# Patient Record
Sex: MALE | Race: WHITE | NOT HISPANIC OR LATINO | URBAN - METROPOLITAN AREA
[De-identification: names, ages, dates, MRNs, and addresses within clinical notes are randomized per-mention and may not be internally consistent; named-entity substitution may affect disease eponyms.]

---

## 2017-08-24 ENCOUNTER — EMERGENCY (EMERGENCY)
Facility: HOSPITAL | Age: 57
LOS: 1 days | Discharge: HOME | End: 2017-08-24
Attending: EMERGENCY MEDICINE
Payer: COMMERCIAL

## 2017-08-24 VITALS
DIASTOLIC BLOOD PRESSURE: 87 MMHG | HEIGHT: 71 IN | TEMPERATURE: 99 F | OXYGEN SATURATION: 97 % | HEART RATE: 83 BPM | RESPIRATION RATE: 21 BRPM | SYSTOLIC BLOOD PRESSURE: 194 MMHG | WEIGHT: 315 LBS

## 2017-08-24 PROBLEM — Z00.00 ENCOUNTER FOR PREVENTIVE HEALTH EXAMINATION: Status: ACTIVE | Noted: 2017-08-24

## 2017-08-24 PROCEDURE — 12002 RPR S/N/AX/GEN/TRNK2.6-7.5CM: CPT

## 2017-08-24 PROCEDURE — 90471 IMMUNIZATION ADMIN: CPT

## 2017-08-24 PROCEDURE — 90715 TDAP VACCINE 7 YRS/> IM: CPT

## 2017-08-24 PROCEDURE — 99283 EMERGENCY DEPT VISIT LOW MDM: CPT | Mod: 25

## 2017-08-24 RX ORDER — HYDRALAZINE HCL 50 MG
50 TABLET ORAL ONCE
Qty: 0 | Refills: 0 | Status: COMPLETED | OUTPATIENT
Start: 2017-08-24 | End: 2017-08-24

## 2017-08-24 RX ORDER — METOPROLOL TARTRATE 50 MG
50 TABLET ORAL ONCE
Qty: 0 | Refills: 0 | Status: COMPLETED | OUTPATIENT
Start: 2017-08-24 | End: 2017-08-24

## 2017-08-24 RX ORDER — TETANUS TOXOID, REDUCED DIPHTHERIA TOXOID AND ACELLULAR PERTUSSIS VACCINE, ADSORBED 5; 2.5; 8; 8; 2.5 [IU]/.5ML; [IU]/.5ML; UG/.5ML; UG/.5ML; UG/.5ML
0.5 SUSPENSION INTRAMUSCULAR ONCE
Qty: 0 | Refills: 0 | Status: COMPLETED | OUTPATIENT
Start: 2017-08-24 | End: 2017-08-24

## 2017-08-24 RX ADMIN — Medication 50 MILLIGRAM(S): at 22:09

## 2017-08-24 RX ADMIN — TETANUS TOXOID, REDUCED DIPHTHERIA TOXOID AND ACELLULAR PERTUSSIS VACCINE, ADSORBED 0.5 MILLILITER(S): 5; 2.5; 8; 8; 2.5 SUSPENSION INTRAMUSCULAR at 22:09

## 2017-08-24 NOTE — ED STATDOCS - OBJECTIVE STATEMENT
55 y/o M PMHx HTN, DM, hyperlipidemia presents to ED c/o laceration to abdomen while cutting something around 1930. Pt is on coumadin for a PE he had a few years ago. Last tetanus unknown. On Hydralazine, Metroprolol and Amlodipine. Pt states he has not taken his night medications yet. Denies N/V/D, fever, chills, SOB, CP, difficulty breathing, HA, numbness, tingling and abd pain.

## 2017-08-24 NOTE — ED STATDOCS - PROGRESS NOTE DETAILS
PA NOTE: Pt discussed at length with attending HPI/ROS/PE confirmed. PT seen resting computably in no acute distress in chair, PA called over to pt to close laceration and DC home as per Dr Velasquez, PT will be Dc home with ABx follow up PCP, return to the ED to have suture removed, educated about when to return to the ED if needed. PT verbalizes that he understands all instructions and results. PA NOTE: Pt discussed at length with attending HPI/ROS/PE confirmed. PT seen resting computably in no acute distress in chair, PA called over to pt to close laceration and DC home as per Dr Velasquez, PT will be Dc home  follow up PCP, return to the ED to have suture removed, educated about when to return to the ED if needed. PT verbalizes that he understands all instructions and results.

## 2017-08-24 NOTE — ED ADULT NURSE NOTE - OBJECTIVE STATEMENT
pt received in [facility]. pt is awake alert oriented following commands and speaking coherently. pt presents to the er complaining of laceration to abdomen that happened while he was doing work on patio. he states that he is on coumadin from a previous PE. pt is morbidly obese.5cm laceration to mid abdomen with minimal bleeding. denies nausea vomiting. pressure was initially high, retaken and charted in flowsheets. unknown last tetanus

## 2017-08-25 VITALS
TEMPERATURE: 99 F | RESPIRATION RATE: 20 BRPM | OXYGEN SATURATION: 98 % | SYSTOLIC BLOOD PRESSURE: 156 MMHG | DIASTOLIC BLOOD PRESSURE: 78 MMHG | HEART RATE: 80 BPM

## 2018-11-17 ENCOUNTER — INPATIENT (INPATIENT)
Facility: HOSPITAL | Age: 58
LOS: 3 days | Discharge: HOME | DRG: 683 | End: 2018-11-21
Attending: HOSPITALIST | Admitting: STUDENT IN AN ORGANIZED HEALTH CARE EDUCATION/TRAINING PROGRAM
Payer: COMMERCIAL

## 2018-11-17 VITALS — WEIGHT: 315 LBS | HEIGHT: 71 IN

## 2018-11-17 PROCEDURE — 99285 EMERGENCY DEPT VISIT HI MDM: CPT | Mod: 25

## 2018-11-18 DIAGNOSIS — E66.01 MORBID (SEVERE) OBESITY DUE TO EXCESS CALORIES: ICD-10-CM

## 2018-11-18 DIAGNOSIS — I10 ESSENTIAL (PRIMARY) HYPERTENSION: ICD-10-CM

## 2018-11-18 DIAGNOSIS — N18.3 CHRONIC KIDNEY DISEASE, STAGE 3 (MODERATE): ICD-10-CM

## 2018-11-18 DIAGNOSIS — I82.432 ACUTE EMBOLISM AND THROMBOSIS OF LEFT POPLITEAL VEIN: ICD-10-CM

## 2018-11-18 DIAGNOSIS — L03.90 CELLULITIS, UNSPECIFIED: ICD-10-CM

## 2018-11-18 DIAGNOSIS — L03.116 CELLULITIS OF LEFT LOWER LIMB: ICD-10-CM

## 2018-11-18 DIAGNOSIS — E11.22 TYPE 2 DIABETES MELLITUS WITH DIABETIC CHRONIC KIDNEY DISEASE: ICD-10-CM

## 2018-11-18 DIAGNOSIS — E78.5 HYPERLIPIDEMIA, UNSPECIFIED: ICD-10-CM

## 2018-11-18 LAB
ALBUMIN SERPL ELPH-MCNC: 3.5 G/DL — SIGNIFICANT CHANGE UP (ref 3.3–5.2)
ALP SERPL-CCNC: 72 U/L — SIGNIFICANT CHANGE UP (ref 40–120)
ALT FLD-CCNC: 17 U/L — SIGNIFICANT CHANGE UP
ANION GAP SERPL CALC-SCNC: 14 MMOL/L — SIGNIFICANT CHANGE UP (ref 5–17)
APPEARANCE UR: CLEAR — SIGNIFICANT CHANGE UP
AST SERPL-CCNC: 18 U/L — SIGNIFICANT CHANGE UP
BACTERIA # UR AUTO: ABNORMAL
BASOPHILS # BLD AUTO: 0 K/UL — SIGNIFICANT CHANGE UP (ref 0–0.2)
BASOPHILS NFR BLD AUTO: 0.1 % — SIGNIFICANT CHANGE UP (ref 0–2)
BILIRUB SERPL-MCNC: 0.7 MG/DL — SIGNIFICANT CHANGE UP (ref 0.4–2)
BILIRUB UR-MCNC: NEGATIVE — SIGNIFICANT CHANGE UP
BUN SERPL-MCNC: 64 MG/DL — HIGH (ref 8–20)
CALCIUM SERPL-MCNC: 9.1 MG/DL — SIGNIFICANT CHANGE UP (ref 8.6–10.2)
CHLORIDE SERPL-SCNC: 102 MMOL/L — SIGNIFICANT CHANGE UP (ref 98–107)
CO2 SERPL-SCNC: 23 MMOL/L — SIGNIFICANT CHANGE UP (ref 22–29)
COLOR SPEC: YELLOW — SIGNIFICANT CHANGE UP
CREAT ?TM UR-MCNC: 117 MG/DL — SIGNIFICANT CHANGE UP
CREAT SERPL-MCNC: 3.3 MG/DL — HIGH (ref 0.5–1.3)
DIFF PNL FLD: ABNORMAL
EOSINOPHIL # BLD AUTO: 0.2 K/UL — SIGNIFICANT CHANGE UP (ref 0–0.5)
EOSINOPHIL NFR BLD AUTO: 1.5 % — SIGNIFICANT CHANGE UP (ref 0–5)
EPI CELLS # UR: SIGNIFICANT CHANGE UP
GLUCOSE BLDC GLUCOMTR-MCNC: 124 MG/DL — HIGH (ref 70–99)
GLUCOSE BLDC GLUCOMTR-MCNC: 126 MG/DL — HIGH (ref 70–99)
GLUCOSE BLDC GLUCOMTR-MCNC: 135 MG/DL — HIGH (ref 70–99)
GLUCOSE BLDC GLUCOMTR-MCNC: 146 MG/DL — HIGH (ref 70–99)
GLUCOSE SERPL-MCNC: 122 MG/DL — HIGH (ref 70–115)
GLUCOSE UR QL: NEGATIVE MG/DL — SIGNIFICANT CHANGE UP
HBA1C BLD-MCNC: 5.4 % — SIGNIFICANT CHANGE UP (ref 4–5.6)
HCT VFR BLD CALC: 32 % — LOW (ref 42–52)
HGB BLD-MCNC: 10.3 G/DL — LOW (ref 14–18)
IRON SATN MFR SERPL: 14 UG/DL — LOW (ref 59–158)
IRON SATN MFR SERPL: 6 % — LOW (ref 16–55)
KETONES UR-MCNC: NEGATIVE — SIGNIFICANT CHANGE UP
LEUKOCYTE ESTERASE UR-ACNC: ABNORMAL
LYMPHOCYTES # BLD AUTO: 0.9 K/UL — LOW (ref 1–4.8)
LYMPHOCYTES # BLD AUTO: 7.1 % — LOW (ref 20–55)
MCHC RBC-ENTMCNC: 25.9 PG — LOW (ref 27–31)
MCHC RBC-ENTMCNC: 32.2 G/DL — SIGNIFICANT CHANGE UP (ref 32–36)
MCV RBC AUTO: 80.6 FL — SIGNIFICANT CHANGE UP (ref 80–94)
MONOCYTES # BLD AUTO: 0.8 K/UL — SIGNIFICANT CHANGE UP (ref 0–0.8)
MONOCYTES NFR BLD AUTO: 6.7 % — SIGNIFICANT CHANGE UP (ref 3–10)
NEUTROPHILS # BLD AUTO: 10.1 K/UL — HIGH (ref 1.8–8)
NEUTROPHILS NFR BLD AUTO: 84.3 % — HIGH (ref 37–73)
NITRITE UR-MCNC: NEGATIVE — SIGNIFICANT CHANGE UP
PH UR: 5 — SIGNIFICANT CHANGE UP (ref 5–8)
PHOSPHATE SERPL-MCNC: 3.1 MG/DL — SIGNIFICANT CHANGE UP (ref 2.4–4.7)
PLATELET # BLD AUTO: 128 K/UL — LOW (ref 150–400)
POTASSIUM SERPL-MCNC: 4.2 MMOL/L — SIGNIFICANT CHANGE UP (ref 3.5–5.3)
POTASSIUM SERPL-SCNC: 4.2 MMOL/L — SIGNIFICANT CHANGE UP (ref 3.5–5.3)
PROT ?TM UR-MCNC: 62 MG/DL — HIGH (ref 0–12)
PROT SERPL-MCNC: 6.8 G/DL — SIGNIFICANT CHANGE UP (ref 6.6–8.7)
PROT UR-MCNC: 100 MG/DL
PROT/CREAT UR-RTO: 0.5 RATIO — HIGH
RBC # BLD: 3.97 M/UL — LOW (ref 4.6–6.2)
RBC # FLD: 17.8 % — HIGH (ref 11–15.6)
RBC CASTS # UR COMP ASSIST: SIGNIFICANT CHANGE UP /HPF (ref 0–4)
SODIUM SERPL-SCNC: 139 MMOL/L — SIGNIFICANT CHANGE UP (ref 135–145)
SP GR SPEC: 1.01 — SIGNIFICANT CHANGE UP (ref 1.01–1.02)
TIBC SERPL-MCNC: 229 UG/DL — SIGNIFICANT CHANGE UP (ref 220–430)
TRANSFERRIN SERPL-MCNC: 160 MG/DL — LOW (ref 180–329)
URATE SERPL-MCNC: 11.5 MG/DL — HIGH (ref 3.4–7)
UROBILINOGEN FLD QL: NEGATIVE MG/DL — SIGNIFICANT CHANGE UP
WBC # BLD: 12 K/UL — HIGH (ref 4.8–10.8)
WBC # FLD AUTO: 12 K/UL — HIGH (ref 4.8–10.8)
WBC UR QL: SIGNIFICANT CHANGE UP

## 2018-11-18 PROCEDURE — 99223 1ST HOSP IP/OBS HIGH 75: CPT

## 2018-11-18 PROCEDURE — 93971 EXTREMITY STUDY: CPT | Mod: 26,LT

## 2018-11-18 PROCEDURE — 76775 US EXAM ABDO BACK WALL LIM: CPT | Mod: 26

## 2018-11-18 PROCEDURE — 73590 X-RAY EXAM OF LOWER LEG: CPT | Mod: 26,LT

## 2018-11-18 PROCEDURE — 12345: CPT | Mod: NC

## 2018-11-18 RX ORDER — GLUCAGON INJECTION, SOLUTION 0.5 MG/.1ML
1 INJECTION, SOLUTION SUBCUTANEOUS ONCE
Qty: 0 | Refills: 0 | Status: DISCONTINUED | OUTPATIENT
Start: 2018-11-18 | End: 2018-11-21

## 2018-11-18 RX ORDER — SACCHAROMYCES BOULARDII 250 MG
250 POWDER IN PACKET (EA) ORAL
Qty: 0 | Refills: 0 | Status: DISCONTINUED | OUTPATIENT
Start: 2018-11-18 | End: 2018-11-21

## 2018-11-18 RX ORDER — VANCOMYCIN HCL 1 G
1000 VIAL (EA) INTRAVENOUS ONCE
Qty: 0 | Refills: 0 | Status: COMPLETED | OUTPATIENT
Start: 2018-11-18 | End: 2018-11-18

## 2018-11-18 RX ORDER — HYDRALAZINE HCL 50 MG
50 TABLET ORAL THREE TIMES A DAY
Qty: 0 | Refills: 0 | Status: DISCONTINUED | OUTPATIENT
Start: 2018-11-18 | End: 2018-11-21

## 2018-11-18 RX ORDER — SODIUM CHLORIDE 9 MG/ML
1000 INJECTION, SOLUTION INTRAVENOUS
Qty: 0 | Refills: 0 | Status: DISCONTINUED | OUTPATIENT
Start: 2018-11-18 | End: 2018-11-21

## 2018-11-18 RX ORDER — VANCOMYCIN HCL 1 G
1000 VIAL (EA) INTRAVENOUS EVERY 24 HOURS
Qty: 0 | Refills: 0 | Status: DISCONTINUED | OUTPATIENT
Start: 2018-11-18 | End: 2018-11-19

## 2018-11-18 RX ORDER — LABETALOL HCL 100 MG
300 TABLET ORAL
Qty: 0 | Refills: 0 | Status: DISCONTINUED | OUTPATIENT
Start: 2018-11-18 | End: 2018-11-21

## 2018-11-18 RX ORDER — ONDANSETRON 8 MG/1
4 TABLET, FILM COATED ORAL EVERY 6 HOURS
Qty: 0 | Refills: 0 | Status: DISCONTINUED | OUTPATIENT
Start: 2018-11-18 | End: 2018-11-21

## 2018-11-18 RX ORDER — SODIUM CHLORIDE 9 MG/ML
1000 INJECTION INTRAMUSCULAR; INTRAVENOUS; SUBCUTANEOUS ONCE
Qty: 0 | Refills: 0 | Status: DISCONTINUED | OUTPATIENT
Start: 2018-11-18 | End: 2018-11-18

## 2018-11-18 RX ORDER — DEXTROSE 50 % IN WATER 50 %
15 SYRINGE (ML) INTRAVENOUS ONCE
Qty: 0 | Refills: 0 | Status: DISCONTINUED | OUTPATIENT
Start: 2018-11-18 | End: 2018-11-21

## 2018-11-18 RX ORDER — DEXTROSE 50 % IN WATER 50 %
12.5 SYRINGE (ML) INTRAVENOUS ONCE
Qty: 0 | Refills: 0 | Status: DISCONTINUED | OUTPATIENT
Start: 2018-11-18 | End: 2018-11-21

## 2018-11-18 RX ORDER — INSULIN LISPRO 100/ML
VIAL (ML) SUBCUTANEOUS
Qty: 0 | Refills: 0 | Status: DISCONTINUED | OUTPATIENT
Start: 2018-11-18 | End: 2018-11-21

## 2018-11-18 RX ORDER — TAMSULOSIN HYDROCHLORIDE 0.4 MG/1
0.4 CAPSULE ORAL AT BEDTIME
Qty: 0 | Refills: 0 | Status: DISCONTINUED | OUTPATIENT
Start: 2018-11-18 | End: 2018-11-21

## 2018-11-18 RX ORDER — ATORVASTATIN CALCIUM 80 MG/1
20 TABLET, FILM COATED ORAL AT BEDTIME
Qty: 0 | Refills: 0 | Status: DISCONTINUED | OUTPATIENT
Start: 2018-11-18 | End: 2018-11-21

## 2018-11-18 RX ORDER — RIVAROXABAN 15 MG-20MG
15 KIT ORAL EVERY 24 HOURS
Qty: 0 | Refills: 0 | Status: DISCONTINUED | OUTPATIENT
Start: 2018-11-18 | End: 2018-11-21

## 2018-11-18 RX ORDER — ACETAMINOPHEN 500 MG
650 TABLET ORAL EVERY 6 HOURS
Qty: 0 | Refills: 0 | Status: DISCONTINUED | OUTPATIENT
Start: 2018-11-18 | End: 2018-11-21

## 2018-11-18 RX ORDER — SODIUM CHLORIDE 9 MG/ML
1000 INJECTION INTRAMUSCULAR; INTRAVENOUS; SUBCUTANEOUS
Qty: 0 | Refills: 0 | Status: DISCONTINUED | OUTPATIENT
Start: 2018-11-18 | End: 2018-11-21

## 2018-11-18 RX ORDER — SODIUM CHLORIDE 9 MG/ML
3 INJECTION INTRAMUSCULAR; INTRAVENOUS; SUBCUTANEOUS EVERY 8 HOURS
Qty: 0 | Refills: 0 | Status: DISCONTINUED | OUTPATIENT
Start: 2018-11-18 | End: 2018-11-21

## 2018-11-18 RX ORDER — AMLODIPINE BESYLATE 2.5 MG/1
10 TABLET ORAL DAILY
Qty: 0 | Refills: 0 | Status: DISCONTINUED | OUTPATIENT
Start: 2018-11-18 | End: 2018-11-21

## 2018-11-18 RX ADMIN — Medication 50 MILLIGRAM(S): at 13:49

## 2018-11-18 RX ADMIN — ATORVASTATIN CALCIUM 20 MILLIGRAM(S): 80 TABLET, FILM COATED ORAL at 21:53

## 2018-11-18 RX ADMIN — Medication 300 MILLIGRAM(S): at 07:32

## 2018-11-18 RX ADMIN — SODIUM CHLORIDE 3 MILLILITER(S): 9 INJECTION INTRAMUSCULAR; INTRAVENOUS; SUBCUTANEOUS at 07:38

## 2018-11-18 RX ADMIN — TAMSULOSIN HYDROCHLORIDE 0.4 MILLIGRAM(S): 0.4 CAPSULE ORAL at 21:52

## 2018-11-18 RX ADMIN — Medication 300 MILLIGRAM(S): at 17:29

## 2018-11-18 RX ADMIN — Medication 250 MILLIGRAM(S): at 04:25

## 2018-11-18 RX ADMIN — SODIUM CHLORIDE 3 MILLILITER(S): 9 INJECTION INTRAMUSCULAR; INTRAVENOUS; SUBCUTANEOUS at 13:47

## 2018-11-18 RX ADMIN — SODIUM CHLORIDE 3 MILLILITER(S): 9 INJECTION INTRAMUSCULAR; INTRAVENOUS; SUBCUTANEOUS at 21:53

## 2018-11-18 RX ADMIN — RIVAROXABAN 15 MILLIGRAM(S): KIT at 17:29

## 2018-11-18 RX ADMIN — AMLODIPINE BESYLATE 10 MILLIGRAM(S): 2.5 TABLET ORAL at 07:32

## 2018-11-18 RX ADMIN — Medication 250 MILLIGRAM(S): at 07:32

## 2018-11-18 RX ADMIN — SODIUM CHLORIDE 100 MILLILITER(S): 9 INJECTION INTRAMUSCULAR; INTRAVENOUS; SUBCUTANEOUS at 13:47

## 2018-11-18 RX ADMIN — Medication 50 MILLIGRAM(S): at 21:53

## 2018-11-18 RX ADMIN — Medication 250 MILLIGRAM(S): at 17:29

## 2018-11-18 RX ADMIN — Medication 50 MILLIGRAM(S): at 07:32

## 2018-11-18 RX ADMIN — Medication 1 TABLET(S): at 13:48

## 2018-11-18 NOTE — ED PROVIDER NOTE - CARE PLAN
Principal Discharge DX:	Cellulitis Principal Discharge DX:	Cellulitis  Secondary Diagnosis:	Acute deep vein thrombosis (DVT) of popliteal vein of left lower extremity  Secondary Diagnosis:	GODWIN (acute kidney injury)

## 2018-11-18 NOTE — ED ADULT NURSE REASSESSMENT NOTE - NS ED NURSE REASSESS COMMENT FT1
Pt A&Ox4 c/o LLE pain, swelling and redness at this time. Pt resting comfortably, VSS, no signs of distress at this time, awaiting bed, report given to HR RN VL, moved to CDU 3, safety maintained, call bell in reach. Pt A&Ox4 c/o LLE pain, swelling and redness at this time. Pt resting comfortably, VSS, no signs of distress at this time, awaiting bed, safety maintained, call bell in reach.

## 2018-11-18 NOTE — ED ADULT NURSE NOTE - CHPI ED NUR SYMPTOMS NEG
no vomiting/no weakness/no chills/no decreased eating/drinking/no dizziness/no fever/no nausea/no tingling

## 2018-11-18 NOTE — ED ADULT NURSE REASSESSMENT NOTE - COMFORT CARE
side rails up/darkened lights/plan of care explained/repositioned/wait time explained/warm blanket provided

## 2018-11-18 NOTE — ED PROVIDER NOTE - OBJECTIVE STATEMENT
57 yo M Pt, PmHx: HTN, HLD, obesity, DM, DVT, PE, PVD on Xarelto 20 mg, presents to ED complaining of LLE swelling x 2 days. PT states he has LLE swelling, redness, and tenderness. Pt admits to associated chills. Pt denies fever, chills, N/V/D, recent trauma, SOB, chest pain, and any other acute symptoms at this time.   PMD: 59 yo M Pt, PmHx: HTN, HLD, obesity, DM, DVT, PE, PVD on Xarelto 20 mg, presents to ED complaining of LLE swelling x 2 days. PT states he has LLE swelling, redness, and tenderness. Pt admits to associated chills. Pt denies fever, chills, N/V/D, recent trauma, SOB, chest pain, and any other acute symptoms at this time.   PMD: Ernie

## 2018-11-18 NOTE — ED ADULT NURSE REASSESSMENT NOTE - NSIMPLEMENTINTERV_GEN_ALL_ED
Implemented All Fall Risk Interventions:  [place] to call system. Call bell, personal items and telephone within reach. Instruct patient to call for assistance. Room bathroom lighting operational. Non-slip footwear when patient is off stretcher. Physically safe environment: no spills, clutter or unnecessary equipment. Stretcher in lowest position, wheels locked, appropriate side rails in place. Provide visual cue, wrist band, yellow gown, etc. Monitor gait and stability. Monitor for mental status changes and reorient to person, place, and time. Review medications for side effects contributing to fall risk. Reinforce activity limits and safety measures with patient and family.

## 2018-11-18 NOTE — ED PROVIDER NOTE - SECONDARY DIAGNOSIS.
Acute deep vein thrombosis (DVT) of popliteal vein of left lower extremity GODWIN (acute kidney injury)

## 2018-11-18 NOTE — CHART NOTE - NSCHARTNOTEFT_GEN_A_CORE
FRENCH DEY    [id number]    58y      Male    INTERVAL HPI/OVERNIGHT EVENTS:    REVIEW OF SYSTEMS:    CONSTITUTIONAL: No fever, weight loss, or fatigue  RESPIRATORY: No cough, wheezing, hemoptysis; No shortness of breath  CARDIOVASCULAR: No chest pain, palpitations  GASTROINTESTINAL: No abdominal or epigastric pain. No nausea, vomiting  NEUROLOGICAL: No headaches, memory loss, loss of strength.  MISCELLANEOUS:      Vital Signs Last 24 Hrs  T(C): 37.1 (18 Nov 2018 08:04), Max: 37.2 (17 Nov 2018 23:44)  T(F): 98.8 (18 Nov 2018 08:04), Max: 98.9 (17 Nov 2018 23:44)  HR: 75 (18 Nov 2018 08:04) (66 - 76)  BP: 133/66 (18 Nov 2018 08:04) (133/66 - 171/75)  BP(mean): 116 (18 Nov 2018 04:15) (116 - 116)  RR: 18 (18 Nov 2018 08:04) (16 - 20)  SpO2: 98% (18 Nov 2018 05:59) (97% - 98%)    PHYSICAL EXAM:    GENERAL: NAD, well-groomed  HEENT: PERRL, +EOMI  NECK: soft, Supple, No JVD,   CHEST/LUNG: Clear to auscultation bilaterally; No wheezing  HEART: S1S2+, Regular rate and rhythm; No murmurs, rubs, or gallops  ABDOMEN: Soft, Nontender, Nondistended; Bowel sounds present  EXTREMITIES:  2+ Peripheral Pulses, No clubbing, cyanosis, or edema  SKIN: No rashes or lesions  NEURO: AAOX3, no focal deficits, no motor r sensory loss  PSYCH: normal mood      LABS:                        10.3   12.0  )-----------( 128      ( 18 Nov 2018 02:13 )             32.0     11-18    139  |  102  |  64.0<H>  ----------------------------<  122<H>  4.2   |  23.0  |  3.30<H>    Ca    9.1      18 Nov 2018 02:13    TPro  6.8  /  Alb  3.5  /  TBili  0.7  /  DBili  x   /  AST  18  /  ALT  17  /  AlkPhos  72  11-18            MEDICATIONS  (STANDING):  amLODIPine   Tablet 10 milliGRAM(s) Oral daily  atorvastatin 20 milliGRAM(s) Oral at bedtime  dextrose 5%. 1000 milliLiter(s) (50 mL/Hr) IV Continuous <Continuous>  dextrose 50% Injectable 12.5 Gram(s) IV Push once  hydrALAZINE 50 milliGRAM(s) Oral three times a day  insulin lispro (HumaLOG) corrective regimen sliding scale   SubCutaneous Before meals and at bedtime  labetalol 300 milliGRAM(s) Oral two times a day  levoFLOXacin IVPB 500 milliGRAM(s) IV Intermittent every 48 hours  multivitamin 1 Tablet(s) Oral daily  rivaroxaban 15 milliGRAM(s) Oral every 24 hours  saccharomyces boulardii 250 milliGRAM(s) Oral two times a day  sodium chloride 0.9% lock flush 3 milliLiter(s) IV Push every 8 hours  sodium chloride 0.9%. 1000 milliLiter(s) (100 mL/Hr) IV Continuous <Continuous>  tamsulosin 0.4 milliGRAM(s) Oral at bedtime  vancomycin  IVPB 1000 milliGRAM(s) IV Intermittent every 24 hours    MEDICATIONS  (PRN):  acetaminophen   Tablet .. 650 milliGRAM(s) Oral every 6 hours PRN Temp greater or equal to 38C (100.4F), Mild Pain (1 - 3)  dextrose 40% Gel 15 Gram(s) Oral once PRN Blood Glucose LESS THAN 70 milliGRAM(s)/deciliter  glucagon  Injectable 1 milliGRAM(s) IntraMuscular once PRN Glucose LESS THAN 70 milligrams/deciliter  ondansetron Injectable 4 milliGRAM(s) IV Push every 6 hours PRN Nausea      RADIOLOGY & ADDITIONAL TESTS: FRENCH DEY    [id number]    58y      Male    INTERVAL HPI/OVERNIGHT EVENTS:    patient being seen for cellulitis, dvt and acute on chronic renal failure. Patient seen at bedside and states pain and swelling in leg is improved.     of note patient states he was recently changed from coumadin to xarelto.       REVIEW OF SYSTEMS:    CONSTITUTIONAL: No fever, weight loss, or fatigue  RESPIRATORY: No cough, wheezing, hemoptysis; No shortness of breath  CARDIOVASCULAR: No chest pain, palpitations  GASTROINTESTINAL: No abdominal or epigastric pain. No nausea, vomiting  NEUROLOGICAL: No headaches, memory loss, loss of strength.  MISCELLANEOUS:      Vital Signs Last 24 Hrs  T(C): 37.1 (18 Nov 2018 08:04), Max: 37.2 (17 Nov 2018 23:44)  T(F): 98.8 (18 Nov 2018 08:04), Max: 98.9 (17 Nov 2018 23:44)  HR: 75 (18 Nov 2018 08:04) (66 - 76)  BP: 133/66 (18 Nov 2018 08:04) (133/66 - 171/75)  BP(mean): 116 (18 Nov 2018 04:15) (116 - 116)  RR: 18 (18 Nov 2018 08:04) (16 - 20)  SpO2: 98% (18 Nov 2018 05:59) (97% - 98%)    PHYSICAL EXAM:    GENERAL: NAD, obese   HEENT: PERRL, +EOMI  NECK: soft, Supple, No JVD,   CHEST/LUNG: Clear to auscultation bilaterally; No wheezing  HEART: S1S2+, Regular rate and rhythm; No murmurs  ABDOMEN: Soft, Nontender,   EXTREMITIES:  LLE swelling with chronic skin changes, left> right   NEURO: AAOX3,  PSYCH: normal mood      LABS:                        10.3   12.0  )-----------( 128      ( 18 Nov 2018 02:13 )             32.0     11-18    139  |  102  |  64.0<H>  ----------------------------<  122<H>  4.2   |  23.0  |  3.30<H>    Ca    9.1      18 Nov 2018 02:13    TPro  6.8  /  Alb  3.5  /  TBili  0.7  /  DBili  x   /  AST  18  /  ALT  17  /  AlkPhos  72  11-18        MEDICATIONS  (STANDING):  amLODIPine   Tablet 10 milliGRAM(s) Oral daily  atorvastatin 20 milliGRAM(s) Oral at bedtime  dextrose 5%. 1000 milliLiter(s) (50 mL/Hr) IV Continuous <Continuous>  dextrose 50% Injectable 12.5 Gram(s) IV Push once  hydrALAZINE 50 milliGRAM(s) Oral three times a day  insulin lispro (HumaLOG) corrective regimen sliding scale   SubCutaneous Before meals and at bedtime  labetalol 300 milliGRAM(s) Oral two times a day  levoFLOXacin IVPB 500 milliGRAM(s) IV Intermittent every 48 hours  multivitamin 1 Tablet(s) Oral daily  rivaroxaban 15 milliGRAM(s) Oral every 24 hours  saccharomyces boulardii 250 milliGRAM(s) Oral two times a day  sodium chloride 0.9% lock flush 3 milliLiter(s) IV Push every 8 hours  sodium chloride 0.9%. 1000 milliLiter(s) (100 mL/Hr) IV Continuous <Continuous>  tamsulosin 0.4 milliGRAM(s) Oral at bedtime  vancomycin  IVPB 1000 milliGRAM(s) IV Intermittent every 24 hours    MEDICATIONS  (PRN):  acetaminophen   Tablet .. 650 milliGRAM(s) Oral every 6 hours PRN Temp greater or equal to 38C (100.4F), Mild Pain (1 - 3)  dextrose 40% Gel 15 Gram(s) Oral once PRN Blood Glucose LESS THAN 70 milliGRAM(s)/deciliter  glucagon  Injectable 1 milliGRAM(s) IntraMuscular once PRN Glucose LESS THAN 70 milligrams/deciliter  ondansetron Injectable 4 milliGRAM(s) IV Push every 6 hours PRN Nausea      RADIOLOGY & ADDITIONAL TESTS:    us renal - IMPRESSION:     Extremely limited examination due to patient body habitus.    Right kidney grossly unremarkable.    Left kidney not visualized.      a/p    57 y/o male with ckd admitted for LLE cellulitis and acute on chronic renal failure      Problem/Plan - 1:  ·  Problem: Cellulitis of left lower extremity.  Plan: Likely secondary to poor skin integrity with underlying venous stasis dermatitis and DM-2. No sepsis criteria. Cont. renal dose Levaquin, Vancomycin, monitor temp curve, WBC. Add probiotics, Elevation, counseled on stocking use in future.      Problem/Plan - 2:  ·  Problem: R/O GODWIN (acute kidney injury).  Plan: Meets criteria for GODWIN based on known Cr. of 2.4 in 10/18. Concern for obstructive uropathy with frequent urination possible from overflow incontinence. Likely has underlying hypertensive/DM nephropathy. Has history of nephrotic range proteinuria too as per patient. Will hold ACE/ARB, Metformin, renally dose meds,   --> renal us as above  --> nephro consult appreciated     Problem/Plan - 3:  ·  Problem: Acute deep vein thrombosis (DVT) of popliteal vein of left lower extremity.  known hx of DVT.   -->  Xarelto, will adjust for renal dosing based on low GFR. Denies any bleeding, falls.      Problem/Plan - 4:  ·  Problem: CKD (chronic kidney disease) stage 3, GFR 30-59 ml/min.  renal consult appreciated  --> bmp in am      Problem/Plan - 5:  ·  Problem: Type 2 diabetes mellitus with stage 4 chronic kidney disease, without long-term current use of insulin.  Plan: Hold metformin, start on HISS, Accu-Cheks AC/HS, ADA diet.      Problem/Plan - 6:  Problem: Hypertension, unspecified type. Plan: DASH diet, cont. o/p regimen except for ARB with GODWIN.     Problem/Plan - 7:  ·  Problem: Hyperlipidemia, unspecified hyperlipidemia type.  Plan: statin.      Problem/Plan - 8:  ·  Problem: Class 3 severe obesity with serious comorbidity in adult, unspecified BMI, unspecified obesity type.  Plan: counseled on diet/exercise.     Problem 9 - urinary spasms --> check ua and urine culture

## 2018-11-18 NOTE — CONSULT NOTE ADULT - ASSESSMENT
GODWIN on CKD cr baseline per pt approx 2.4 I will obtain office records crescencio  Progression of underlying dz HTN/ DM vs some degree of GODWIN   Will give trial of IVF 24hrs   Suspect will ultimately need diuretics  Will check renal sono r/o obstructive etiology  Avoid NSAID nephrotoxic agents   Will check 24 hr urine study  cellulitis L LE  Renally dose abx  HTN BP controlled on current regime  meds reviewed    Will follow

## 2018-11-18 NOTE — ED PROVIDER NOTE - MEDICAL DECISION MAKING DETAILS
Lower extremity swelling and erythema, likely cellulitis, will obtain labs, initiate IV abx, iv fluids, US and consider admission of extensive cellulitis

## 2018-11-18 NOTE — ED PROVIDER NOTE - MUSCULOSKELETAL, MLM
Spine appears normal, range of motion is not limited, + TTP of LLE calf and knee, DP intact, FROM of LLE, no ligamentous laxity

## 2018-11-18 NOTE — H&P ADULT - PROBLEM SELECTOR PLAN 1
Likely secondary to poor skin integrity with underlying venous stasis dermatitis and DM-2. No sepsis criteria. Cont. renal dose Levaquin, Vancomycin, monitor temp curve, WBC. Add probiotics, Elevation, counseled on stocking use in future

## 2018-11-18 NOTE — H&P ADULT - PROBLEM SELECTOR PLAN 2
Meets criteria for GODWIN based on known Cr. of 2.4 in 10/18. Concern for obstructive uropathy with frequent urination possible from overflow incontinence. Likely has underlying hypertensive/DM nephropathy. Has history of nephrotic range proteinuria too as per patient. Will hold ACE/ARB, Metformin, renally dose meds, check renal U/S. Nephro eval called.

## 2018-11-18 NOTE — ED PROVIDER NOTE - ATTENDING CONTRIBUTION TO CARE
58-year-old maleMorbid obesityHistory of DVTDiabetesPresents to emergency room withComplaints ofGradual increasing erythema swelling of left lower extremity patient is on 0 Ernestine DVT plan to check labs ultrasound IV antibiotics and reevaluate

## 2018-11-18 NOTE — H&P ADULT - PROBLEM SELECTOR PLAN 3
US as above, known hx of DVT. No Resp. symptoms, not hypotensive of hypoxic to suggest PE. On Xarelto, will adjust for renal dosing based on low GFR. Denies any bleeding, falls

## 2018-11-18 NOTE — ED ADULT NURSE NOTE - NSIMPLEMENTINTERV_GEN_ALL_ED
Implemented All Fall Risk Interventions:  [place] to call system. Call bell, personal items and telephone within reach. Instruct patient to call for assistance. Room bathroom lighting operational. Non-slip footwear when patient is off stretcher. Physically safe environment: no spills, clutter or unnecessary equipment. Stretcher in lowest position, wheels locked, appropriate side rails in place. Provide visual cue, wrist band, yellow gown, etc. Monitor gait and stability. Monitor for mental status changes and reorient to person, place, and time. Review medications for side effects contributing to fall risk. Reinforce activity limits and safety measures with patient and family.

## 2018-11-18 NOTE — H&P ADULT - ASSESSMENT
59 y/o male with LLE cellulitis, Chronic DVT, GODWIN on CKD-3, HTN, HLD, DM-2, Morbid Obesity, NOBLE on CPAP

## 2018-11-18 NOTE — ED PROVIDER NOTE - SKIN, MLM
Skin normal color for race, warm, dry and intact. + rash consistent with PVD b.l LE, LLE:  distal leg circumferential edema and erythema, extending superior to knee, upper thigh and pannus. + abrasion on LLE 3rd toe

## 2018-11-18 NOTE — H&P ADULT - HISTORY OF PRESENT ILLNESS
57 y/o male with history of morbid obesity, LE DVT in s/p IVC filter on Xarelto, CKD-3 with baseline Cr. of 2.4 as of 10/18, HTN, DM-2 last A1c 6.8, HLD, NOBLE on CPAP presents with 3 days of increasing LLE pain, swelling, and redness. He feels he scratched a few days ago and has not been wearing his stockings causing it to get infected. He denies any other trauma. He admits to subjective fevers, and chills. He was going to see his PMD today, however he decided to come to the ED for further eval. In the ED he was noted to have LLE cellulitis on top of venous stasis dermatitis as well as GODWIN on CKD. He denies CP, SOB, N/V. He does admit to frequent urination, but no dysuria or flank pain.

## 2018-11-18 NOTE — CONSULT NOTE ADULT - SUBJECTIVE AND OBJECTIVE BOX
HPI:  57 y/o male with history of morbid obesity, LE DVT in s/p IVC filter on Xarelto, CKD-3 with baseline Cr. of 2.4 as of 10/18, HTN, DM-2 last A1c 6.8, HLD, NOBLE on CPAP presents with 3 days of increasing LLE pain, swelling, and redness. He feels he scratched a few days ago and has not been wearing his stockings causing it to get infected. He denies any other trauma. He admits to subjective fevers, and chills. He was going to see his PMD today, however he decided to come to the ED for further eval. In the ED he was noted to have LLE cellulitis on top of venous stasis dermatitis as well as GODWIN on CKD. He tells me cr 2.4 at baseline he follows w Dr Reyez in our office I will obtain records crescencio when office is open. He denies CP, SOB, N/V. He does admit to frequent urination, but no dysuria or flank pain.     PAST MEDICAL & SURGICAL HISTORY:  CKD (chronic kidney disease) stage 3, GFR 30-59 ml/min  Obesity  PVD (peripheral vascular disease)  DVT (deep venous thrombosis)  Pulmonary embolism  DM (diabetes mellitus)  HLD (hyperlipidemia)  HTN (hypertension)  No significant past surgical history      FAMILY HISTORY:  Family history of lung cancer (Mother)  [place]    Social History:Non smoker    MEDICATIONS  (STANDING):  amLODIPine   Tablet 10 milliGRAM(s) Oral daily  atorvastatin 20 milliGRAM(s) Oral at bedtime  dextrose 5%. 1000 milliLiter(s) (50 mL/Hr) IV Continuous <Continuous>  dextrose 50% Injectable 12.5 Gram(s) IV Push once  hydrALAZINE 50 milliGRAM(s) Oral three times a day  insulin lispro (HumaLOG) corrective regimen sliding scale   SubCutaneous Before meals and at bedtime  labetalol 300 milliGRAM(s) Oral two times a day  levoFLOXacin IVPB 500 milliGRAM(s) IV Intermittent every 48 hours  multivitamin 1 Tablet(s) Oral daily  rivaroxaban 15 milliGRAM(s) Oral every 24 hours  saccharomyces boulardii 250 milliGRAM(s) Oral two times a day  sodium chloride 0.9% lock flush 3 milliLiter(s) IV Push every 8 hours  vancomycin  IVPB 1000 milliGRAM(s) IV Intermittent every 24 hours    MEDICATIONS  (PRN):  acetaminophen   Tablet .. 650 milliGRAM(s) Oral every 6 hours PRN Temp greater or equal to 38C (100.4F), Mild Pain (1 - 3)  dextrose 40% Gel 15 Gram(s) Oral once PRN Blood Glucose LESS THAN 70 milliGRAM(s)/deciliter  glucagon  Injectable 1 milliGRAM(s) IntraMuscular once PRN Glucose LESS THAN 70 milligrams/deciliter  ondansetron Injectable 4 milliGRAM(s) IV Push every 6 hours PRN Nausea   Meds reviewed    Allergies    penicillin (Fever)        Vital Signs Last 24 Hrs  T(C): 37.1 (18 Nov 2018 08:04), Max: 37.2 (17 Nov 2018 23:44)  T(F): 98.8 (18 Nov 2018 08:04), Max: 98.9 (17 Nov 2018 23:44)  HR: 75 (18 Nov 2018 08:04) (66 - 76)  BP: 133/66 (18 Nov 2018 08:04) (133/66 - 171/75)  BP(mean): 116 (18 Nov 2018 04:15) (116 - 116)  RR: 18 (18 Nov 2018 08:04) (16 - 20)  SpO2: 98% (18 Nov 2018 05:59) (97% - 98%)  Daily Height in cm: 180.34 (17 Nov 2018 23:43)    Daily     PHYSICAL EXAM:    GENERAL: appears chronically ill, morbid obesity  HEAD:  NCAT  EYES: EOMI  NECK: Supple, neck  veins full  NERVOUS SYSTEM:  Alert & Oriented X3  CHEST/LUNG: Clear to percussion bilaterally  HEART: Regular rate and rhythm; No murmurs  ABDOMEN: Soft, Nontender, Nondistended; Bowel sounds present  EXTREMITIES:  2+ edema B/L LE erythematous L>>R      LABS:                        10.3   12.0  )-----------( 128      ( 18 Nov 2018 02:13 )             32.0     11-18    139  |  102  |  64.0<H>  ----------------------------<  122<H>  4.2   |  23.0  |  3.30<H>    Ca    9.1      18 Nov 2018 02:13    TPro  6.8  /  Alb  3.5  /  TBili  0.7  /  DBili  x   /  AST  18  /  ALT  17  /  AlkPhos  72  11-18                RADIOLOGY & ADDITIONAL TESTS:

## 2018-11-18 NOTE — ED PROVIDER NOTE - PMH
DM (diabetes mellitus)    DVT (deep venous thrombosis)    HLD (hyperlipidemia)    HTN (hypertension)    Obesity    Pulmonary embolism    PVD (peripheral vascular disease)

## 2018-11-18 NOTE — ED ADULT NURSE NOTE - OBJECTIVE STATEMENT
Pt A&Ox4 c/o LLE pain redness and swelling at this time. Pt resting comfortably, VSS, no signs of distress at this time, safety maintained, call bell in reach.

## 2018-11-18 NOTE — H&P ADULT - EXTREMITIES COMMENTS
B/L LE venous stasis dermatitis, Left leg markedly larger than right, diffuse erythema and warmth of calf extending up to posterior thigh, no fluctuance, no crepitus. Pulses +1 B/L

## 2018-11-18 NOTE — PATIENT PROFILE ADULT - NSASFUNCLEVELADLTOILET_GEN_A_NUR
Pt was a NO SHOW for 06/12/18 appt. Called pt but mailbox was full. Unable to leave VM. 0 = independent

## 2018-11-18 NOTE — H&P ADULT - PMH
CKD (chronic kidney disease) stage 3, GFR 30-59 ml/min    DM (diabetes mellitus)    DVT (deep venous thrombosis)    HLD (hyperlipidemia)    HTN (hypertension)    Obesity    Pulmonary embolism    PVD (peripheral vascular disease)

## 2018-11-19 LAB
ANION GAP SERPL CALC-SCNC: 12 MMOL/L — SIGNIFICANT CHANGE UP (ref 5–17)
BASOPHILS # BLD AUTO: 0 K/UL — SIGNIFICANT CHANGE UP (ref 0–0.2)
BASOPHILS NFR BLD AUTO: 0.1 % — SIGNIFICANT CHANGE UP (ref 0–2)
BUN SERPL-MCNC: 63 MG/DL — HIGH (ref 8–20)
CALCIUM SERPL-MCNC: 9.1 MG/DL — SIGNIFICANT CHANGE UP (ref 8.6–10.2)
CHLORIDE SERPL-SCNC: 104 MMOL/L — SIGNIFICANT CHANGE UP (ref 98–107)
CO2 SERPL-SCNC: 24 MMOL/L — SIGNIFICANT CHANGE UP (ref 22–29)
COLLECT DURATION TIME UR: 24 HR — SIGNIFICANT CHANGE UP
COLLECT DURATION TIME UR: 24 HR — SIGNIFICANT CHANGE UP
CREAT SERPL-MCNC: 3.02 MG/DL — HIGH (ref 0.5–1.3)
CULTURE RESULTS: NO GROWTH — SIGNIFICANT CHANGE UP
EOSINOPHIL # BLD AUTO: 0.2 K/UL — SIGNIFICANT CHANGE UP (ref 0–0.5)
EOSINOPHIL NFR BLD AUTO: 1.8 % — SIGNIFICANT CHANGE UP (ref 0–5)
GLUCOSE BLDC GLUCOMTR-MCNC: 116 MG/DL — HIGH (ref 70–99)
GLUCOSE BLDC GLUCOMTR-MCNC: 125 MG/DL — HIGH (ref 70–99)
GLUCOSE BLDC GLUCOMTR-MCNC: 146 MG/DL — HIGH (ref 70–99)
GLUCOSE BLDC GLUCOMTR-MCNC: 151 MG/DL — HIGH (ref 70–99)
GLUCOSE SERPL-MCNC: 147 MG/DL — HIGH (ref 70–115)
HBA1C BLD-MCNC: 5.6 % — SIGNIFICANT CHANGE UP (ref 4–5.6)
HCT VFR BLD CALC: 30.7 % — LOW (ref 42–52)
HGB BLD-MCNC: 9.7 G/DL — LOW (ref 14–18)
LYMPHOCYTES # BLD AUTO: 0.9 K/UL — LOW (ref 1–4.8)
LYMPHOCYTES # BLD AUTO: 7.9 % — LOW (ref 20–55)
MAGNESIUM SERPL-MCNC: 2.2 MG/DL — SIGNIFICANT CHANGE UP (ref 1.6–2.6)
MCHC RBC-ENTMCNC: 25.6 PG — LOW (ref 27–31)
MCHC RBC-ENTMCNC: 31.6 G/DL — LOW (ref 32–36)
MCV RBC AUTO: 81 FL — SIGNIFICANT CHANGE UP (ref 80–94)
MONOCYTES # BLD AUTO: 1 K/UL — HIGH (ref 0–0.8)
MONOCYTES NFR BLD AUTO: 8.9 % — SIGNIFICANT CHANGE UP (ref 3–10)
NEUTROPHILS # BLD AUTO: 8.9 K/UL — HIGH (ref 1.8–8)
NEUTROPHILS NFR BLD AUTO: 80.5 % — HIGH (ref 37–73)
PHOSPHATE SERPL-MCNC: 3.9 MG/DL — SIGNIFICANT CHANGE UP (ref 2.4–4.7)
PLATELET # BLD AUTO: 121 K/UL — LOW (ref 150–400)
POTASSIUM SERPL-MCNC: 4.1 MMOL/L — SIGNIFICANT CHANGE UP (ref 3.5–5.3)
POTASSIUM SERPL-SCNC: 4.1 MMOL/L — SIGNIFICANT CHANGE UP (ref 3.5–5.3)
PROT 24H UR-MRATE: 1200 MG/24HR — HIGH (ref 50–100)
RBC # BLD: 3.79 M/UL — LOW (ref 4.6–6.2)
RBC # FLD: 17.7 % — HIGH (ref 11–15.6)
SODIUM SERPL-SCNC: 140 MMOL/L — SIGNIFICANT CHANGE UP (ref 135–145)
SPECIMEN SOURCE: SIGNIFICANT CHANGE UP
TOTAL VOLUME - 24 HOUR: 2000 ML — SIGNIFICANT CHANGE UP
TOTAL VOLUME - 24 HOUR: 2000 ML — SIGNIFICANT CHANGE UP
URINE CREATININE CALCULATION: 2.2 G/24 HR — HIGH (ref 1–2)
URINE CREATININE CALCULATION: 2.2 G/24 HR — HIGH (ref 1–2)
WBC # BLD: 11 K/UL — HIGH (ref 4.8–10.8)
WBC # FLD AUTO: 11 K/UL — HIGH (ref 4.8–10.8)

## 2018-11-19 PROCEDURE — 99232 SBSQ HOSP IP/OBS MODERATE 35: CPT

## 2018-11-19 RX ORDER — LANOLIN ALCOHOL/MO/W.PET/CERES
3 CREAM (GRAM) TOPICAL ONCE
Qty: 0 | Refills: 0 | Status: DISCONTINUED | OUTPATIENT
Start: 2018-11-19 | End: 2018-11-21

## 2018-11-19 RX ORDER — FERROUS SULFATE 325(65) MG
325 TABLET ORAL THREE TIMES A DAY
Qty: 0 | Refills: 0 | Status: DISCONTINUED | OUTPATIENT
Start: 2018-11-19 | End: 2018-11-21

## 2018-11-19 RX ADMIN — SODIUM CHLORIDE 3 MILLILITER(S): 9 INJECTION INTRAMUSCULAR; INTRAVENOUS; SUBCUTANEOUS at 17:00

## 2018-11-19 RX ADMIN — Medication 250 MILLIGRAM(S): at 04:48

## 2018-11-19 RX ADMIN — Medication 600 MILLIGRAM(S): at 12:26

## 2018-11-19 RX ADMIN — SODIUM CHLORIDE 3 MILLILITER(S): 9 INJECTION INTRAMUSCULAR; INTRAVENOUS; SUBCUTANEOUS at 06:58

## 2018-11-19 RX ADMIN — SODIUM CHLORIDE 3 MILLILITER(S): 9 INJECTION INTRAMUSCULAR; INTRAVENOUS; SUBCUTANEOUS at 22:19

## 2018-11-19 RX ADMIN — Medication 300 MILLIGRAM(S): at 06:13

## 2018-11-19 RX ADMIN — Medication 250 MILLIGRAM(S): at 18:44

## 2018-11-19 RX ADMIN — Medication 50 MILLIGRAM(S): at 06:14

## 2018-11-19 RX ADMIN — Medication 250 MILLIGRAM(S): at 06:14

## 2018-11-19 RX ADMIN — Medication 300 MILLIGRAM(S): at 18:45

## 2018-11-19 RX ADMIN — Medication 1 TABLET(S): at 12:26

## 2018-11-19 RX ADMIN — Medication 50 MILLIGRAM(S): at 22:18

## 2018-11-19 RX ADMIN — AMLODIPINE BESYLATE 10 MILLIGRAM(S): 2.5 TABLET ORAL at 06:13

## 2018-11-19 RX ADMIN — Medication 650 MILLIGRAM(S): at 00:11

## 2018-11-19 RX ADMIN — Medication 450 MILLIGRAM(S): at 22:18

## 2018-11-19 RX ADMIN — Medication 325 MILLIGRAM(S): at 22:18

## 2018-11-19 RX ADMIN — TAMSULOSIN HYDROCHLORIDE 0.4 MILLIGRAM(S): 0.4 CAPSULE ORAL at 22:18

## 2018-11-19 RX ADMIN — RIVAROXABAN 15 MILLIGRAM(S): KIT at 18:44

## 2018-11-19 RX ADMIN — ATORVASTATIN CALCIUM 20 MILLIGRAM(S): 80 TABLET, FILM COATED ORAL at 22:18

## 2018-11-19 NOTE — PROGRESS NOTE ADULT - ASSESSMENT
57 y/o male with ckd admitted for LLE cellulitis and acute on chronic renal failure      Problem/Plan - 1:  ·  Problem: Cellulitis of left lower extremity.  Plan: Likely secondary to poor skin integrity with underlying venous stasis dermatitis and DM-2. No sepsis criteria.   --> change abx to po clinda     Problem/Plan - 2:  ·  Problem: R/O GODWIN (acute kidney injury).  Plan: Meets criteria for GODWIN based on known Cr. of 2.4 in 10/18. Concern for obstructive uropathy with frequent urination possible from overflow incontinence. Likely has underlying hypertensive/DM nephropathy. Has history of nephrotic range proteinuria too as per patient. Will hold ACE/ARB, Metformin,  -->nephro folowing  --> improved     Problem/Plan - 3:  ·  Problem: Acute deep vein thrombosis (DVT) of popliteal vein of left lower extremity.  known hx of DVT.   -->  Xarelto, will adjust for renal dosing based on low GFR. Denies any bleeding, falls.      Problem/Plan - 4:  ·  Problem: CKD (chronic kidney disease) stage 3, GFR 30-59 ml/min.  renal following   --> bmp improved slightly      Problem/Plan - 5:  ·  Problem: Type 2 diabetes mellitus with stage 4 chronic kidney disease, without long-term current use of insulin.     --> a1c well controlled      Problem/Plan - 6:  Problem: Hypertension, unspecified type.     Problem/Plan - 7:  ·  Problem: Hyperlipidemia, unspecified hyperlipidemia type.  Plan: statin.      Problem/Plan - 8:  ·  Problem: Class 3 severe obesity with serious comorbidity in adult, unspecified BMI, unspecified obesity type.  Plan: counseled on diet/exercise.     dispo --> hopeful dc tomorrow if cultures remain negative and cellulitis improved

## 2018-11-19 NOTE — PROGRESS NOTE ADULT - SUBJECTIVE AND OBJECTIVE BOX
FRENCH DEY    [id number]    58y      Male    INTERVAL HPI/OVERNIGHT EVENTS:    patient being seen for cellulitis, dvt and acute on chronic renal failure. patient seen at bedside and states feeling better.       REVIEW OF SYSTEMS:    CONSTITUTIONAL: No fever, weight loss, or fatigue  RESPIRATORY: No cough, wheezing, hemoptysis; No shortness of breath  CARDIOVASCULAR: No chest pain, palpitations  GASTROINTESTINAL: No abdominal or epigastric pain. No nausea, vomiting  NEUROLOGICAL: No headaches, memory loss, loss of strength.  MISCELLANEOUS:      Vital Signs Last 24 Hrs  T(C): 36.6 (2018 17:05), Max: 36.6 (2018 17:05)  T(F): 97.8 (2018 17:05), Max: 97.8 (2018 17:05)  HR: 64 (2018 06:12) (64 - 72)  BP: 128/70 (2018 06:12) (122/60 - 144/68)  BP(mean): --  RR: 20 (2018 00:08) (18 - 20)  SpO2: 97% (2018 00:08) (97% - 97%)    PHYSICAL EXAM:    GENERAL: NAD, obese   HEENT: PERRL, +EOMI  NECK: soft, Supple, No JVD,   CHEST/LUNG: Clear to auscultation bilaterally; No wheezing  HEART: S1S2+, Regular rate and rhythm; No murmurs  ABDOMEN: Soft, Nontender,   EXTREMITIES:  LLE swelling with chronic skin changes, left> right   NEURO: AAOX3,  PSYCH: normal mood        LABS:                        9.7    11.0  )-----------( 121      ( 2018 08:30 )             30.7     11-19    140  |  104  |  63.0<H>  ----------------------------<  147<H>  4.1   |  24.0  |  3.02<H>    Ca    9.1      2018 08:30  Phos  3.9       Mg     2.2         TPro  6.8  /  Alb  3.5  /  TBili  0.7  /  DBili  x   /  AST  18  /  ALT  17  /  AlkPhos  72        Urinalysis Basic - ( 2018 16:19 )    Color: Yellow / Appearance: Clear / S.010 / pH: x  Gluc: x / Ketone: Negative  / Bili: Negative / Urobili: Negative mg/dL   Blood: x / Protein: 100 mg/dL / Nitrite: Negative   Leuk Esterase: Trace / RBC: 0-2 /HPF / WBC 3-5   Sq Epi: x / Non Sq Epi: Few / Bacteria: Occasional          MEDICATIONS  (STANDING):  amLODIPine   Tablet 10 milliGRAM(s) Oral daily  atorvastatin 20 milliGRAM(s) Oral at bedtime  clindamycin   Capsule 600 milliGRAM(s) Oral every 8 hours  dextrose 5%. 1000 milliLiter(s) (50 mL/Hr) IV Continuous <Continuous>  dextrose 50% Injectable 12.5 Gram(s) IV Push once  ferrous    sulfate 325 milliGRAM(s) Oral three times a day  hydrALAZINE 50 milliGRAM(s) Oral three times a day  insulin lispro (HumaLOG) corrective regimen sliding scale   SubCutaneous Before meals and at bedtime  labetalol 300 milliGRAM(s) Oral two times a day  multivitamin 1 Tablet(s) Oral daily  rivaroxaban 15 milliGRAM(s) Oral every 24 hours  saccharomyces boulardii 250 milliGRAM(s) Oral two times a day  sodium chloride 0.9% lock flush 3 milliLiter(s) IV Push every 8 hours  sodium chloride 0.9%. 1000 milliLiter(s) (100 mL/Hr) IV Continuous <Continuous>  tamsulosin 0.4 milliGRAM(s) Oral at bedtime    MEDICATIONS  (PRN):  acetaminophen   Tablet .. 650 milliGRAM(s) Oral every 6 hours PRN Temp greater or equal to 38C (100.4F), Mild Pain (1 - 3)  dextrose 40% Gel 15 Gram(s) Oral once PRN Blood Glucose LESS THAN 70 milliGRAM(s)/deciliter  glucagon  Injectable 1 milliGRAM(s) IntraMuscular once PRN Glucose LESS THAN 70 milligrams/deciliter  ondansetron Injectable 4 milliGRAM(s) IV Push every 6 hours PRN Nausea      RADIOLOGY & ADDITIONAL TESTS:

## 2018-11-19 NOTE — PROGRESS NOTE ADULT - SUBJECTIVE AND OBJECTIVE BOX
NEPHROLOGY INTERVAL HPI/OVERNIGHT EVENTS:    Examined earlier    MEDICATIONS  (STANDING):  amLODIPine   Tablet 10 milliGRAM(s) Oral daily  atorvastatin 20 milliGRAM(s) Oral at bedtime  clindamycin   Capsule 450 milliGRAM(s) Oral every 8 hours  dextrose 5%. 1000 milliLiter(s) (50 mL/Hr) IV Continuous <Continuous>  dextrose 50% Injectable 12.5 Gram(s) IV Push once  ferrous    sulfate 325 milliGRAM(s) Oral three times a day  hydrALAZINE 50 milliGRAM(s) Oral three times a day  insulin lispro (HumaLOG) corrective regimen sliding scale   SubCutaneous Before meals and at bedtime  labetalol 300 milliGRAM(s) Oral two times a day  multivitamin 1 Tablet(s) Oral daily  rivaroxaban 15 milliGRAM(s) Oral every 24 hours  saccharomyces boulardii 250 milliGRAM(s) Oral two times a day  sodium chloride 0.9% lock flush 3 milliLiter(s) IV Push every 8 hours  sodium chloride 0.9%. 1000 milliLiter(s) (100 mL/Hr) IV Continuous <Continuous>  tamsulosin 0.4 milliGRAM(s) Oral at bedtime    MEDICATIONS  (PRN):  acetaminophen   Tablet .. 650 milliGRAM(s) Oral every 6 hours PRN Temp greater or equal to 38C (100.4F), Mild Pain (1 - 3)  dextrose 40% Gel 15 Gram(s) Oral once PRN Blood Glucose LESS THAN 70 milliGRAM(s)/deciliter  glucagon  Injectable 1 milliGRAM(s) IntraMuscular once PRN Glucose LESS THAN 70 milligrams/deciliter  ondansetron Injectable 4 milliGRAM(s) IV Push every 6 hours PRN Nausea      Allergies    penicillin (Fever)    Intolerances        Vital Signs Last 24 Hrs  T(C): 36.8 (2018 15:58), Max: 36.9 (2018 11:00)  T(F): 98.2 (2018 15:58), Max: 98.4 (2018 11:00)  HR: 67 (2018 15:58) (64 - 73)  BP: 140/77 (2018 15:58) (109/63 - 144/68)  BP(mean): --  RR: 20 (2018 15:58) (19 - 20)  SpO2: 97% (2018 15:58) (96% - 97%)  Daily     Daily     PHYSICAL EXAM:  GENERAL: appears chronically ill, morbid obesity  HEAD:  NCAT  EYES: EOMI  NECK: Supple, neck  veins full  NERVOUS SYSTEM:  Alert & Oriented X3  CHEST/LUNG: Clear to percussion bilaterally  HEART: Regular rate and rhythm; No murmurs  ABDOMEN: Soft, Nontender, Nondistended; Bowel sounds present  EXTREMITIES:  2+ edema B/L LE erythematous L>>R    LABS:                        9.7    11.0  )-----------( 121      ( 2018 08:30 )             30.7         140  |  104  |  63.0<H>  ----------------------------<  147<H>  4.1   |  24.0  |  3.02<H>    Ca    9.1      2018 08:30  Phos  3.9       Mg     2.2         TPro  6.8  /  Alb  3.5  /  TBili  0.7  /  DBili  x   /  AST  18  /  ALT  17  /  AlkPhos  72        Urinalysis Basic - ( 2018 16:19 )    Color: Yellow / Appearance: Clear / S.010 / pH: x  Gluc: x / Ketone: Negative  / Bili: Negative / Urobili: Negative mg/dL   Blood: x / Protein: 100 mg/dL / Nitrite: Negative   Leuk Esterase: Trace / RBC: 0-2 /HPF / WBC 3-5   Sq Epi: x / Non Sq Epi: Few / Bacteria: Occasional      Phosphorus Level, Serum: 3.9 mg/dL ( @ 08:30)  Magnesium, Serum: 2.2 mg/dL ( @ 08:30)          RADIOLOGY & ADDITIONAL TESTS:

## 2018-11-19 NOTE — PROGRESS NOTE ADULT - ASSESSMENT
GODWIN on CKD cr baseline per pt approx 2.4 I will obtain office records  Progression of underlying dz HTN/ DM vs some degree of GODWIN   Slight improvement in cr w IVF    Suspect will ultimately need diuretics which will raise his cr  renal sono noted  If renal function UOP worsens will need HD discussed this w pt  Avoid NSAID nephrotoxic agents   Will check 24 hr urine study- ongoing  cellulitis L LE  Renally dose abx  HTN BP controlled on current regime  meds reviewed    Will follow

## 2018-11-20 ENCOUNTER — TRANSCRIPTION ENCOUNTER (OUTPATIENT)
Age: 58
End: 2018-11-20

## 2018-11-20 LAB
ANION GAP SERPL CALC-SCNC: 11 MMOL/L — SIGNIFICANT CHANGE UP (ref 5–17)
BUN SERPL-MCNC: 58 MG/DL — HIGH (ref 8–20)
CALCIUM SERPL-MCNC: 9 MG/DL — SIGNIFICANT CHANGE UP (ref 8.6–10.2)
CHLORIDE SERPL-SCNC: 105 MMOL/L — SIGNIFICANT CHANGE UP (ref 98–107)
CO2 SERPL-SCNC: 21 MMOL/L — LOW (ref 22–29)
CREAT SERPL-MCNC: 2.72 MG/DL — HIGH (ref 0.5–1.3)
GLUCOSE BLDC GLUCOMTR-MCNC: 123 MG/DL — HIGH (ref 70–99)
GLUCOSE BLDC GLUCOMTR-MCNC: 136 MG/DL — HIGH (ref 70–99)
GLUCOSE BLDC GLUCOMTR-MCNC: 142 MG/DL — HIGH (ref 70–99)
GLUCOSE BLDC GLUCOMTR-MCNC: 159 MG/DL — HIGH (ref 70–99)
GLUCOSE SERPL-MCNC: 126 MG/DL — HIGH (ref 70–115)
HCT VFR BLD CALC: 29.7 % — LOW (ref 42–52)
HGB BLD-MCNC: 9.4 G/DL — LOW (ref 14–18)
IRON SATN MFR SERPL: 18 UG/DL — LOW (ref 59–158)
IRON SATN MFR SERPL: 8 % — LOW (ref 16–55)
MAGNESIUM SERPL-MCNC: 2.2 MG/DL — SIGNIFICANT CHANGE UP (ref 1.6–2.6)
MCHC RBC-ENTMCNC: 25.2 PG — LOW (ref 27–31)
MCHC RBC-ENTMCNC: 31.6 G/DL — LOW (ref 32–36)
MCV RBC AUTO: 79.6 FL — LOW (ref 80–94)
PLATELET # BLD AUTO: 139 K/UL — LOW (ref 150–400)
POTASSIUM SERPL-MCNC: 4.3 MMOL/L — SIGNIFICANT CHANGE UP (ref 3.5–5.3)
POTASSIUM SERPL-SCNC: 4.3 MMOL/L — SIGNIFICANT CHANGE UP (ref 3.5–5.3)
RBC # BLD: 3.73 M/UL — LOW (ref 4.6–6.2)
RBC # FLD: 17.5 % — HIGH (ref 11–15.6)
SODIUM SERPL-SCNC: 137 MMOL/L — SIGNIFICANT CHANGE UP (ref 135–145)
TIBC SERPL-MCNC: 220 UG/DL — SIGNIFICANT CHANGE UP (ref 220–430)
TRANSFERRIN SERPL-MCNC: 154 MG/DL — LOW (ref 180–329)
VANCOMYCIN TROUGH SERPL-MCNC: 8.1 UG/ML — LOW (ref 10–20)
WBC # BLD: 11 K/UL — HIGH (ref 4.8–10.8)
WBC # FLD AUTO: 11 K/UL — HIGH (ref 4.8–10.8)

## 2018-11-20 PROCEDURE — 99232 SBSQ HOSP IP/OBS MODERATE 35: CPT

## 2018-11-20 RX ORDER — SODIUM BICARBONATE 1 MEQ/ML
650 SYRINGE (ML) INTRAVENOUS
Qty: 0 | Refills: 0 | Status: DISCONTINUED | OUTPATIENT
Start: 2018-11-20 | End: 2018-11-21

## 2018-11-20 RX ORDER — LABETALOL HCL 100 MG
1 TABLET ORAL
Qty: 0 | Refills: 0 | COMMUNITY
Start: 2018-11-20

## 2018-11-20 RX ORDER — FERROUS SULFATE 325(65) MG
1 TABLET ORAL
Qty: 90 | Refills: 0 | OUTPATIENT
Start: 2018-11-20 | End: 2018-12-19

## 2018-11-20 RX ORDER — ATORVASTATIN CALCIUM 80 MG/1
1 TABLET, FILM COATED ORAL
Qty: 0 | Refills: 0 | COMMUNITY
Start: 2018-11-20

## 2018-11-20 RX ORDER — TELMISARTAN AND AMLODIPINE 40; 5 MG/1; MG/1
1 TABLET ORAL
Qty: 0 | Refills: 0 | COMMUNITY

## 2018-11-20 RX ORDER — LABETALOL HCL 100 MG
300 TABLET ORAL
Qty: 0 | Refills: 0 | COMMUNITY

## 2018-11-20 RX ORDER — AMLODIPINE BESYLATE 2.5 MG/1
1 TABLET ORAL
Qty: 30 | Refills: 0 | OUTPATIENT
Start: 2018-11-20 | End: 2018-12-19

## 2018-11-20 RX ORDER — METFORMIN HYDROCHLORIDE 850 MG/1
850 TABLET ORAL
Qty: 0 | Refills: 0 | COMMUNITY

## 2018-11-20 RX ORDER — HYDRALAZINE HCL 50 MG
1 TABLET ORAL
Qty: 0 | Refills: 0 | COMMUNITY
Start: 2018-11-20

## 2018-11-20 RX ORDER — HYDRALAZINE HCL 50 MG
1 TABLET ORAL
Qty: 0 | Refills: 0 | COMMUNITY

## 2018-11-20 RX ORDER — RIVAROXABAN 15 MG-20MG
1 KIT ORAL
Qty: 30 | Refills: 0 | OUTPATIENT
Start: 2018-11-20 | End: 2018-12-19

## 2018-11-20 RX ORDER — SACCHAROMYCES BOULARDII 250 MG
1 POWDER IN PACKET (EA) ORAL
Qty: 14 | Refills: 0
Start: 2018-11-20 | End: 2018-11-26

## 2018-11-20 RX ORDER — TAMSULOSIN HYDROCHLORIDE 0.4 MG/1
1 CAPSULE ORAL
Qty: 30 | Refills: 0 | OUTPATIENT
Start: 2018-11-20 | End: 2018-12-19

## 2018-11-20 RX ORDER — ATORVASTATIN CALCIUM 80 MG/1
1 TABLET, FILM COATED ORAL
Qty: 0 | Refills: 0 | COMMUNITY

## 2018-11-20 RX ADMIN — RIVAROXABAN 15 MILLIGRAM(S): KIT at 17:37

## 2018-11-20 RX ADMIN — Medication 50 MILLIGRAM(S): at 05:40

## 2018-11-20 RX ADMIN — SODIUM CHLORIDE 3 MILLILITER(S): 9 INJECTION INTRAMUSCULAR; INTRAVENOUS; SUBCUTANEOUS at 05:49

## 2018-11-20 RX ADMIN — ATORVASTATIN CALCIUM 20 MILLIGRAM(S): 80 TABLET, FILM COATED ORAL at 21:16

## 2018-11-20 RX ADMIN — Medication 300 MILLIGRAM(S): at 05:40

## 2018-11-20 RX ADMIN — Medication 250 MILLIGRAM(S): at 17:37

## 2018-11-20 RX ADMIN — Medication 450 MILLIGRAM(S): at 05:40

## 2018-11-20 RX ADMIN — AMLODIPINE BESYLATE 10 MILLIGRAM(S): 2.5 TABLET ORAL at 05:40

## 2018-11-20 RX ADMIN — Medication 1 TABLET(S): at 11:18

## 2018-11-20 RX ADMIN — Medication 50 MILLIGRAM(S): at 16:14

## 2018-11-20 RX ADMIN — Medication 300 MILLIGRAM(S): at 17:37

## 2018-11-20 RX ADMIN — Medication 450 MILLIGRAM(S): at 21:16

## 2018-11-20 RX ADMIN — Medication 325 MILLIGRAM(S): at 21:16

## 2018-11-20 RX ADMIN — Medication 325 MILLIGRAM(S): at 16:14

## 2018-11-20 RX ADMIN — TAMSULOSIN HYDROCHLORIDE 0.4 MILLIGRAM(S): 0.4 CAPSULE ORAL at 21:17

## 2018-11-20 RX ADMIN — Medication 325 MILLIGRAM(S): at 05:40

## 2018-11-20 RX ADMIN — Medication 50 MILLIGRAM(S): at 21:16

## 2018-11-20 RX ADMIN — Medication 250 MILLIGRAM(S): at 05:40

## 2018-11-20 RX ADMIN — SODIUM CHLORIDE 3 MILLILITER(S): 9 INJECTION INTRAMUSCULAR; INTRAVENOUS; SUBCUTANEOUS at 16:09

## 2018-11-20 RX ADMIN — SODIUM CHLORIDE 3 MILLILITER(S): 9 INJECTION INTRAMUSCULAR; INTRAVENOUS; SUBCUTANEOUS at 21:19

## 2018-11-20 RX ADMIN — Medication 450 MILLIGRAM(S): at 16:14

## 2018-11-20 RX ADMIN — Medication 650 MILLIGRAM(S): at 22:44

## 2018-11-20 RX ADMIN — Medication 2: at 08:02

## 2018-11-20 NOTE — DISCHARGE NOTE ADULT - PLAN OF CARE
STOP METFORMIN dc with low dose glpixide home emds home meds imrpvoed NO MORE ARB OR METOFRMIN dc with po abx follow up with pcp xarelto renally dosed follow up with renal and priomary new baseline seems to be 2.6-2.8  may need hd in the future as per renal

## 2018-11-20 NOTE — DISCHARGE NOTE ADULT - HOSPITAL COURSE
57 y/o male with history of morbid obesity, LE DVT in s/p IVC filter on Xarelto, CKD-3 with baseline Cr. of 2.4 as of 10/18, HTN, DM-2 last A1c 6.8, HLD, NOBLE on CPAP presents with 3 days of increasing LLE pain, swelling, and redness. He feels he scratched a few days prior and has not been wearing his stockings causing it to get infected. He denies any other trauma. He admits to subjective fevers, and chills.  In the ED he was noted to have LLE cellulitis on top of venous stasis dermatitis as well as GODWIN on CKD.     patient admitted to medicine and seen by renal in consult. renal us showed renal disease. duplex showed dvt. patient cultured which were negative and improved clincially. patients baseline creatinein is 2.4 and unsure why he is on metformin. TOLD TO DC METFORMIN AND WILL BE STARTED ON LOW DOSE glipizide. patiwent is stable for dc home with po abx    time spent on dc 32 mintues 57 y/o male with history of morbid obesity, LE DVT in s/p IVC filter on Xarelto, CKD-3 with baseline Cr. of 2.4 as of 10/18, HTN, DM-2 last A1c 6.8, HLD, NOBLE on CPAP presents with 3 days of increasing LLE pain, swelling, and redness. He feels he scratched a few days prior and has not been wearing his stockings causing it to get infected. He denies any other trauma. He admits to subjective fevers, and chills.  In the ED he was noted to have LLE cellulitis on top of venous stasis dermatitis as well as GODWIN on CKD.     patient admitted to medicine and seen by renal in consult. renal us showed renal disease. duplex showed dvt. patient cultured which were negative and improved clincially. patients baseline creatinein is 2.4 and unsure why he is on metformin. TOLD TO DC METFORMIN AND WILL BE STARTED ON LOW DOSE glipizide. patiwent is stable for dc home with po abx    time spent on dc 32 mintues  as per renal may need hd next 6 months - 1 year

## 2018-11-20 NOTE — DISCHARGE NOTE ADULT - SECONDARY DIAGNOSIS.
DM (diabetes mellitus) HTN (hypertension) HLD (hyperlipidemia) Acute on chronic renal failure Acute deep vein thrombosis (DVT) of popliteal vein of left lower extremity CKD (chronic kidney disease) stage 3, GFR 30-59 ml/min

## 2018-11-20 NOTE — PROGRESS NOTE ADULT - ASSESSMENT
57 y/o male with ckd admitted for LLE cellulitis and acute on chronic renal failure      Problem/Plan - 1:  ·  Problem: Cellulitis of left lower extremity.  Plan: Likely secondary to poor skin integrity with underlying venous stasis dermatitis and DM-2. No sepsis criteria.   --> c/w po clinda  --> all cultures negative     Problem/Plan - 2:  ·  Problem: R/O GODWIN (acute kidney injury).  Plan: Meets criteria for GODWIN based on known Cr. of 2.4 in 10/18. Concern for obstructive uropathy with frequent urination possible from overflow incontinence. Likely has underlying hypertensive/DM nephropathy. Has history of nephrotic range proteinuria too as per patient. Will hold ACE/ARB, Metformin,  -->nephro folowing  --> improving      Problem/Plan - 3:  ·  Problem: Acute deep vein thrombosis (DVT) of popliteal vein of left lower extremity.  known hx of DVT.   -->  Xarelto, will adjust for renal dosing based on low GFR. Denies any bleeding, falls.      Problem/Plan - 4:  ·  Problem: CKD (chronic kidney disease) stage 3, GFR 30-59 ml/min.  renal following   --> bmp improved slightly      Problem/Plan - 5:  ·  Problem: Type 2 diabetes mellitus with stage 4 chronic kidney disease, without long-term current use of insulin.     --> a1c well controlled      Problem/Plan - 6:  Problem: Hypertension, unspecified type.     Problem/Plan - 7:  ·  Problem: Hyperlipidemia, unspecified hyperlipidemia type.  Plan: statin.      Problem/Plan - 8:  ·  Problem: Class 3 severe obesity with serious comorbidity in adult, unspecified BMI, unspecified obesity type.  Plan: counseled on diet/exercise.     dispo --> continue to monitor creatinine   dc tomorrow

## 2018-11-20 NOTE — DISCHARGE NOTE ADULT - PATIENT PORTAL LINK FT
You can access the [facility] Patient Portal, offered by [facility], by registering with the following website: [email or web address]

## 2018-11-20 NOTE — PROGRESS NOTE ADULT - SUBJECTIVE AND OBJECTIVE BOX
NEPHROLOGY INTERVAL HPI/OVERNIGHT EVENTS:    Examined earlier    MEDICATIONS  (STANDING):  amLODIPine   Tablet 10 milliGRAM(s) Oral daily  atorvastatin 20 milliGRAM(s) Oral at bedtime  clindamycin   Capsule 450 milliGRAM(s) Oral every 8 hours  dextrose 5%. 1000 milliLiter(s) (50 mL/Hr) IV Continuous <Continuous>  dextrose 50% Injectable 12.5 Gram(s) IV Push once  ferrous    sulfate 325 milliGRAM(s) Oral three times a day  hydrALAZINE 50 milliGRAM(s) Oral three times a day  insulin lispro (HumaLOG) corrective regimen sliding scale   SubCutaneous Before meals and at bedtime  labetalol 300 milliGRAM(s) Oral two times a day  melatonin 3 milliGRAM(s) Oral once  multivitamin 1 Tablet(s) Oral daily  rivaroxaban 15 milliGRAM(s) Oral every 24 hours  saccharomyces boulardii 250 milliGRAM(s) Oral two times a day  sodium bicarbonate 650 milliGRAM(s) Oral two times a day  sodium chloride 0.9% lock flush 3 milliLiter(s) IV Push every 8 hours  sodium chloride 0.9%. 1000 milliLiter(s) (100 mL/Hr) IV Continuous <Continuous>  tamsulosin 0.4 milliGRAM(s) Oral at bedtime    MEDICATIONS  (PRN):  acetaminophen   Tablet .. 650 milliGRAM(s) Oral every 6 hours PRN Temp greater or equal to 38C (100.4F), Mild Pain (1 - 3)  dextrose 40% Gel 15 Gram(s) Oral once PRN Blood Glucose LESS THAN 70 milliGRAM(s)/deciliter  glucagon  Injectable 1 milliGRAM(s) IntraMuscular once PRN Glucose LESS THAN 70 milligrams/deciliter  ondansetron Injectable 4 milliGRAM(s) IV Push every 6 hours PRN Nausea      Allergies    penicillin (Fever)    Intolerances        Vital Signs Last 24 Hrs  T(C): 37.1 (2018 07:52), Max: 37.1 (2018 00:21)  T(F): 98.8 (2018 07:52), Max: 98.8 (2018 07:52)  HR: 63 (2018 07:52) (63 - 69)  BP: 134/74 (2018 07:52) (128/62 - 140/77)  BP(mean): --  RR: 20 (2018 07:52) (20 - 20)  SpO2: 100% (2018 07:52) (95% - 100%)  Daily     Daily     PHYSICAL EXAM:  GENERAL: appears chronically ill, morbid obesity  HEAD:  NCAT  EYES: EOMI  NECK: Supple, neck  veins full  NERVOUS SYSTEM:  Alert & Oriented X3  CHEST/LUNG: Clear to percussion bilaterally  HEART: Regular rate and rhythm; No murmurs  ABDOMEN: Soft, Nontender, Nondistended; Bowel sounds present  EXTREMITIES:  2+ edema B/L LE erythematous L>>R    LABS:                        9.4    11.0  )-----------( 139      ( 2018 04:04 )             29.7     11-20    137  |  105  |  58.0<H>  ----------------------------<  126<H>  4.3   |  21.0<L>  |  2.72<H>    Ca    9.0      2018 04:04  Phos  3.9       Mg     2.2             Urinalysis Basic - ( 2018 16:19 )    Color: Yellow / Appearance: Clear / S.010 / pH: x  Gluc: x / Ketone: Negative  / Bili: Negative / Urobili: Negative mg/dL   Blood: x / Protein: 100 mg/dL / Nitrite: Negative   Leuk Esterase: Trace / RBC: 0-2 /HPF / WBC 3-5   Sq Epi: x / Non Sq Epi: Few / Bacteria: Occasional      Magnesium, Serum: 2.2 mg/dL ( @ 04:04)          RADIOLOGY & ADDITIONAL TESTS:

## 2018-11-20 NOTE — DISCHARGE NOTE ADULT - CARE PLAN
Principal Discharge DX:	Cellulitis of left lower extremity  Goal:	dc with po abx  Assessment and plan of treatment:	follow up with pcp  Secondary Diagnosis:	Acute deep vein thrombosis (DVT) of popliteal vein of left lower extremity  Goal:	xarelto renally dosed  Secondary Diagnosis:	CKD (chronic kidney disease) stage 3, GFR 30-59 ml/min  Goal:	follow up with renal and priomary  Secondary Diagnosis:	DM (diabetes mellitus)  Goal:	STOP METFORMIN  Assessment and plan of treatment:	dc with low dose glpixide  Secondary Diagnosis:	HTN (hypertension)  Goal:	home emds  Secondary Diagnosis:	HLD (hyperlipidemia)  Goal:	home meds  Secondary Diagnosis:	Acute on chronic renal failure  Goal:	imrpvoed  Assessment and plan of treatment:	NO MORE ARB OR METOFRMIN Principal Discharge DX:	Cellulitis of left lower extremity  Goal:	dc with po abx  Assessment and plan of treatment:	follow up with pcp  Secondary Diagnosis:	Acute deep vein thrombosis (DVT) of popliteal vein of left lower extremity  Goal:	xarelto renally dosed  Secondary Diagnosis:	CKD (chronic kidney disease) stage 3, GFR 30-59 ml/min  Goal:	follow up with renal and priomary  Assessment and plan of treatment:	new baseline seems to be 2.6-2.8  may need hd in the future as per renal  Secondary Diagnosis:	DM (diabetes mellitus)  Goal:	STOP METFORMIN  Assessment and plan of treatment:	dc with low dose glpixide  Secondary Diagnosis:	HTN (hypertension)  Goal:	home emds  Secondary Diagnosis:	HLD (hyperlipidemia)  Goal:	home meds  Secondary Diagnosis:	Acute on chronic renal failure  Goal:	imrpvoed  Assessment and plan of treatment:	NO MORE ARB OR METOFRMIN

## 2018-11-20 NOTE — PROGRESS NOTE ADULT - ASSESSMENT
GODWIN on CKD cr improving baseline approx 2.4   Suspect progression of underlying dz HTN/ DM     Suspect will ultimately need diuretics which will raise his cr  renal sono noted  If renal function UOP worsens will need HD discussed this w pt  Avoid NSAID nephrotoxic agents   Will check 24 hr urine study- 1200mg proteinuria  cellulitis L LE on abx    HTN BP controlled on current regime    Will follow

## 2018-11-20 NOTE — DISCHARGE NOTE ADULT - CARE PROVIDER_API CALL
Ladinsky, Michael A (), Family Medicine; Geriatric Medicine  [address]  [address]  [place] [address]  Phone: [phone]  Fax: [phone]    Jose Miguel Reyez), Internal Medicine; Nephrology  [address]  [place], [place] [id number]  Phone: [phone]  Fax: [phone]

## 2018-11-20 NOTE — DISCHARGE NOTE ADULT - MEDICATION SUMMARY - MEDICATIONS TO STOP TAKING
I will STOP taking the medications listed below when I get home from the hospital:    telmisartan-amlodipine 40 mg-10 mg oral tablet  -- 1 tab(s) by mouth once a day    metFORMIN  -- 850 milligram(s) by mouth 2 times a day

## 2018-11-20 NOTE — PROGRESS NOTE ADULT - SUBJECTIVE AND OBJECTIVE BOX
FRENCH DEY    [id number]    58y      Male    INTERVAL HPI/OVERNIGHT EVENTS:    patient being seen for cellulitis and acute on chronic renal failure. patient seen at bedside and states leg was weeping.       REVIEW OF SYSTEMS:    CONSTITUTIONAL: leg weeping   RESPIRATORY: No cough, wheezing, hemoptysis; No shortness of breath  CARDIOVASCULAR: No chest pain, palpitations  GASTROINTESTINAL: No abdominal or epigastric pain. No nausea, vomiting  NEUROLOGICAL: No headaches, memory loss, loss of strength.  MISCELLANEOUS:      Vital Signs Last 24 Hrs  T(C): 37.1 (2018 07:52), Max: 37.1 (2018 00:21)  T(F): 98.8 (2018 07:52), Max: 98.8 (2018 07:52)  HR: 63 (2018 07:52) (63 - 69)  BP: 134/74 (2018 07:52) (128/62 - 140/77)  BP(mean): --  RR: 20 (2018 07:52) (20 - 20)  SpO2: 100% (2018 07:52) (95% - 100%)    PHYSICAL EXAM:    GENERAL: NAD, obese   HEENT: PERRL, +EOMI  NECK: soft, Supple, No JVD,   CHEST/LUNG: Clear to auscultation bilaterally; No wheezing  HEART: S1S2+, Regular rate and rhythm; No murmurs  ABDOMEN: Soft, Nontender,   EXTREMITIES:  LLE swelling with chronic skin changes, left> right   NEURO: AAOX3,  PSYCH: normal mood    LABS:                        9.4    11.0  )-----------( 139      ( 2018 04:04 )             29.7     11-20    137  |  105  |  58.0<H>  ----------------------------<  126<H>  4.3   |  21.0<L>  |  2.72<H>    Ca    9.0      2018 04:04  Phos  3.9     11-19  Mg     2.2     -20        Urinalysis Basic - ( 2018 16:19 )    Color: Yellow / Appearance: Clear / S.010 / pH: x  Gluc: x / Ketone: Negative  / Bili: Negative / Urobili: Negative mg/dL   Blood: x / Protein: 100 mg/dL / Nitrite: Negative   Leuk Esterase: Trace / RBC: 0-2 /HPF / WBC 3-5   Sq Epi: x / Non Sq Epi: Few / Bacteria: Occasional          MEDICATIONS  (STANDING):  amLODIPine   Tablet 10 milliGRAM(s) Oral daily  atorvastatin 20 milliGRAM(s) Oral at bedtime  clindamycin   Capsule 450 milliGRAM(s) Oral every 8 hours  dextrose 5%. 1000 milliLiter(s) (50 mL/Hr) IV Continuous <Continuous>  dextrose 50% Injectable 12.5 Gram(s) IV Push once  ferrous    sulfate 325 milliGRAM(s) Oral three times a day  hydrALAZINE 50 milliGRAM(s) Oral three times a day  insulin lispro (HumaLOG) corrective regimen sliding scale   SubCutaneous Before meals and at bedtime  labetalol 300 milliGRAM(s) Oral two times a day  melatonin 3 milliGRAM(s) Oral once  multivitamin 1 Tablet(s) Oral daily  rivaroxaban 15 milliGRAM(s) Oral every 24 hours  saccharomyces boulardii 250 milliGRAM(s) Oral two times a day  sodium chloride 0.9% lock flush 3 milliLiter(s) IV Push every 8 hours  sodium chloride 0.9%. 1000 milliLiter(s) (100 mL/Hr) IV Continuous <Continuous>  tamsulosin 0.4 milliGRAM(s) Oral at bedtime    MEDICATIONS  (PRN):  acetaminophen   Tablet .. 650 milliGRAM(s) Oral every 6 hours PRN Temp greater or equal to 38C (100.4F), Mild Pain (1 - 3)  dextrose 40% Gel 15 Gram(s) Oral once PRN Blood Glucose LESS THAN 70 milliGRAM(s)/deciliter  glucagon  Injectable 1 milliGRAM(s) IntraMuscular once PRN Glucose LESS THAN 70 milligrams/deciliter  ondansetron Injectable 4 milliGRAM(s) IV Push every 6 hours PRN Nausea      RADIOLOGY & ADDITIONAL TESTS:

## 2018-11-20 NOTE — DISCHARGE NOTE ADULT - MEDICATION SUMMARY - MEDICATIONS TO TAKE
I will START or STAY ON the medications listed below when I get home from the hospital:    tamsulosin 0.4 mg oral capsule  -- 1 cap(s) by mouth once a day (at bedtime)  -- Indication: For bph    rivaroxaban 15 mg oral tablet  -- 1 tab(s) by mouth every 24 hours  -- Indication: For DVT (deep venous thrombosis)    glipiZIDE 2.5 mg oral tablet, extended release  -- 1 tab(s) by mouth once a day   -- Avoid prolonged or excessive exposure to direct and/or artificial sunlight while taking this medication.  Do not drink alcoholic beverages when taking this medication.  It is very important that you take or use this exactly as directed.  Do not skip doses or discontinue unless directed by your doctor.  Swallow whole.  Do not crush.    -- Indication: For DM (diabetes mellitus)    atorvastatin 20 mg oral tablet  -- 1 tab(s) by mouth once a day (at bedtime)  -- Indication: For Hyperlipidmiea    labetalol 300 mg oral tablet  -- 1 tab(s) by mouth 2 times a day  -- Indication: For HTN (hypertension)    Norvasc 10 mg oral tablet  -- 1 tab(s) by mouth once a day   -- It is very important that you take or use this exactly as directed.  Do not skip doses or discontinue unless directed by your doctor.  Some non-prescription drugs may aggravate your condition.  Read all labels carefully.  If a warning appears, check with your doctor before taking.    -- Indication: For HTN (hypertension)    ferrous sulfate 325 mg (65 mg elemental iron) oral tablet  -- 1 tab(s) by mouth 3 times a day   -- Check with your doctor before becoming pregnant.  Do not chew, break, or crush.  May discolor urine or feces.    -- Indication: For Anemia    clindamycin 150 mg oral capsule  -- 3 cap(s) by mouth every 8 hours  -- Indication: For Cellulitis    saccharomyces boulardii lyo 250 mg oral capsule  -- 1 cap(s) by mouth 2 times a day  -- Indication: For Probiotic    hydrALAZINE 50 mg oral tablet  -- 1 tab(s) by mouth 3 times a day  -- Indication: For HTN (hypertension)    Multiple Vitamins oral tablet  -- 1 tab(s) by mouth once a day  -- Indication: For vitamins I will START or STAY ON the medications listed below when I get home from the hospital:    losartan 25 mg oral tablet  -- 1 tab(s) by mouth once a day  -- Indication: For HTN (hypertension)    sodium bicarbonate 650 mg oral tablet  -- 1 tab(s) by mouth 2 times a day  -- Indication: For CKD (chronic kidney disease) stage 3, GFR 30-59 ml/min    tamsulosin 0.4 mg oral capsule  -- 1 cap(s) by mouth once a day (at bedtime)  -- Indication: For bph    rivaroxaban 15 mg oral tablet  -- 1 tab(s) by mouth every 24 hours  -- Indication: For DVT (deep venous thrombosis)    glipiZIDE 2.5 mg oral tablet, extended release  -- 1 tab(s) by mouth once a day   -- Avoid prolonged or excessive exposure to direct and/or artificial sunlight while taking this medication.  Do not drink alcoholic beverages when taking this medication.  It is very important that you take or use this exactly as directed.  Do not skip doses or discontinue unless directed by your doctor.  Swallow whole.  Do not crush.    -- Indication: For DM (diabetes mellitus)    atorvastatin 20 mg oral tablet  -- 1 tab(s) by mouth once a day (at bedtime)  -- Indication: For Hyperlipidmiea    labetalol 300 mg oral tablet  -- 1 tab(s) by mouth 2 times a day  -- Indication: For HTN (hypertension)    Norvasc 10 mg oral tablet  -- 1 tab(s) by mouth once a day   -- It is very important that you take or use this exactly as directed.  Do not skip doses or discontinue unless directed by your doctor.  Some non-prescription drugs may aggravate your condition.  Read all labels carefully.  If a warning appears, check with your doctor before taking.    -- Indication: For HTN (hypertension)    ferrous sulfate 325 mg (65 mg elemental iron) oral tablet  -- 1 tab(s) by mouth 3 times a day   -- Check with your doctor before becoming pregnant.  Do not chew, break, or crush.  May discolor urine or feces.    -- Indication: For Anemia    clindamycin 150 mg oral capsule  -- 3 cap(s) by mouth every 8 hours  -- Indication: For Cellulitis    saccharomyces boulardii lyo 250 mg oral capsule  -- 1 cap(s) by mouth 2 times a day  -- Indication: For Probiotic    hydrALAZINE 50 mg oral tablet  -- 1 tab(s) by mouth 3 times a day  -- Indication: For HTN (hypertension)    Multiple Vitamins oral tablet  -- 1 tab(s) by mouth once a day  -- Indication: For vitamins

## 2018-11-21 VITALS
DIASTOLIC BLOOD PRESSURE: 70 MMHG | HEART RATE: 68 BPM | TEMPERATURE: 99 F | RESPIRATION RATE: 20 BRPM | SYSTOLIC BLOOD PRESSURE: 157 MMHG

## 2018-11-21 LAB
ANION GAP SERPL CALC-SCNC: 13 MMOL/L — SIGNIFICANT CHANGE UP (ref 5–17)
BUN SERPL-MCNC: 59 MG/DL — HIGH (ref 8–20)
CALCIUM SERPL-MCNC: 9 MG/DL — SIGNIFICANT CHANGE UP (ref 8.6–10.2)
CHLORIDE SERPL-SCNC: 109 MMOL/L — HIGH (ref 98–107)
CO2 SERPL-SCNC: 21 MMOL/L — LOW (ref 22–29)
CREAT SERPL-MCNC: 2.83 MG/DL — HIGH (ref 0.5–1.3)
GLUCOSE BLDC GLUCOMTR-MCNC: 140 MG/DL — HIGH (ref 70–99)
GLUCOSE BLDC GLUCOMTR-MCNC: 142 MG/DL — HIGH (ref 70–99)
GLUCOSE SERPL-MCNC: 132 MG/DL — HIGH (ref 70–115)
MAGNESIUM SERPL-MCNC: 2.3 MG/DL — SIGNIFICANT CHANGE UP (ref 1.6–2.6)
POTASSIUM SERPL-MCNC: 4.6 MMOL/L — SIGNIFICANT CHANGE UP (ref 3.5–5.3)
POTASSIUM SERPL-SCNC: 4.6 MMOL/L — SIGNIFICANT CHANGE UP (ref 3.5–5.3)
SODIUM SERPL-SCNC: 143 MMOL/L — SIGNIFICANT CHANGE UP (ref 135–145)

## 2018-11-21 PROCEDURE — 84550 ASSAY OF BLOOD/URIC ACID: CPT

## 2018-11-21 PROCEDURE — 83036 HEMOGLOBIN GLYCOSYLATED A1C: CPT

## 2018-11-21 PROCEDURE — 83540 ASSAY OF IRON: CPT

## 2018-11-21 PROCEDURE — 84100 ASSAY OF PHOSPHORUS: CPT

## 2018-11-21 PROCEDURE — 80053 COMPREHEN METABOLIC PANEL: CPT

## 2018-11-21 PROCEDURE — 84156 ASSAY OF PROTEIN URINE: CPT

## 2018-11-21 PROCEDURE — 36415 COLL VENOUS BLD VENIPUNCTURE: CPT

## 2018-11-21 PROCEDURE — 94660 CPAP INITIATION&MGMT: CPT

## 2018-11-21 PROCEDURE — 87086 URINE CULTURE/COLONY COUNT: CPT

## 2018-11-21 PROCEDURE — 99239 HOSP IP/OBS DSCHRG MGMT >30: CPT

## 2018-11-21 PROCEDURE — 76775 US EXAM ABDO BACK WALL LIM: CPT

## 2018-11-21 PROCEDURE — 83605 ASSAY OF LACTIC ACID: CPT

## 2018-11-21 PROCEDURE — 84466 ASSAY OF TRANSFERRIN: CPT

## 2018-11-21 PROCEDURE — 73590 X-RAY EXAM OF LOWER LEG: CPT

## 2018-11-21 PROCEDURE — 81001 URINALYSIS AUTO W/SCOPE: CPT

## 2018-11-21 PROCEDURE — 87040 BLOOD CULTURE FOR BACTERIA: CPT

## 2018-11-21 PROCEDURE — 85027 COMPLETE CBC AUTOMATED: CPT

## 2018-11-21 PROCEDURE — 83550 IRON BINDING TEST: CPT

## 2018-11-21 PROCEDURE — 93971 EXTREMITY STUDY: CPT

## 2018-11-21 PROCEDURE — 80202 ASSAY OF VANCOMYCIN: CPT

## 2018-11-21 PROCEDURE — 82962 GLUCOSE BLOOD TEST: CPT

## 2018-11-21 PROCEDURE — 83735 ASSAY OF MAGNESIUM: CPT

## 2018-11-21 PROCEDURE — 96374 THER/PROPH/DIAG INJ IV PUSH: CPT

## 2018-11-21 PROCEDURE — 99285 EMERGENCY DEPT VISIT HI MDM: CPT | Mod: 25

## 2018-11-21 PROCEDURE — 82570 ASSAY OF URINE CREATININE: CPT

## 2018-11-21 PROCEDURE — 80048 BASIC METABOLIC PNL TOTAL CA: CPT

## 2018-11-21 RX ORDER — LOSARTAN POTASSIUM 100 MG/1
1 TABLET, FILM COATED ORAL
Qty: 30 | Refills: 0 | OUTPATIENT
Start: 2018-11-21 | End: 2018-12-20

## 2018-11-21 RX ORDER — SODIUM BICARBONATE 1 MEQ/ML
1 SYRINGE (ML) INTRAVENOUS
Qty: 60 | Refills: 0 | OUTPATIENT
Start: 2018-11-21 | End: 2018-12-20

## 2018-11-21 RX ORDER — LOSARTAN POTASSIUM 100 MG/1
25 TABLET, FILM COATED ORAL DAILY
Qty: 0 | Refills: 0 | Status: DISCONTINUED | OUTPATIENT
Start: 2018-11-21 | End: 2018-11-21

## 2018-11-21 RX ADMIN — Medication 50 MILLIGRAM(S): at 15:01

## 2018-11-21 RX ADMIN — Medication 650 MILLIGRAM(S): at 00:08

## 2018-11-21 RX ADMIN — AMLODIPINE BESYLATE 10 MILLIGRAM(S): 2.5 TABLET ORAL at 05:03

## 2018-11-21 RX ADMIN — Medication 650 MILLIGRAM(S): at 05:03

## 2018-11-21 RX ADMIN — Medication 50 MILLIGRAM(S): at 05:03

## 2018-11-21 RX ADMIN — Medication 1 TABLET(S): at 11:38

## 2018-11-21 RX ADMIN — Medication 450 MILLIGRAM(S): at 15:01

## 2018-11-21 RX ADMIN — Medication 650 MILLIGRAM(S): at 01:08

## 2018-11-21 RX ADMIN — SODIUM CHLORIDE 3 MILLILITER(S): 9 INJECTION INTRAMUSCULAR; INTRAVENOUS; SUBCUTANEOUS at 05:02

## 2018-11-21 RX ADMIN — Medication 250 MILLIGRAM(S): at 05:04

## 2018-11-21 RX ADMIN — Medication 450 MILLIGRAM(S): at 05:03

## 2018-11-21 RX ADMIN — Medication 325 MILLIGRAM(S): at 15:01

## 2018-11-21 RX ADMIN — Medication 300 MILLIGRAM(S): at 05:03

## 2018-11-21 RX ADMIN — Medication 325 MILLIGRAM(S): at 05:03

## 2018-11-21 NOTE — PROGRESS NOTE ADULT - SUBJECTIVE AND OBJECTIVE BOX
NEPHROLOGY INTERVAL HPI/OVERNIGHT EVENTS:    Examined earlier    MEDICATIONS  (STANDING):  amLODIPine   Tablet 10 milliGRAM(s) Oral daily  atorvastatin 20 milliGRAM(s) Oral at bedtime  clindamycin   Capsule 450 milliGRAM(s) Oral every 8 hours  dextrose 5%. 1000 milliLiter(s) (50 mL/Hr) IV Continuous <Continuous>  dextrose 50% Injectable 12.5 Gram(s) IV Push once  ferrous    sulfate 325 milliGRAM(s) Oral three times a day  hydrALAZINE 50 milliGRAM(s) Oral three times a day  insulin lispro (HumaLOG) corrective regimen sliding scale   SubCutaneous Before meals and at bedtime  labetalol 300 milliGRAM(s) Oral two times a day  melatonin 3 milliGRAM(s) Oral once  multivitamin 1 Tablet(s) Oral daily  rivaroxaban 15 milliGRAM(s) Oral every 24 hours  saccharomyces boulardii 250 milliGRAM(s) Oral two times a day  sodium bicarbonate 650 milliGRAM(s) Oral two times a day  sodium chloride 0.9% lock flush 3 milliLiter(s) IV Push every 8 hours  tamsulosin 0.4 milliGRAM(s) Oral at bedtime    MEDICATIONS  (PRN):  acetaminophen   Tablet .. 650 milliGRAM(s) Oral every 6 hours PRN Temp greater or equal to 38C (100.4F), Mild Pain (1 - 3)  dextrose 40% Gel 15 Gram(s) Oral once PRN Blood Glucose LESS THAN 70 milliGRAM(s)/deciliter  glucagon  Injectable 1 milliGRAM(s) IntraMuscular once PRN Glucose LESS THAN 70 milligrams/deciliter  ondansetron Injectable 4 milliGRAM(s) IV Push every 6 hours PRN Nausea      Allergies    penicillin (Fever)    Intolerances        Vital Signs Last 24 Hrs  T(C): 36.5 (21 Nov 2018 08:16), Max: 38.3 (21 Nov 2018 00:14)  T(F): 97.7 (21 Nov 2018 08:16), Max: 101 (21 Nov 2018 00:14)  HR: 62 (21 Nov 2018 08:16) (62 - 76)  BP: 117/64 (21 Nov 2018 08:16) (117/64 - 155/84)  BP(mean): --  RR: 20 (21 Nov 2018 08:16) (20 - 20)  SpO2: 96% (21 Nov 2018 00:14) (93% - 96%)  Daily     Daily     PHYSICAL EXAM:  GENERAL: appears chronically ill, morbid obesity  HEAD:  NCAT  EYES: EOMI  NECK: Supple, neck  veins full  NERVOUS SYSTEM:  Alert & Oriented X3  CHEST/LUNG: Clear to percussion bilaterally  HEART: Regular rate and rhythm; No murmurs  ABDOMEN: Soft, Nontender, Nondistended; Bowel sounds present  EXTREMITIES:  2+ edema B/L LE erythematous L>>R      LABS:                        9.4    11.0  )-----------( 139      ( 20 Nov 2018 04:04 )             29.7     11-21    143  |  109<H>  |  59.0<H>  ----------------------------<  132<H>  4.6   |  21.0<L>  |  2.83<H>    Ca    9.0      21 Nov 2018 08:05  Mg     2.3     11-21          Magnesium, Serum: 2.3 mg/dL (11-21 @ 08:05)          RADIOLOGY & ADDITIONAL TESTS:

## 2018-11-21 NOTE — PROGRESS NOTE ADULT - ASSESSMENT
GODWIN on CKD cr improving suspect at new baseline  Suspect progression of underlying dz HTN/ DM     Suspect will need HD in next 6mo to 1 yr discussed this with pt   He follows w Dr Reyez in our office need f/u 2 weeks   Will need AVF creation once infectious process resolved    Avoid NSAID nephrotoxic agents   24 hr urine study- 1200mg proteinuria  cellulitis L LE on abx    HTN BP controlled on current regime    Will follow GODWIN on CKD cr improving suspect at new baseline  Suspect progression of underlying dz HTN/ DM     Suspect will need HD in next 6mo to 1 yr discussed this with pt   He follows w Dr Reyez in our office need f/u 2 weeks   Will need AVF creation once infectious process resolved    Avoid NSAID nephrotoxic agents   24 hr urine study- 1200mg proteinuria- started on losartan for proteinuria  cellulitis L LE on abx    HTN BP controlled on current regime    Will follow

## 2018-11-23 LAB
CULTURE RESULTS: SIGNIFICANT CHANGE UP
CULTURE RESULTS: SIGNIFICANT CHANGE UP
SPECIMEN SOURCE: SIGNIFICANT CHANGE UP
SPECIMEN SOURCE: SIGNIFICANT CHANGE UP

## 2019-01-01 ENCOUNTER — APPOINTMENT (OUTPATIENT)
Dept: ORTHOPEDIC SURGERY | Facility: CLINIC | Age: 59
End: 2019-01-01

## 2019-06-25 PROBLEM — I73.9 PERIPHERAL VASCULAR DISEASE, UNSPECIFIED: Chronic | Status: ACTIVE | Noted: 2018-11-18

## 2019-06-25 PROBLEM — E11.9 TYPE 2 DIABETES MELLITUS WITHOUT COMPLICATIONS: Chronic | Status: ACTIVE | Noted: 2018-11-18

## 2019-06-25 PROBLEM — E78.5 HYPERLIPIDEMIA, UNSPECIFIED: Chronic | Status: ACTIVE | Noted: 2018-11-18

## 2019-06-25 PROBLEM — I26.99 OTHER PULMONARY EMBOLISM WITHOUT ACUTE COR PULMONALE: Chronic | Status: ACTIVE | Noted: 2018-11-18

## 2019-06-25 PROBLEM — E66.9 OBESITY, UNSPECIFIED: Chronic | Status: ACTIVE | Noted: 2018-11-18

## 2019-06-25 PROBLEM — N18.3 CHRONIC KIDNEY DISEASE, STAGE 3 (MODERATE): Chronic | Status: ACTIVE | Noted: 2018-11-18

## 2019-06-25 PROBLEM — I10 ESSENTIAL (PRIMARY) HYPERTENSION: Chronic | Status: ACTIVE | Noted: 2018-11-18

## 2019-06-25 PROBLEM — I82.409 ACUTE EMBOLISM AND THROMBOSIS OF UNSPECIFIED DEEP VEINS OF UNSPECIFIED LOWER EXTREMITY: Chronic | Status: ACTIVE | Noted: 2018-11-18

## 2019-11-07 NOTE — ED ADULT NURSE NOTE - FALL HARM RISK CONCLUSION
Fall with Harm Risk Drysol Pregnancy And Lactation Text: This medication is considered safe during pregnancy and breast feeding.

## 2020-01-01 ENCOUNTER — INPATIENT (INPATIENT)
Facility: HOSPITAL | Age: 60
LOS: 6 days | DRG: 870 | End: 2020-04-06
Attending: PSYCHIATRY & NEUROLOGY
Payer: COMMERCIAL

## 2020-01-01 VITALS
OXYGEN SATURATION: 73 % | DIASTOLIC BLOOD PRESSURE: 70 MMHG | SYSTOLIC BLOOD PRESSURE: 129 MMHG | WEIGHT: 315 LBS | TEMPERATURE: 99 F | RESPIRATION RATE: 22 BRPM | HEART RATE: 73 BPM | HEIGHT: 71 IN

## 2020-01-01 VITALS — OXYGEN SATURATION: 75 % | RESPIRATION RATE: 34 BRPM | HEART RATE: 112 BPM

## 2020-01-01 DIAGNOSIS — I10 ESSENTIAL (PRIMARY) HYPERTENSION: ICD-10-CM

## 2020-01-01 DIAGNOSIS — E11.9 TYPE 2 DIABETES MELLITUS WITHOUT COMPLICATIONS: ICD-10-CM

## 2020-01-01 DIAGNOSIS — J12.89 OTHER VIRAL PNEUMONIA: ICD-10-CM

## 2020-01-01 DIAGNOSIS — E78.5 HYPERLIPIDEMIA, UNSPECIFIED: ICD-10-CM

## 2020-01-01 DIAGNOSIS — I82.409 ACUTE EMBOLISM AND THROMBOSIS OF UNSPECIFIED DEEP VEINS OF UNSPECIFIED LOWER EXTREMITY: ICD-10-CM

## 2020-01-01 DIAGNOSIS — N18.3 CHRONIC KIDNEY DISEASE, STAGE 3 (MODERATE): ICD-10-CM

## 2020-01-01 LAB
ALBUMIN SERPL ELPH-MCNC: 2.7 G/DL — LOW (ref 3.3–5.2)
ALBUMIN SERPL ELPH-MCNC: 2.9 G/DL — LOW (ref 3.3–5.2)
ALBUMIN SERPL ELPH-MCNC: 3 G/DL — LOW (ref 3.3–5.2)
ALBUMIN SERPL ELPH-MCNC: 3 G/DL — LOW (ref 3.3–5.2)
ALBUMIN SERPL ELPH-MCNC: 3.1 G/DL — LOW (ref 3.3–5.2)
ALBUMIN SERPL ELPH-MCNC: 3.1 G/DL — LOW (ref 3.3–5.2)
ALBUMIN SERPL ELPH-MCNC: 3.4 G/DL — SIGNIFICANT CHANGE UP (ref 3.3–5.2)
ALP SERPL-CCNC: 52 U/L — SIGNIFICANT CHANGE UP (ref 40–120)
ALP SERPL-CCNC: 53 U/L — SIGNIFICANT CHANGE UP (ref 40–120)
ALP SERPL-CCNC: 55 U/L — SIGNIFICANT CHANGE UP (ref 40–120)
ALP SERPL-CCNC: 55 U/L — SIGNIFICANT CHANGE UP (ref 40–120)
ALP SERPL-CCNC: 60 U/L — SIGNIFICANT CHANGE UP (ref 40–120)
ALP SERPL-CCNC: 60 U/L — SIGNIFICANT CHANGE UP (ref 40–120)
ALP SERPL-CCNC: 94 U/L — SIGNIFICANT CHANGE UP (ref 40–120)
ALT FLD-CCNC: 18 U/L — SIGNIFICANT CHANGE UP
ALT FLD-CCNC: 22 U/L — SIGNIFICANT CHANGE UP
ALT FLD-CCNC: 23 U/L — SIGNIFICANT CHANGE UP
ALT FLD-CCNC: 23 U/L — SIGNIFICANT CHANGE UP
ALT FLD-CCNC: 25 U/L — SIGNIFICANT CHANGE UP
ALT FLD-CCNC: 26 U/L — SIGNIFICANT CHANGE UP
ALT FLD-CCNC: 27 U/L — SIGNIFICANT CHANGE UP
ANION GAP SERPL CALC-SCNC: 15 MMOL/L — SIGNIFICANT CHANGE UP (ref 5–17)
ANION GAP SERPL CALC-SCNC: 15 MMOL/L — SIGNIFICANT CHANGE UP (ref 5–17)
ANION GAP SERPL CALC-SCNC: 17 MMOL/L — SIGNIFICANT CHANGE UP (ref 5–17)
ANION GAP SERPL CALC-SCNC: 17 MMOL/L — SIGNIFICANT CHANGE UP (ref 5–17)
ANION GAP SERPL CALC-SCNC: 18 MMOL/L — HIGH (ref 5–17)
ANION GAP SERPL CALC-SCNC: 18 MMOL/L — HIGH (ref 5–17)
ANION GAP SERPL CALC-SCNC: 20 MMOL/L — HIGH (ref 5–17)
ANION GAP SERPL CALC-SCNC: 21 MMOL/L — HIGH (ref 5–17)
ANION GAP SERPL CALC-SCNC: 23 MMOL/L — HIGH (ref 5–17)
ANISOCYTOSIS BLD QL: SIGNIFICANT CHANGE UP
ANISOCYTOSIS BLD QL: SLIGHT — SIGNIFICANT CHANGE UP
APPEARANCE UR: CLEAR — SIGNIFICANT CHANGE UP
APTT BLD: 29.6 SEC — SIGNIFICANT CHANGE UP (ref 27.5–36.3)
AST SERPL-CCNC: 25 U/L — SIGNIFICANT CHANGE UP
AST SERPL-CCNC: 32 U/L — SIGNIFICANT CHANGE UP
AST SERPL-CCNC: 41 U/L — HIGH
AST SERPL-CCNC: 45 U/L — HIGH
AST SERPL-CCNC: 63 U/L — HIGH
BASE EXCESS BLDA CALC-SCNC: -10.1 MMOL/L — LOW (ref -2–2)
BASE EXCESS BLDA CALC-SCNC: -5.4 MMOL/L — LOW (ref -2–2)
BASE EXCESS BLDA CALC-SCNC: -6 MMOL/L — LOW (ref -2–2)
BASE EXCESS BLDA CALC-SCNC: -7.2 MMOL/L — LOW (ref -2–2)
BASE EXCESS BLDA CALC-SCNC: -7.9 MMOL/L — LOW (ref -2–2)
BASO STIPL BLD QL SMEAR: PRESENT — SIGNIFICANT CHANGE UP
BASOPHILS # BLD AUTO: 0 K/UL — SIGNIFICANT CHANGE UP (ref 0–0.2)
BASOPHILS # BLD AUTO: 0 K/UL — SIGNIFICANT CHANGE UP (ref 0–0.2)
BASOPHILS # BLD AUTO: 0.01 K/UL — SIGNIFICANT CHANGE UP (ref 0–0.2)
BASOPHILS # BLD AUTO: 0.03 K/UL — SIGNIFICANT CHANGE UP (ref 0–0.2)
BASOPHILS # BLD AUTO: 0.12 K/UL — SIGNIFICANT CHANGE UP (ref 0–0.2)
BASOPHILS NFR BLD AUTO: 0 % — SIGNIFICANT CHANGE UP (ref 0–2)
BASOPHILS NFR BLD AUTO: 0 % — SIGNIFICANT CHANGE UP (ref 0–2)
BASOPHILS NFR BLD AUTO: 0.1 % — SIGNIFICANT CHANGE UP (ref 0–2)
BASOPHILS NFR BLD AUTO: 0.2 % — SIGNIFICANT CHANGE UP (ref 0–2)
BASOPHILS NFR BLD AUTO: 0.8 % — SIGNIFICANT CHANGE UP (ref 0–2)
BILIRUB SERPL-MCNC: 0.3 MG/DL — LOW (ref 0.4–2)
BILIRUB SERPL-MCNC: 0.4 MG/DL — SIGNIFICANT CHANGE UP (ref 0.4–2)
BILIRUB SERPL-MCNC: 0.5 MG/DL — SIGNIFICANT CHANGE UP (ref 0.4–2)
BILIRUB SERPL-MCNC: 0.5 MG/DL — SIGNIFICANT CHANGE UP (ref 0.4–2)
BILIRUB SERPL-MCNC: 0.7 MG/DL — SIGNIFICANT CHANGE UP (ref 0.4–2)
BILIRUB SERPL-MCNC: 0.8 MG/DL — SIGNIFICANT CHANGE UP (ref 0.4–2)
BILIRUB SERPL-MCNC: 0.8 MG/DL — SIGNIFICANT CHANGE UP (ref 0.4–2)
BILIRUB UR-MCNC: NEGATIVE — SIGNIFICANT CHANGE UP
BLOOD GAS COMMENTS ARTERIAL: SIGNIFICANT CHANGE UP
BUN SERPL-MCNC: 100 MG/DL — HIGH (ref 8–20)
BUN SERPL-MCNC: 103 MG/DL — HIGH (ref 8–20)
BUN SERPL-MCNC: 123 MG/DL — HIGH (ref 8–20)
BUN SERPL-MCNC: 134 MG/DL — HIGH (ref 8–20)
BUN SERPL-MCNC: 64 MG/DL — HIGH (ref 8–20)
BUN SERPL-MCNC: 73 MG/DL — HIGH (ref 8–20)
BUN SERPL-MCNC: 87 MG/DL — HIGH (ref 8–20)
BUN SERPL-MCNC: 87 MG/DL — HIGH (ref 8–20)
BUN SERPL-MCNC: 98 MG/DL — HIGH (ref 8–20)
BURR CELLS BLD QL SMEAR: PRESENT — SIGNIFICANT CHANGE UP
CALCIUM SERPL-MCNC: 7.6 MG/DL — LOW (ref 8.6–10.2)
CALCIUM SERPL-MCNC: 7.7 MG/DL — LOW (ref 8.6–10.2)
CALCIUM SERPL-MCNC: 7.9 MG/DL — LOW (ref 8.6–10.2)
CALCIUM SERPL-MCNC: 7.9 MG/DL — LOW (ref 8.6–10.2)
CALCIUM SERPL-MCNC: 8 MG/DL — LOW (ref 8.6–10.2)
CALCIUM SERPL-MCNC: 8 MG/DL — LOW (ref 8.6–10.2)
CALCIUM SERPL-MCNC: 8.2 MG/DL — LOW (ref 8.6–10.2)
CALCIUM SERPL-MCNC: 8.3 MG/DL — LOW (ref 8.6–10.2)
CALCIUM SERPL-MCNC: 8.7 MG/DL — SIGNIFICANT CHANGE UP (ref 8.6–10.2)
CHLORIDE SERPL-SCNC: 100 MMOL/L — SIGNIFICANT CHANGE UP (ref 98–107)
CHLORIDE SERPL-SCNC: 102 MMOL/L — SIGNIFICANT CHANGE UP (ref 98–107)
CHLORIDE SERPL-SCNC: 104 MMOL/L — SIGNIFICANT CHANGE UP (ref 98–107)
CHLORIDE SERPL-SCNC: 105 MMOL/L — SIGNIFICANT CHANGE UP (ref 98–107)
CHLORIDE SERPL-SCNC: 108 MMOL/L — HIGH (ref 98–107)
CHLORIDE SERPL-SCNC: 93 MMOL/L — LOW (ref 98–107)
CHLORIDE SERPL-SCNC: 94 MMOL/L — LOW (ref 98–107)
CHLORIDE SERPL-SCNC: 96 MMOL/L — LOW (ref 98–107)
CHLORIDE SERPL-SCNC: 96 MMOL/L — LOW (ref 98–107)
CO2 SERPL-SCNC: 17 MMOL/L — LOW (ref 22–29)
CO2 SERPL-SCNC: 18 MMOL/L — LOW (ref 22–29)
CO2 SERPL-SCNC: 19 MMOL/L — LOW (ref 22–29)
CO2 SERPL-SCNC: 19 MMOL/L — LOW (ref 22–29)
CO2 SERPL-SCNC: 20 MMOL/L — LOW (ref 22–29)
CO2 SERPL-SCNC: 21 MMOL/L — LOW (ref 22–29)
CO2 SERPL-SCNC: 21 MMOL/L — LOW (ref 22–29)
CO2 SERPL-SCNC: 22 MMOL/L — SIGNIFICANT CHANGE UP (ref 22–29)
CO2 SERPL-SCNC: 23 MMOL/L — SIGNIFICANT CHANGE UP (ref 22–29)
COLOR SPEC: YELLOW — SIGNIFICANT CHANGE UP
CREAT SERPL-MCNC: 3.53 MG/DL — HIGH (ref 0.5–1.3)
CREAT SERPL-MCNC: 3.96 MG/DL — HIGH (ref 0.5–1.3)
CREAT SERPL-MCNC: 4.88 MG/DL — HIGH (ref 0.5–1.3)
CREAT SERPL-MCNC: 4.91 MG/DL — HIGH (ref 0.5–1.3)
CREAT SERPL-MCNC: 5.21 MG/DL — HIGH (ref 0.5–1.3)
CREAT SERPL-MCNC: 5.37 MG/DL — HIGH (ref 0.5–1.3)
CREAT SERPL-MCNC: 5.46 MG/DL — HIGH (ref 0.5–1.3)
CREAT SERPL-MCNC: 7.43 MG/DL — HIGH (ref 0.5–1.3)
CREAT SERPL-MCNC: 7.61 MG/DL — HIGH (ref 0.5–1.3)
CRP SERPL-MCNC: 2.02 MG/DL — HIGH (ref 0–0.4)
CRP SERPL-MCNC: 5.92 MG/DL — HIGH (ref 0–0.4)
CRP SERPL-MCNC: 7.17 MG/DL — HIGH (ref 0–0.4)
CRP SERPL-MCNC: 9.95 MG/DL — HIGH (ref 0–0.4)
CULTURE RESULTS: NO GROWTH — SIGNIFICANT CHANGE UP
CULTURE RESULTS: SIGNIFICANT CHANGE UP
CULTURE RESULTS: SIGNIFICANT CHANGE UP
D DIMER BLD IA.RAPID-MCNC: 691 NG/ML DDU — HIGH
DIFF PNL FLD: ABNORMAL
ELLIPTOCYTES BLD QL SMEAR: SLIGHT — SIGNIFICANT CHANGE UP
ELLIPTOCYTES BLD QL SMEAR: SLIGHT — SIGNIFICANT CHANGE UP
EOSINOPHIL # BLD AUTO: 0 K/UL — SIGNIFICANT CHANGE UP (ref 0–0.5)
EOSINOPHIL NFR BLD AUTO: 0 % — SIGNIFICANT CHANGE UP (ref 0–6)
EPI CELLS # UR: SIGNIFICANT CHANGE UP
ERYTHROCYTE [SEDIMENTATION RATE] IN BLOOD: 41 MM/HR — HIGH (ref 0–20)
FERRITIN SERPL-MCNC: 1183 NG/ML — HIGH (ref 30–400)
FERRITIN SERPL-MCNC: 611 NG/ML — HIGH (ref 30–400)
FERRITIN SERPL-MCNC: 998 NG/ML — HIGH (ref 30–400)
FLU A RESULT: SIGNIFICANT CHANGE UP
FLU A RESULT: SIGNIFICANT CHANGE UP
FLUAV AG NPH QL: SIGNIFICANT CHANGE UP
FLUBV AG NPH QL: SIGNIFICANT CHANGE UP
GAS PNL BLDA: SIGNIFICANT CHANGE UP
GLUCOSE BLDC GLUCOMTR-MCNC: 200 MG/DL — HIGH (ref 70–99)
GLUCOSE BLDC GLUCOMTR-MCNC: 200 MG/DL — HIGH (ref 70–99)
GLUCOSE BLDC GLUCOMTR-MCNC: 206 MG/DL — HIGH (ref 70–99)
GLUCOSE BLDC GLUCOMTR-MCNC: 210 MG/DL — HIGH (ref 70–99)
GLUCOSE BLDC GLUCOMTR-MCNC: 210 MG/DL — HIGH (ref 70–99)
GLUCOSE BLDC GLUCOMTR-MCNC: 214 MG/DL — HIGH (ref 70–99)
GLUCOSE BLDC GLUCOMTR-MCNC: 216 MG/DL — HIGH (ref 70–99)
GLUCOSE BLDC GLUCOMTR-MCNC: 222 MG/DL — HIGH (ref 70–99)
GLUCOSE BLDC GLUCOMTR-MCNC: 234 MG/DL — HIGH (ref 70–99)
GLUCOSE BLDC GLUCOMTR-MCNC: 243 MG/DL — HIGH (ref 70–99)
GLUCOSE BLDC GLUCOMTR-MCNC: 260 MG/DL — HIGH (ref 70–99)
GLUCOSE BLDC GLUCOMTR-MCNC: 275 MG/DL — HIGH (ref 70–99)
GLUCOSE SERPL-MCNC: 108 MG/DL — HIGH (ref 70–99)
GLUCOSE SERPL-MCNC: 134 MG/DL — HIGH (ref 70–99)
GLUCOSE SERPL-MCNC: 186 MG/DL — HIGH (ref 70–99)
GLUCOSE SERPL-MCNC: 202 MG/DL — HIGH (ref 70–99)
GLUCOSE SERPL-MCNC: 209 MG/DL — HIGH (ref 70–99)
GLUCOSE SERPL-MCNC: 233 MG/DL — HIGH (ref 70–99)
GLUCOSE SERPL-MCNC: 238 MG/DL — HIGH (ref 70–99)
GLUCOSE SERPL-MCNC: 266 MG/DL — HIGH (ref 70–99)
GLUCOSE SERPL-MCNC: 266 MG/DL — HIGH (ref 70–99)
GLUCOSE UR QL: NEGATIVE MG/DL — SIGNIFICANT CHANGE UP
HBA1C BLD-MCNC: 6 % — HIGH (ref 4–5.6)
HBV CORE AB SER-ACNC: SIGNIFICANT CHANGE UP
HBV CORE IGM SER-ACNC: SIGNIFICANT CHANGE UP
HBV SURFACE AB SER-ACNC: <3 MIU/ML — LOW
HBV SURFACE AG SER-ACNC: SIGNIFICANT CHANGE UP
HCO3 BLDA-SCNC: 16 MMOL/L — LOW (ref 20–26)
HCO3 BLDA-SCNC: 18 MMOL/L — LOW (ref 20–26)
HCO3 BLDA-SCNC: 18 MMOL/L — LOW (ref 20–26)
HCO3 BLDA-SCNC: 19 MMOL/L — LOW (ref 20–26)
HCO3 BLDA-SCNC: 19 MMOL/L — LOW (ref 20–26)
HCO3 BLDA-SCNC: 20 MMOL/L — SIGNIFICANT CHANGE UP (ref 20–26)
HCO3 BLDA-SCNC: 21 MMOL/L — SIGNIFICANT CHANGE UP (ref 20–26)
HCO3 BLDA-SCNC: 21 MMOL/L — SIGNIFICANT CHANGE UP (ref 20–26)
HCT VFR BLD CALC: 29.6 % — LOW (ref 39–50)
HCT VFR BLD CALC: 31.5 % — LOW (ref 39–50)
HCT VFR BLD CALC: 31.9 % — LOW (ref 39–50)
HCT VFR BLD CALC: 32.4 % — LOW (ref 39–50)
HCT VFR BLD CALC: 32.9 % — LOW (ref 39–50)
HCT VFR BLD CALC: 35.6 % — LOW (ref 39–50)
HCT VFR BLD CALC: 36.6 % — LOW (ref 39–50)
HCT VFR BLD CALC: 37.8 % — LOW (ref 39–50)
HCV AB S/CO SERPL IA: 0.09 S/CO — SIGNIFICANT CHANGE UP (ref 0–0.99)
HCV AB SERPL-IMP: SIGNIFICANT CHANGE UP
HGB BLD-MCNC: 10 G/DL — LOW (ref 13–17)
HGB BLD-MCNC: 10.2 G/DL — LOW (ref 13–17)
HGB BLD-MCNC: 10.4 G/DL — LOW (ref 13–17)
HGB BLD-MCNC: 10.5 G/DL — LOW (ref 13–17)
HGB BLD-MCNC: 11.3 G/DL — LOW (ref 13–17)
HGB BLD-MCNC: 11.9 G/DL — LOW (ref 13–17)
HGB BLD-MCNC: 12 G/DL — LOW (ref 13–17)
HGB BLD-MCNC: 9.8 G/DL — LOW (ref 13–17)
HOROWITZ INDEX BLDA+IHG-RTO: 100 — SIGNIFICANT CHANGE UP
HOROWITZ INDEX BLDA+IHG-RTO: 70 — SIGNIFICANT CHANGE UP
HOROWITZ INDEX BLDA+IHG-RTO: SIGNIFICANT CHANGE UP
IMM GRANULOCYTES NFR BLD AUTO: 0.9 % — SIGNIFICANT CHANGE UP (ref 0–1.5)
IMM GRANULOCYTES NFR BLD AUTO: 1.7 % — HIGH (ref 0–1.5)
IMM GRANULOCYTES NFR BLD AUTO: 2.6 % — HIGH (ref 0–1.5)
IMM GRANULOCYTES NFR BLD AUTO: 4.4 % — HIGH (ref 0–1.5)
IMM GRANULOCYTES NFR BLD AUTO: 5.5 % — HIGH (ref 0–1.5)
INR BLD: 1.35 RATIO — HIGH (ref 0.88–1.16)
KETONES UR-MCNC: NEGATIVE — SIGNIFICANT CHANGE UP
LDH SERPL L TO P-CCNC: 475 U/L — HIGH (ref 98–192)
LDH SERPL L TO P-CCNC: 553 U/L — HIGH (ref 98–192)
LEUKOCYTE ESTERASE UR-ACNC: NEGATIVE — SIGNIFICANT CHANGE UP
LYMPHOCYTES # BLD AUTO: 0 % — LOW (ref 13–44)
LYMPHOCYTES # BLD AUTO: 0 K/UL — LOW (ref 1–3.3)
LYMPHOCYTES # BLD AUTO: 0.13 K/UL — LOW (ref 1–3.3)
LYMPHOCYTES # BLD AUTO: 0.39 K/UL — LOW (ref 1–3.3)
LYMPHOCYTES # BLD AUTO: 0.46 K/UL — LOW (ref 1–3.3)
LYMPHOCYTES # BLD AUTO: 0.52 K/UL — LOW (ref 1–3.3)
LYMPHOCYTES # BLD AUTO: 0.53 K/UL — LOW (ref 1–3.3)
LYMPHOCYTES # BLD AUTO: 0.53 K/UL — LOW (ref 1–3.3)
LYMPHOCYTES # BLD AUTO: 0.68 K/UL — LOW (ref 1–3.3)
LYMPHOCYTES # BLD AUTO: 0.9 % — LOW (ref 13–44)
LYMPHOCYTES # BLD AUTO: 0.9 % — LOW (ref 13–44)
LYMPHOCYTES # BLD AUTO: 2.8 % — LOW (ref 13–44)
LYMPHOCYTES # BLD AUTO: 3.7 % — LOW (ref 13–44)
LYMPHOCYTES # BLD AUTO: 3.8 % — LOW (ref 13–44)
LYMPHOCYTES # BLD AUTO: 4.6 % — LOW (ref 13–44)
LYMPHOCYTES # BLD AUTO: 6.4 % — LOW (ref 13–44)
MACROCYTES BLD QL: SLIGHT — SIGNIFICANT CHANGE UP
MAGNESIUM SERPL-MCNC: 2.3 MG/DL — SIGNIFICANT CHANGE UP (ref 1.6–2.6)
MAGNESIUM SERPL-MCNC: 2.5 MG/DL — SIGNIFICANT CHANGE UP (ref 1.8–2.6)
MAGNESIUM SERPL-MCNC: 2.7 MG/DL — HIGH (ref 1.6–2.6)
MAGNESIUM SERPL-MCNC: 2.7 MG/DL — HIGH (ref 1.6–2.6)
MAGNESIUM SERPL-MCNC: 3.1 MG/DL — HIGH (ref 1.6–2.6)
MANUAL SMEAR VERIFICATION: SIGNIFICANT CHANGE UP
MANUAL SMEAR VERIFICATION: SIGNIFICANT CHANGE UP
MCHC RBC-ENTMCNC: 26.5 PG — LOW (ref 27–34)
MCHC RBC-ENTMCNC: 26.9 PG — LOW (ref 27–34)
MCHC RBC-ENTMCNC: 27 PG — SIGNIFICANT CHANGE UP (ref 27–34)
MCHC RBC-ENTMCNC: 27.2 PG — SIGNIFICANT CHANGE UP (ref 27–34)
MCHC RBC-ENTMCNC: 27.3 PG — SIGNIFICANT CHANGE UP (ref 27–34)
MCHC RBC-ENTMCNC: 27.4 PG — SIGNIFICANT CHANGE UP (ref 27–34)
MCHC RBC-ENTMCNC: 27.7 PG — SIGNIFICANT CHANGE UP (ref 27–34)
MCHC RBC-ENTMCNC: 27.7 PG — SIGNIFICANT CHANGE UP (ref 27–34)
MCHC RBC-ENTMCNC: 30.9 GM/DL — LOW (ref 32–36)
MCHC RBC-ENTMCNC: 31.7 GM/DL — LOW (ref 32–36)
MCHC RBC-ENTMCNC: 31.7 GM/DL — LOW (ref 32–36)
MCHC RBC-ENTMCNC: 31.9 GM/DL — LOW (ref 32–36)
MCHC RBC-ENTMCNC: 32.4 GM/DL — SIGNIFICANT CHANGE UP (ref 32–36)
MCHC RBC-ENTMCNC: 32.5 GM/DL — SIGNIFICANT CHANGE UP (ref 32–36)
MCHC RBC-ENTMCNC: 32.6 GM/DL — SIGNIFICANT CHANGE UP (ref 32–36)
MCHC RBC-ENTMCNC: 33.1 GM/DL — SIGNIFICANT CHANGE UP (ref 32–36)
MCV RBC AUTO: 82.7 FL — SIGNIFICANT CHANGE UP (ref 80–100)
MCV RBC AUTO: 83.1 FL — SIGNIFICANT CHANGE UP (ref 80–100)
MCV RBC AUTO: 83.8 FL — SIGNIFICANT CHANGE UP (ref 80–100)
MCV RBC AUTO: 85.1 FL — SIGNIFICANT CHANGE UP (ref 80–100)
MCV RBC AUTO: 85.2 FL — SIGNIFICANT CHANGE UP (ref 80–100)
MCV RBC AUTO: 85.5 FL — SIGNIFICANT CHANGE UP (ref 80–100)
MCV RBC AUTO: 85.9 FL — SIGNIFICANT CHANGE UP (ref 80–100)
MCV RBC AUTO: 87.3 FL — SIGNIFICANT CHANGE UP (ref 80–100)
METAMYELOCYTES # FLD: 0.9 % — HIGH (ref 0–0)
MICROCYTES BLD QL: SLIGHT — SIGNIFICANT CHANGE UP
MICROCYTES BLD QL: SLIGHT — SIGNIFICANT CHANGE UP
MONOCYTES # BLD AUTO: 0.13 K/UL — SIGNIFICANT CHANGE UP (ref 0–0.9)
MONOCYTES # BLD AUTO: 0.32 K/UL — SIGNIFICANT CHANGE UP (ref 0–0.9)
MONOCYTES # BLD AUTO: 0.38 K/UL — SIGNIFICANT CHANGE UP (ref 0–0.9)
MONOCYTES # BLD AUTO: 0.49 K/UL — SIGNIFICANT CHANGE UP (ref 0–0.9)
MONOCYTES # BLD AUTO: 0.53 K/UL — SIGNIFICANT CHANGE UP (ref 0–0.9)
MONOCYTES # BLD AUTO: 1.01 K/UL — HIGH (ref 0–0.9)
MONOCYTES # BLD AUTO: 1.39 K/UL — HIGH (ref 0–0.9)
MONOCYTES # BLD AUTO: 1.88 K/UL — HIGH (ref 0–0.9)
MONOCYTES NFR BLD AUTO: 0.9 % — LOW (ref 2–14)
MONOCYTES NFR BLD AUTO: 2.6 % — SIGNIFICANT CHANGE UP (ref 2–14)
MONOCYTES NFR BLD AUTO: 2.8 % — SIGNIFICANT CHANGE UP (ref 2–14)
MONOCYTES NFR BLD AUTO: 3 % — SIGNIFICANT CHANGE UP (ref 2–14)
MONOCYTES NFR BLD AUTO: 3.5 % — SIGNIFICANT CHANGE UP (ref 2–14)
MONOCYTES NFR BLD AUTO: 4.3 % — SIGNIFICANT CHANGE UP (ref 2–14)
MONOCYTES NFR BLD AUTO: 5 % — SIGNIFICANT CHANGE UP (ref 2–14)
MONOCYTES NFR BLD AUTO: 5.4 % — SIGNIFICANT CHANGE UP (ref 2–14)
MYELOCYTES NFR BLD: 0.9 % — HIGH (ref 0–0)
NEUTROPHILS # BLD AUTO: 10.33 K/UL — HIGH (ref 1.8–7.4)
NEUTROPHILS # BLD AUTO: 11.35 K/UL — HIGH (ref 1.8–7.4)
NEUTROPHILS # BLD AUTO: 12.41 K/UL — HIGH (ref 1.8–7.4)
NEUTROPHILS # BLD AUTO: 14.09 K/UL — HIGH (ref 1.8–7.4)
NEUTROPHILS # BLD AUTO: 16.05 K/UL — HIGH (ref 1.8–7.4)
NEUTROPHILS # BLD AUTO: 41.44 K/UL — HIGH (ref 1.8–7.4)
NEUTROPHILS # BLD AUTO: 50.22 K/UL — HIGH (ref 1.8–7.4)
NEUTROPHILS # BLD AUTO: 9.27 K/UL — HIGH (ref 1.8–7.4)
NEUTROPHILS NFR BLD AUTO: 80.9 % — HIGH (ref 43–77)
NEUTROPHILS NFR BLD AUTO: 84.3 % — HIGH (ref 43–77)
NEUTROPHILS NFR BLD AUTO: 86.1 % — HIGH (ref 43–77)
NEUTROPHILS NFR BLD AUTO: 86.8 % — HIGH (ref 43–77)
NEUTROPHILS NFR BLD AUTO: 88.2 % — HIGH (ref 43–77)
NEUTROPHILS NFR BLD AUTO: 90.6 % — HIGH (ref 43–77)
NEUTROPHILS NFR BLD AUTO: 91.6 % — HIGH (ref 43–77)
NEUTROPHILS NFR BLD AUTO: 93 % — HIGH (ref 43–77)
NEUTS BAND # BLD: 13.9 % — HIGH (ref 0–8)
NEUTS BAND # BLD: 9.5 % — HIGH (ref 0–8)
NITRITE UR-MCNC: NEGATIVE — SIGNIFICANT CHANGE UP
NRBC # BLD: 1 /100 — HIGH (ref 0–0)
OVALOCYTES BLD QL SMEAR: SLIGHT — SIGNIFICANT CHANGE UP
OVALOCYTES BLD QL SMEAR: SLIGHT — SIGNIFICANT CHANGE UP
PCO2 BLDA: 107 MMHG — CRITICAL HIGH (ref 35–45)
PCO2 BLDA: 35 MMHG — SIGNIFICANT CHANGE UP (ref 35–45)
PCO2 BLDA: 42 MMHG — SIGNIFICANT CHANGE UP (ref 35–45)
PCO2 BLDA: 55 MMHG — HIGH (ref 35–45)
PCO2 BLDA: 57 MMHG — HIGH (ref 35–45)
PCO2 BLDA: 61 MMHG — HIGH (ref 35–45)
PCO2 BLDA: 74 MMHG — CRITICAL HIGH (ref 35–45)
PCO2 BLDA: 82 MMHG — CRITICAL HIGH (ref 35–45)
PH BLDA: 7 — CRITICAL LOW (ref 7.35–7.45)
PH BLDA: 7.09 — CRITICAL LOW (ref 7.35–7.45)
PH BLDA: 7.13 — CRITICAL LOW (ref 7.35–7.45)
PH BLDA: 7.14 — CRITICAL LOW (ref 7.35–7.45)
PH BLDA: 7.21 — LOW (ref 7.35–7.45)
PH BLDA: 7.24 — LOW (ref 7.35–7.45)
PH BLDA: 7.25 — LOW (ref 7.35–7.45)
PH BLDA: 7.35 — SIGNIFICANT CHANGE UP (ref 7.35–7.45)
PH UR: 5 — SIGNIFICANT CHANGE UP (ref 5–8)
PHOSPHATE SERPL-MCNC: 12.1 MG/DL — HIGH (ref 2.4–4.7)
PHOSPHATE SERPL-MCNC: 3.7 MG/DL — SIGNIFICANT CHANGE UP (ref 2.4–4.7)
PHOSPHATE SERPL-MCNC: 6.7 MG/DL — HIGH (ref 2.4–4.7)
PHOSPHATE SERPL-MCNC: 8.1 MG/DL — HIGH (ref 2.4–4.7)
PHOSPHATE SERPL-MCNC: 8.9 MG/DL — HIGH (ref 2.4–4.7)
PLAT MORPH BLD: NORMAL — SIGNIFICANT CHANGE UP
PLAT MORPH BLD: NORMAL — SIGNIFICANT CHANGE UP
PLATELET # BLD AUTO: 124 K/UL — LOW (ref 150–400)
PLATELET # BLD AUTO: 137 K/UL — LOW (ref 150–400)
PLATELET # BLD AUTO: 137 K/UL — LOW (ref 150–400)
PLATELET # BLD AUTO: 140 K/UL — LOW (ref 150–400)
PLATELET # BLD AUTO: 151 K/UL — SIGNIFICANT CHANGE UP (ref 150–400)
PLATELET # BLD AUTO: 163 K/UL — SIGNIFICANT CHANGE UP (ref 150–400)
PLATELET # BLD AUTO: 240 K/UL — SIGNIFICANT CHANGE UP (ref 150–400)
PLATELET # BLD AUTO: 282 K/UL — SIGNIFICANT CHANGE UP (ref 150–400)
PO2 BLDA: 182 MMHG — HIGH (ref 83–108)
PO2 BLDA: 47 MMHG — CRITICAL LOW (ref 83–108)
PO2 BLDA: 56 MMHG — LOW (ref 83–108)
PO2 BLDA: 56 MMHG — LOW (ref 83–108)
PO2 BLDA: 57 MMHG — LOW (ref 83–108)
PO2 BLDA: 79 MMHG — LOW (ref 83–108)
PO2 BLDA: 82 MMHG — LOW (ref 83–108)
PO2 BLDA: 87 MMHG — SIGNIFICANT CHANGE UP (ref 83–108)
POIKILOCYTOSIS BLD QL AUTO: SLIGHT — SIGNIFICANT CHANGE UP
POIKILOCYTOSIS BLD QL AUTO: SLIGHT — SIGNIFICANT CHANGE UP
POLYCHROMASIA BLD QL SMEAR: SLIGHT — SIGNIFICANT CHANGE UP
POLYCHROMASIA BLD QL SMEAR: SLIGHT — SIGNIFICANT CHANGE UP
POTASSIUM SERPL-MCNC: 5.1 MMOL/L — SIGNIFICANT CHANGE UP (ref 3.5–5.3)
POTASSIUM SERPL-MCNC: 5.4 MMOL/L — HIGH (ref 3.5–5.3)
POTASSIUM SERPL-MCNC: 5.5 MMOL/L — HIGH (ref 3.5–5.3)
POTASSIUM SERPL-MCNC: 5.9 MMOL/L — HIGH (ref 3.5–5.3)
POTASSIUM SERPL-MCNC: 6.2 MMOL/L — CRITICAL HIGH (ref 3.5–5.3)
POTASSIUM SERPL-MCNC: 6.2 MMOL/L — CRITICAL HIGH (ref 3.5–5.3)
POTASSIUM SERPL-MCNC: 6.6 MMOL/L — CRITICAL HIGH (ref 3.5–5.3)
POTASSIUM SERPL-MCNC: 6.9 MMOL/L — CRITICAL HIGH (ref 3.5–5.3)
POTASSIUM SERPL-MCNC: 7.4 MMOL/L — CRITICAL HIGH (ref 3.5–5.3)
POTASSIUM SERPL-SCNC: 5.1 MMOL/L — SIGNIFICANT CHANGE UP (ref 3.5–5.3)
POTASSIUM SERPL-SCNC: 5.4 MMOL/L — HIGH (ref 3.5–5.3)
POTASSIUM SERPL-SCNC: 5.5 MMOL/L — HIGH (ref 3.5–5.3)
POTASSIUM SERPL-SCNC: 5.9 MMOL/L — HIGH (ref 3.5–5.3)
POTASSIUM SERPL-SCNC: 6.2 MMOL/L — CRITICAL HIGH (ref 3.5–5.3)
POTASSIUM SERPL-SCNC: 6.2 MMOL/L — CRITICAL HIGH (ref 3.5–5.3)
POTASSIUM SERPL-SCNC: 6.6 MMOL/L — CRITICAL HIGH (ref 3.5–5.3)
POTASSIUM SERPL-SCNC: 6.9 MMOL/L — CRITICAL HIGH (ref 3.5–5.3)
POTASSIUM SERPL-SCNC: 7.4 MMOL/L — CRITICAL HIGH (ref 3.5–5.3)
PROCALCITONIN SERPL-MCNC: 0.34 NG/ML — HIGH (ref 0.02–0.1)
PROCALCITONIN SERPL-MCNC: 0.58 NG/ML — HIGH (ref 0.02–0.1)
PROCALCITONIN SERPL-MCNC: 0.6 NG/ML — HIGH (ref 0.02–0.1)
PROMYELOCYTES # FLD: 0.9 % — HIGH (ref 0–0)
PROT SERPL-MCNC: 5.1 G/DL — LOW (ref 6.6–8.7)
PROT SERPL-MCNC: 5.6 G/DL — LOW (ref 6.6–8.7)
PROT SERPL-MCNC: 5.7 G/DL — LOW (ref 6.6–8.7)
PROT SERPL-MCNC: 5.7 G/DL — LOW (ref 6.6–8.7)
PROT SERPL-MCNC: 5.8 G/DL — LOW (ref 6.6–8.7)
PROT SERPL-MCNC: 6.4 G/DL — LOW (ref 6.6–8.7)
PROT SERPL-MCNC: 6.4 G/DL — LOW (ref 6.6–8.7)
PROT UR-MCNC: 100 MG/DL
PROTHROM AB SERPL-ACNC: 15.4 SEC — HIGH (ref 10–12.9)
RBC # BLD: 3.58 M/UL — LOW (ref 4.2–5.8)
RBC # BLD: 3.75 M/UL — LOW (ref 4.2–5.8)
RBC # BLD: 3.77 M/UL — LOW (ref 4.2–5.8)
RBC # BLD: 3.79 M/UL — LOW (ref 4.2–5.8)
RBC # BLD: 3.85 M/UL — LOW (ref 4.2–5.8)
RBC # BLD: 4.18 M/UL — LOW (ref 4.2–5.8)
RBC # BLD: 4.33 M/UL — SIGNIFICANT CHANGE UP (ref 4.2–5.8)
RBC # BLD: 4.37 M/UL — SIGNIFICANT CHANGE UP (ref 4.2–5.8)
RBC # FLD: 15.7 % — HIGH (ref 10.3–14.5)
RBC # FLD: 15.9 % — HIGH (ref 10.3–14.5)
RBC # FLD: 16 % — HIGH (ref 10.3–14.5)
RBC # FLD: 16.1 % — HIGH (ref 10.3–14.5)
RBC # FLD: 16.2 % — HIGH (ref 10.3–14.5)
RBC # FLD: 16.6 % — HIGH (ref 10.3–14.5)
RBC # FLD: 16.6 % — HIGH (ref 10.3–14.5)
RBC # FLD: 17.1 % — HIGH (ref 10.3–14.5)
RBC BLD AUTO: ABNORMAL
RBC BLD AUTO: ABNORMAL
RBC CASTS # UR COMP ASSIST: SIGNIFICANT CHANGE UP /HPF (ref 0–4)
RSV RESULT: SIGNIFICANT CHANGE UP
RSV RNA RESP QL NAA+PROBE: SIGNIFICANT CHANGE UP
SAO2 % BLDA: 100 % — HIGH (ref 95–99)
SAO2 % BLDA: 81 % — LOW (ref 95–99)
SAO2 % BLDA: 82 % — LOW (ref 95–99)
SAO2 % BLDA: 84 % — LOW (ref 95–99)
SAO2 % BLDA: 88 % — LOW (ref 95–99)
SAO2 % BLDA: 93 % — LOW (ref 95–99)
SAO2 % BLDA: 95 % — SIGNIFICANT CHANGE UP (ref 95–99)
SAO2 % BLDA: 97 % — SIGNIFICANT CHANGE UP (ref 95–99)
SARS-COV-2 RNA SPEC QL NAA+PROBE: DETECTED
SCHISTOCYTES BLD QL AUTO: SLIGHT — SIGNIFICANT CHANGE UP
SODIUM SERPL-SCNC: 135 MMOL/L — SIGNIFICANT CHANGE UP (ref 135–145)
SODIUM SERPL-SCNC: 137 MMOL/L — SIGNIFICANT CHANGE UP (ref 135–145)
SODIUM SERPL-SCNC: 138 MMOL/L — SIGNIFICANT CHANGE UP (ref 135–145)
SODIUM SERPL-SCNC: 140 MMOL/L — SIGNIFICANT CHANGE UP (ref 135–145)
SODIUM SERPL-SCNC: 142 MMOL/L — SIGNIFICANT CHANGE UP (ref 135–145)
SP GR SPEC: 1.02 — SIGNIFICANT CHANGE UP (ref 1.01–1.02)
SPECIMEN SOURCE: SIGNIFICANT CHANGE UP
TROPONIN T SERPL-MCNC: 0.08 NG/ML — HIGH (ref 0–0.06)
UROBILINOGEN FLD QL: 1 MG/DL
VARIANT LYMPHS # BLD: 0.9 % — SIGNIFICANT CHANGE UP (ref 0–6)
WBC # BLD: 10.67 K/UL — HIGH (ref 3.8–10.5)
WBC # BLD: 11.28 K/UL — HIGH (ref 3.8–10.5)
WBC # BLD: 12.52 K/UL — HIGH (ref 3.8–10.5)
WBC # BLD: 14.06 K/UL — HIGH (ref 3.8–10.5)
WBC # BLD: 14.47 K/UL — HIGH (ref 3.8–10.5)
WBC # BLD: 18.64 K/UL — HIGH (ref 3.8–10.5)
WBC # BLD: 43.71 K/UL — CRITICAL HIGH (ref 3.8–10.5)
WBC # BLD: 53.54 K/UL — CRITICAL HIGH (ref 3.8–10.5)
WBC # FLD AUTO: 10.67 K/UL — HIGH (ref 3.8–10.5)
WBC # FLD AUTO: 11.28 K/UL — HIGH (ref 3.8–10.5)
WBC # FLD AUTO: 12.52 K/UL — HIGH (ref 3.8–10.5)
WBC # FLD AUTO: 14.06 K/UL — HIGH (ref 3.8–10.5)
WBC # FLD AUTO: 14.47 K/UL — HIGH (ref 3.8–10.5)
WBC # FLD AUTO: 18.64 K/UL — HIGH (ref 3.8–10.5)
WBC # FLD AUTO: 43.71 K/UL — CRITICAL HIGH (ref 3.8–10.5)
WBC # FLD AUTO: 53.54 K/UL — CRITICAL HIGH (ref 3.8–10.5)
WBC UR QL: NEGATIVE — SIGNIFICANT CHANGE UP

## 2020-01-01 PROCEDURE — 71045 X-RAY EXAM CHEST 1 VIEW: CPT | Mod: 26

## 2020-01-01 PROCEDURE — 84145 PROCALCITONIN (PCT): CPT

## 2020-01-01 PROCEDURE — 99232 SBSQ HOSP IP/OBS MODERATE 35: CPT

## 2020-01-01 PROCEDURE — 94003 VENT MGMT INPAT SUBQ DAY: CPT

## 2020-01-01 PROCEDURE — 86705 HEP B CORE ANTIBODY IGM: CPT

## 2020-01-01 PROCEDURE — 94002 VENT MGMT INPAT INIT DAY: CPT

## 2020-01-01 PROCEDURE — 99291 CRITICAL CARE FIRST HOUR: CPT

## 2020-01-01 PROCEDURE — 99223 1ST HOSP IP/OBS HIGH 75: CPT

## 2020-01-01 PROCEDURE — 84484 ASSAY OF TROPONIN QUANT: CPT

## 2020-01-01 PROCEDURE — 83615 LACTATE (LD) (LDH) ENZYME: CPT

## 2020-01-01 PROCEDURE — 87040 BLOOD CULTURE FOR BACTERIA: CPT

## 2020-01-01 PROCEDURE — 87635 SARS-COV-2 COVID-19 AMP PRB: CPT

## 2020-01-01 PROCEDURE — 85027 COMPLETE CBC AUTOMATED: CPT

## 2020-01-01 PROCEDURE — 86140 C-REACTIVE PROTEIN: CPT

## 2020-01-01 PROCEDURE — 93306 TTE W/DOPPLER COMPLETE: CPT

## 2020-01-01 PROCEDURE — 93010 ELECTROCARDIOGRAM REPORT: CPT

## 2020-01-01 PROCEDURE — 81001 URINALYSIS AUTO W/SCOPE: CPT

## 2020-01-01 PROCEDURE — 86706 HEP B SURFACE ANTIBODY: CPT

## 2020-01-01 PROCEDURE — 99261: CPT

## 2020-01-01 PROCEDURE — 86803 HEPATITIS C AB TEST: CPT

## 2020-01-01 PROCEDURE — 85610 PROTHROMBIN TIME: CPT

## 2020-01-01 PROCEDURE — 93306 TTE W/DOPPLER COMPLETE: CPT | Mod: 26

## 2020-01-01 PROCEDURE — 83605 ASSAY OF LACTIC ACID: CPT

## 2020-01-01 PROCEDURE — 86704 HEP B CORE ANTIBODY TOTAL: CPT

## 2020-01-01 PROCEDURE — 87340 HEPATITIS B SURFACE AG IA: CPT

## 2020-01-01 PROCEDURE — 36415 COLL VENOUS BLD VENIPUNCTURE: CPT

## 2020-01-01 PROCEDURE — 71045 X-RAY EXAM CHEST 1 VIEW: CPT

## 2020-01-01 PROCEDURE — 83735 ASSAY OF MAGNESIUM: CPT

## 2020-01-01 PROCEDURE — 82728 ASSAY OF FERRITIN: CPT

## 2020-01-01 PROCEDURE — 84100 ASSAY OF PHOSPHORUS: CPT

## 2020-01-01 PROCEDURE — 83520 IMMUNOASSAY QUANT NOS NONAB: CPT

## 2020-01-01 PROCEDURE — 80053 COMPREHEN METABOLIC PANEL: CPT

## 2020-01-01 PROCEDURE — 85014 HEMATOCRIT: CPT

## 2020-01-01 PROCEDURE — 82947 ASSAY GLUCOSE BLOOD QUANT: CPT

## 2020-01-01 PROCEDURE — 84295 ASSAY OF SERUM SODIUM: CPT

## 2020-01-01 PROCEDURE — 85730 THROMBOPLASTIN TIME PARTIAL: CPT

## 2020-01-01 PROCEDURE — 94640 AIRWAY INHALATION TREATMENT: CPT

## 2020-01-01 PROCEDURE — 85379 FIBRIN DEGRADATION QUANT: CPT

## 2020-01-01 PROCEDURE — 80048 BASIC METABOLIC PNL TOTAL CA: CPT

## 2020-01-01 PROCEDURE — 82803 BLOOD GASES ANY COMBINATION: CPT

## 2020-01-01 PROCEDURE — 71045 X-RAY EXAM CHEST 1 VIEW: CPT | Mod: 26,77

## 2020-01-01 PROCEDURE — 36600 WITHDRAWAL OF ARTERIAL BLOOD: CPT

## 2020-01-01 PROCEDURE — 99233 SBSQ HOSP IP/OBS HIGH 50: CPT

## 2020-01-01 PROCEDURE — 83036 HEMOGLOBIN GLYCOSYLATED A1C: CPT

## 2020-01-01 PROCEDURE — 99285 EMERGENCY DEPT VISIT HI MDM: CPT | Mod: 25

## 2020-01-01 PROCEDURE — 96365 THER/PROPH/DIAG IV INF INIT: CPT

## 2020-01-01 PROCEDURE — 82962 GLUCOSE BLOOD TEST: CPT

## 2020-01-01 PROCEDURE — 82435 ASSAY OF BLOOD CHLORIDE: CPT

## 2020-01-01 PROCEDURE — 82330 ASSAY OF CALCIUM: CPT

## 2020-01-01 PROCEDURE — 36556 INSERT NON-TUNNEL CV CATH: CPT

## 2020-01-01 PROCEDURE — 87086 URINE CULTURE/COLONY COUNT: CPT

## 2020-01-01 PROCEDURE — 94760 N-INVAS EAR/PLS OXIMETRY 1: CPT

## 2020-01-01 PROCEDURE — 93005 ELECTROCARDIOGRAM TRACING: CPT

## 2020-01-01 PROCEDURE — 87631 RESP VIRUS 3-5 TARGETS: CPT

## 2020-01-01 PROCEDURE — 84132 ASSAY OF SERUM POTASSIUM: CPT

## 2020-01-01 PROCEDURE — 76937 US GUIDE VASCULAR ACCESS: CPT | Mod: 26

## 2020-01-01 PROCEDURE — 85652 RBC SED RATE AUTOMATED: CPT

## 2020-01-01 RX ORDER — ROCURONIUM BROMIDE 10 MG/ML
8 VIAL (ML) INTRAVENOUS
Qty: 500 | Refills: 0 | Status: DISCONTINUED | OUTPATIENT
Start: 2020-01-01 | End: 2020-01-01

## 2020-01-01 RX ORDER — ROCURONIUM BROMIDE 10 MG/ML
50 VIAL (ML) INTRAVENOUS ONCE
Refills: 0 | Status: COMPLETED | OUTPATIENT
Start: 2020-01-01 | End: 2020-01-01

## 2020-01-01 RX ORDER — CALCIUM GLUCONATE 100 MG/ML
2 VIAL (ML) INTRAVENOUS ONCE
Refills: 0 | Status: COMPLETED | OUTPATIENT
Start: 2020-01-01 | End: 2020-01-01

## 2020-01-01 RX ORDER — FUROSEMIDE 40 MG
40 TABLET ORAL ONCE
Refills: 0 | Status: DISCONTINUED | OUTPATIENT
Start: 2020-01-01 | End: 2020-01-01

## 2020-01-01 RX ORDER — SODIUM CHLORIDE 9 MG/ML
1000 INJECTION, SOLUTION INTRAVENOUS
Refills: 0 | Status: DISCONTINUED | OUTPATIENT
Start: 2020-01-01 | End: 2020-01-01

## 2020-01-01 RX ORDER — APIXABAN 2.5 MG/1
2.5 TABLET, FILM COATED ORAL
Refills: 0 | Status: DISCONTINUED | OUTPATIENT
Start: 2020-01-01 | End: 2020-01-01

## 2020-01-01 RX ORDER — PANTOPRAZOLE SODIUM 20 MG/1
40 TABLET, DELAYED RELEASE ORAL DAILY
Refills: 0 | Status: DISCONTINUED | OUTPATIENT
Start: 2020-01-01 | End: 2020-01-01

## 2020-01-01 RX ORDER — HYDROXYCHLOROQUINE SULFATE 200 MG
200 TABLET ORAL EVERY 12 HOURS
Refills: 0 | Status: COMPLETED | OUTPATIENT
Start: 2020-01-01 | End: 2020-01-01

## 2020-01-01 RX ORDER — FUROSEMIDE 40 MG
80 TABLET ORAL ONCE
Refills: 0 | Status: COMPLETED | OUTPATIENT
Start: 2020-01-01 | End: 2020-01-01

## 2020-01-01 RX ORDER — AZITHROMYCIN 500 MG/1
500 TABLET, FILM COATED ORAL ONCE
Refills: 0 | Status: COMPLETED | OUTPATIENT
Start: 2020-01-01 | End: 2020-01-01

## 2020-01-01 RX ORDER — DEXTROSE 50 % IN WATER 50 %
50 SYRINGE (ML) INTRAVENOUS ONCE
Refills: 0 | Status: COMPLETED | OUTPATIENT
Start: 2020-01-01 | End: 2020-01-01

## 2020-01-01 RX ORDER — FENTANYL CITRATE 50 UG/ML
100 INJECTION INTRAVENOUS
Refills: 0 | Status: DISCONTINUED | OUTPATIENT
Start: 2020-01-01 | End: 2020-01-01

## 2020-01-01 RX ORDER — ACETAMINOPHEN 500 MG
650 TABLET ORAL ONCE
Refills: 0 | Status: COMPLETED | OUTPATIENT
Start: 2020-01-01 | End: 2020-01-01

## 2020-01-01 RX ORDER — GLUCAGON INJECTION, SOLUTION 0.5 MG/.1ML
1 INJECTION, SOLUTION SUBCUTANEOUS ONCE
Refills: 0 | Status: DISCONTINUED | OUTPATIENT
Start: 2020-01-01 | End: 2020-01-01

## 2020-01-01 RX ORDER — CHLORHEXIDINE GLUCONATE 213 G/1000ML
15 SOLUTION TOPICAL EVERY 12 HOURS
Refills: 0 | Status: DISCONTINUED | OUTPATIENT
Start: 2020-01-01 | End: 2020-01-01

## 2020-01-01 RX ORDER — SEVELAMER CARBONATE 2400 MG/1
1500 POWDER, FOR SUSPENSION ORAL EVERY 8 HOURS
Refills: 0 | Status: DISCONTINUED | OUTPATIENT
Start: 2020-01-01 | End: 2020-01-01

## 2020-01-01 RX ORDER — APIXABAN 2.5 MG/1
5 TABLET, FILM COATED ORAL EVERY 12 HOURS
Refills: 0 | Status: DISCONTINUED | OUTPATIENT
Start: 2020-01-01 | End: 2020-01-01

## 2020-01-01 RX ORDER — SEVELAMER CARBONATE 2400 MG/1
800 POWDER, FOR SUSPENSION ORAL THREE TIMES A DAY
Refills: 0 | Status: DISCONTINUED | OUTPATIENT
Start: 2020-01-01 | End: 2020-01-01

## 2020-01-01 RX ORDER — SODIUM CHLORIDE 9 MG/ML
500 INJECTION INTRAMUSCULAR; INTRAVENOUS; SUBCUTANEOUS ONCE
Refills: 0 | Status: COMPLETED | OUTPATIENT
Start: 2020-01-01 | End: 2020-01-01

## 2020-01-01 RX ORDER — SODIUM BICARBONATE 1 MEQ/ML
0.07 SYRINGE (ML) INTRAVENOUS
Qty: 150 | Refills: 0 | Status: DISCONTINUED | OUTPATIENT
Start: 2020-01-01 | End: 2020-01-01

## 2020-01-01 RX ORDER — DEXTROSE 50 % IN WATER 50 %
25 SYRINGE (ML) INTRAVENOUS ONCE
Refills: 0 | Status: DISCONTINUED | OUTPATIENT
Start: 2020-01-01 | End: 2020-01-01

## 2020-01-01 RX ORDER — NOREPINEPHRINE BITARTRATE/D5W 8 MG/250ML
0.05 PLASTIC BAG, INJECTION (ML) INTRAVENOUS
Qty: 8 | Refills: 0 | Status: DISCONTINUED | OUTPATIENT
Start: 2020-01-01 | End: 2020-01-01

## 2020-01-01 RX ORDER — MIDAZOLAM HYDROCHLORIDE 1 MG/ML
0.02 INJECTION, SOLUTION INTRAMUSCULAR; INTRAVENOUS
Qty: 100 | Refills: 0 | Status: DISCONTINUED | OUTPATIENT
Start: 2020-01-01 | End: 2020-01-01

## 2020-01-01 RX ORDER — HYDROXYCHLOROQUINE SULFATE 200 MG
400 TABLET ORAL EVERY 12 HOURS
Refills: 0 | Status: COMPLETED | OUTPATIENT
Start: 2020-01-01 | End: 2020-01-01

## 2020-01-01 RX ORDER — DEXTROSE 50 % IN WATER 50 %
25 SYRINGE (ML) INTRAVENOUS ONCE
Refills: 0 | Status: COMPLETED | OUTPATIENT
Start: 2020-01-01 | End: 2020-01-01

## 2020-01-01 RX ORDER — RIVAROXABAN 15 MG-20MG
15 KIT ORAL
Refills: 0 | Status: DISCONTINUED | OUTPATIENT
Start: 2020-01-01 | End: 2020-01-01

## 2020-01-01 RX ORDER — CALCIUM GLUCONATE 100 MG/ML
3 VIAL (ML) INTRAVENOUS ONCE
Refills: 0 | Status: DISCONTINUED | OUTPATIENT
Start: 2020-01-01 | End: 2020-01-01

## 2020-01-01 RX ORDER — INSULIN HUMAN 100 [IU]/ML
10 INJECTION, SOLUTION SUBCUTANEOUS ONCE
Refills: 0 | Status: COMPLETED | OUTPATIENT
Start: 2020-01-01 | End: 2020-01-01

## 2020-01-01 RX ORDER — FENTANYL CITRATE 50 UG/ML
0.5 INJECTION INTRAVENOUS
Qty: 2500 | Refills: 0 | Status: DISCONTINUED | OUTPATIENT
Start: 2020-01-01 | End: 2020-01-01

## 2020-01-01 RX ORDER — CHLORHEXIDINE GLUCONATE 213 G/1000ML
1 SOLUTION TOPICAL
Refills: 0 | Status: DISCONTINUED | OUTPATIENT
Start: 2020-01-01 | End: 2020-01-01

## 2020-01-01 RX ORDER — HYDROXYCHLOROQUINE SULFATE 200 MG
TABLET ORAL
Refills: 0 | Status: DISCONTINUED | OUTPATIENT
Start: 2020-01-01 | End: 2020-01-01

## 2020-01-01 RX ORDER — PROPOFOL 10 MG/ML
10 INJECTION, EMULSION INTRAVENOUS
Qty: 1000 | Refills: 0 | Status: DISCONTINUED | OUTPATIENT
Start: 2020-01-01 | End: 2020-01-01

## 2020-01-01 RX ORDER — TAMSULOSIN HYDROCHLORIDE 0.4 MG/1
0.4 CAPSULE ORAL AT BEDTIME
Refills: 0 | Status: DISCONTINUED | OUTPATIENT
Start: 2020-01-01 | End: 2020-01-01

## 2020-01-01 RX ORDER — DEXTROSE 50 % IN WATER 50 %
15 SYRINGE (ML) INTRAVENOUS ONCE
Refills: 0 | Status: DISCONTINUED | OUTPATIENT
Start: 2020-01-01 | End: 2020-01-01

## 2020-01-01 RX ORDER — INSULIN LISPRO 100/ML
VIAL (ML) SUBCUTANEOUS
Refills: 0 | Status: DISCONTINUED | OUTPATIENT
Start: 2020-01-01 | End: 2020-01-01

## 2020-01-01 RX ORDER — DEXTROSE 50 % IN WATER 50 %
12.5 SYRINGE (ML) INTRAVENOUS ONCE
Refills: 0 | Status: DISCONTINUED | OUTPATIENT
Start: 2020-01-01 | End: 2020-01-01

## 2020-01-01 RX ORDER — ASCORBIC ACID 60 MG
1500 TABLET,CHEWABLE ORAL EVERY 6 HOURS
Refills: 0 | Status: DISCONTINUED | OUTPATIENT
Start: 2020-01-01 | End: 2020-01-01

## 2020-01-01 RX ORDER — CISATRACURIUM BESYLATE 2 MG/ML
3 INJECTION INTRAVENOUS
Qty: 200 | Refills: 0 | Status: DISCONTINUED | OUTPATIENT
Start: 2020-01-01 | End: 2020-01-01

## 2020-01-01 RX ORDER — ATORVASTATIN CALCIUM 80 MG/1
20 TABLET, FILM COATED ORAL AT BEDTIME
Refills: 0 | Status: DISCONTINUED | OUTPATIENT
Start: 2020-01-01 | End: 2020-01-01

## 2020-01-01 RX ORDER — ALBUTEROL 90 UG/1
2 AEROSOL, METERED ORAL EVERY 4 HOURS
Refills: 0 | Status: DISCONTINUED | OUTPATIENT
Start: 2020-01-01 | End: 2020-01-01

## 2020-01-01 RX ORDER — CALCIUM CHLORIDE
1000 POWDER (GRAM) MISCELLANEOUS ONCE
Refills: 0 | Status: COMPLETED | OUTPATIENT
Start: 2020-01-01 | End: 2020-01-01

## 2020-01-01 RX ORDER — HYDROMORPHONE HYDROCHLORIDE 2 MG/ML
0.5 INJECTION INTRAMUSCULAR; INTRAVENOUS; SUBCUTANEOUS
Refills: 0 | Status: DISCONTINUED | OUTPATIENT
Start: 2020-01-01 | End: 2020-01-01

## 2020-01-01 RX ORDER — ALBUTEROL 90 UG/1
8 AEROSOL, METERED ORAL ONCE
Refills: 0 | Status: COMPLETED | OUTPATIENT
Start: 2020-01-01 | End: 2020-01-01

## 2020-01-01 RX ORDER — NOREPINEPHRINE BITARTRATE/D5W 8 MG/250ML
0.05 PLASTIC BAG, INJECTION (ML) INTRAVENOUS
Qty: 16 | Refills: 0 | Status: DISCONTINUED | OUTPATIENT
Start: 2020-01-01 | End: 2020-01-01

## 2020-01-01 RX ORDER — SODIUM CHLORIDE 9 MG/ML
10 INJECTION INTRAMUSCULAR; INTRAVENOUS; SUBCUTANEOUS
Refills: 0 | Status: DISCONTINUED | OUTPATIENT
Start: 2020-01-01 | End: 2020-01-01

## 2020-01-01 RX ORDER — AZITHROMYCIN 500 MG/1
250 TABLET, FILM COATED ORAL DAILY
Refills: 0 | Status: DISCONTINUED | OUTPATIENT
Start: 2020-01-01 | End: 2020-01-01

## 2020-01-01 RX ORDER — FENTANYL CITRATE 50 UG/ML
100 INJECTION INTRAVENOUS ONCE
Refills: 0 | Status: DISCONTINUED | OUTPATIENT
Start: 2020-01-01 | End: 2020-01-01

## 2020-01-01 RX ORDER — ACETAMINOPHEN 500 MG
650 TABLET ORAL EVERY 4 HOURS
Refills: 0 | Status: DISCONTINUED | OUTPATIENT
Start: 2020-01-01 | End: 2020-01-01

## 2020-01-01 RX ORDER — SENNA PLUS 8.6 MG/1
2 TABLET ORAL AT BEDTIME
Refills: 0 | Status: DISCONTINUED | OUTPATIENT
Start: 2020-01-01 | End: 2020-01-01

## 2020-01-01 RX ORDER — INSULIN HUMAN 100 [IU]/ML
3 INJECTION, SOLUTION SUBCUTANEOUS
Qty: 100 | Refills: 0 | Status: DISCONTINUED | OUTPATIENT
Start: 2020-01-01 | End: 2020-01-01

## 2020-01-01 RX ORDER — SEVELAMER CARBONATE 2400 MG/1
800 POWDER, FOR SUSPENSION ORAL
Refills: 0 | Status: DISCONTINUED | OUTPATIENT
Start: 2020-01-01 | End: 2020-01-01

## 2020-01-01 RX ORDER — ROCURONIUM BROMIDE 10 MG/ML
100 VIAL (ML) INTRAVENOUS ONCE
Refills: 0 | Status: COMPLETED | OUTPATIENT
Start: 2020-01-01 | End: 2020-01-01

## 2020-01-01 RX ORDER — MIDAZOLAM HYDROCHLORIDE 1 MG/ML
4 INJECTION, SOLUTION INTRAMUSCULAR; INTRAVENOUS ONCE
Refills: 0 | Status: DISCONTINUED | OUTPATIENT
Start: 2020-01-01 | End: 2020-01-01

## 2020-01-01 RX ORDER — INSULIN GLARGINE 100 [IU]/ML
10 INJECTION, SOLUTION SUBCUTANEOUS EVERY MORNING
Refills: 0 | Status: DISCONTINUED | OUTPATIENT
Start: 2020-01-01 | End: 2020-01-01

## 2020-01-01 RX ORDER — POLYETHYLENE GLYCOL 3350 17 G/17G
17 POWDER, FOR SOLUTION ORAL AT BEDTIME
Refills: 0 | Status: DISCONTINUED | OUTPATIENT
Start: 2020-01-01 | End: 2020-01-01

## 2020-01-01 RX ORDER — THIAMINE MONONITRATE (VIT B1) 100 MG
200 TABLET ORAL
Refills: 0 | Status: DISCONTINUED | OUTPATIENT
Start: 2020-01-01 | End: 2020-01-01

## 2020-01-01 RX ORDER — INSULIN LISPRO 100/ML
VIAL (ML) SUBCUTANEOUS EVERY 6 HOURS
Refills: 0 | Status: DISCONTINUED | OUTPATIENT
Start: 2020-01-01 | End: 2020-01-01

## 2020-01-01 RX ORDER — INSULIN GLARGINE 100 [IU]/ML
15 INJECTION, SOLUTION SUBCUTANEOUS AT BEDTIME
Refills: 0 | Status: DISCONTINUED | OUTPATIENT
Start: 2020-01-01 | End: 2020-01-01

## 2020-01-01 RX ORDER — HYDROXYCHLOROQUINE SULFATE 200 MG
200 TABLET ORAL EVERY 12 HOURS
Refills: 0 | Status: DISCONTINUED | OUTPATIENT
Start: 2020-01-01 | End: 2020-01-01

## 2020-01-01 RX ADMIN — Medication 1000 MILLIGRAM(S): at 18:58

## 2020-01-01 RX ADMIN — SENNA PLUS 2 TABLET(S): 8.6 TABLET ORAL at 23:23

## 2020-01-01 RX ADMIN — CHLORHEXIDINE GLUCONATE 15 MILLILITER(S): 213 SOLUTION TOPICAL at 06:12

## 2020-01-01 RX ADMIN — Medication 25 MILLILITER(S): at 07:19

## 2020-01-01 RX ADMIN — PROPOFOL 10.9 MICROGRAM(S)/KG/MIN: 10 INJECTION, EMULSION INTRAVENOUS at 01:28

## 2020-01-01 RX ADMIN — FENTANYL CITRATE 100 MICROGRAM(S): 50 INJECTION INTRAVENOUS at 14:00

## 2020-01-01 RX ADMIN — CHLORHEXIDINE GLUCONATE 1 APPLICATION(S): 213 SOLUTION TOPICAL at 07:18

## 2020-01-01 RX ADMIN — CHLORHEXIDINE GLUCONATE 15 MILLILITER(S): 213 SOLUTION TOPICAL at 06:08

## 2020-01-01 RX ADMIN — Medication 4: at 07:03

## 2020-01-01 RX ADMIN — Medication 153 MILLIGRAM(S): at 15:56

## 2020-01-01 RX ADMIN — Medication 200 MILLIGRAM(S): at 23:00

## 2020-01-01 RX ADMIN — CHLORHEXIDINE GLUCONATE 15 MILLILITER(S): 213 SOLUTION TOPICAL at 18:14

## 2020-01-01 RX ADMIN — PROPOFOL 10.9 MICROGRAM(S)/KG/MIN: 10 INJECTION, EMULSION INTRAVENOUS at 22:40

## 2020-01-01 RX ADMIN — CHLORHEXIDINE GLUCONATE 15 MILLILITER(S): 213 SOLUTION TOPICAL at 06:45

## 2020-01-01 RX ADMIN — Medication 200 MILLIGRAM(S): at 22:56

## 2020-01-01 RX ADMIN — PANTOPRAZOLE SODIUM 40 MILLIGRAM(S): 20 TABLET, DELAYED RELEASE ORAL at 14:46

## 2020-01-01 RX ADMIN — PROPOFOL 10.9 MICROGRAM(S)/KG/MIN: 10 INJECTION, EMULSION INTRAVENOUS at 04:00

## 2020-01-01 RX ADMIN — Medication 200 MILLIGRAM(S): at 06:08

## 2020-01-01 RX ADMIN — Medication 4: at 05:42

## 2020-01-01 RX ADMIN — Medication 200 MILLIGRAM(S): at 07:03

## 2020-01-01 RX ADMIN — INSULIN GLARGINE 10 UNIT(S): 100 INJECTION, SOLUTION SUBCUTANEOUS at 12:50

## 2020-01-01 RX ADMIN — Medication 6: at 11:27

## 2020-01-01 RX ADMIN — CHLORHEXIDINE GLUCONATE 15 MILLILITER(S): 213 SOLUTION TOPICAL at 04:44

## 2020-01-01 RX ADMIN — RIVAROXABAN 15 MILLIGRAM(S): KIT at 17:15

## 2020-01-01 RX ADMIN — CHLORHEXIDINE GLUCONATE 15 MILLILITER(S): 213 SOLUTION TOPICAL at 17:10

## 2020-01-01 RX ADMIN — Medication 200 MILLIGRAM(S): at 13:16

## 2020-01-01 RX ADMIN — Medication 125 MILLIGRAM(S): at 17:15

## 2020-01-01 RX ADMIN — Medication 4: at 17:15

## 2020-01-01 RX ADMIN — Medication 100 MILLIGRAM(S): at 09:08

## 2020-01-01 RX ADMIN — Medication 6: at 17:52

## 2020-01-01 RX ADMIN — ATORVASTATIN CALCIUM 20 MILLIGRAM(S): 80 TABLET, FILM COATED ORAL at 22:40

## 2020-01-01 RX ADMIN — Medication 200 GRAM(S): at 07:05

## 2020-01-01 RX ADMIN — Medication 200 MILLIGRAM(S): at 09:31

## 2020-01-01 RX ADMIN — POLYETHYLENE GLYCOL 3350 17 GRAM(S): 17 POWDER, FOR SOLUTION ORAL at 21:40

## 2020-01-01 RX ADMIN — PANTOPRAZOLE SODIUM 40 MILLIGRAM(S): 20 TABLET, DELAYED RELEASE ORAL at 09:54

## 2020-01-01 RX ADMIN — SEVELAMER CARBONATE 800 MILLIGRAM(S): 2400 POWDER, FOR SUSPENSION ORAL at 17:10

## 2020-01-01 RX ADMIN — CHLORHEXIDINE GLUCONATE 1 APPLICATION(S): 213 SOLUTION TOPICAL at 06:39

## 2020-01-01 RX ADMIN — Medication 6: at 18:29

## 2020-01-01 RX ADMIN — POLYETHYLENE GLYCOL 3350 17 GRAM(S): 17 POWDER, FOR SOLUTION ORAL at 22:40

## 2020-01-01 RX ADMIN — Medication 125 MILLIGRAM(S): at 18:26

## 2020-01-01 RX ADMIN — PANTOPRAZOLE SODIUM 40 MILLIGRAM(S): 20 TABLET, DELAYED RELEASE ORAL at 23:01

## 2020-01-01 RX ADMIN — PANTOPRAZOLE SODIUM 40 MILLIGRAM(S): 20 TABLET, DELAYED RELEASE ORAL at 13:16

## 2020-01-01 RX ADMIN — Medication 650 MILLIGRAM(S): at 09:03

## 2020-01-01 RX ADMIN — SODIUM CHLORIDE 100 MILLILITER(S): 9 INJECTION, SOLUTION INTRAVENOUS at 02:00

## 2020-01-01 RX ADMIN — ATORVASTATIN CALCIUM 20 MILLIGRAM(S): 80 TABLET, FILM COATED ORAL at 22:58

## 2020-01-01 RX ADMIN — Medication 400 MILLIGRAM(S): at 00:48

## 2020-01-01 RX ADMIN — CHLORHEXIDINE GLUCONATE 1 APPLICATION(S): 213 SOLUTION TOPICAL at 05:42

## 2020-01-01 RX ADMIN — Medication 125 MILLIGRAM(S): at 21:16

## 2020-01-01 RX ADMIN — INSULIN HUMAN 10 UNIT(S): 100 INJECTION, SOLUTION SUBCUTANEOUS at 06:38

## 2020-01-01 RX ADMIN — Medication 4: at 01:53

## 2020-01-01 RX ADMIN — CHLORHEXIDINE GLUCONATE 15 MILLILITER(S): 213 SOLUTION TOPICAL at 17:15

## 2020-01-01 RX ADMIN — POLYETHYLENE GLYCOL 3350 17 GRAM(S): 17 POWDER, FOR SOLUTION ORAL at 23:24

## 2020-01-01 RX ADMIN — Medication 125 MILLIGRAM(S): at 06:13

## 2020-01-01 RX ADMIN — CHLORHEXIDINE GLUCONATE 1 APPLICATION(S): 213 SOLUTION TOPICAL at 07:02

## 2020-01-01 RX ADMIN — SENNA PLUS 2 TABLET(S): 8.6 TABLET ORAL at 21:16

## 2020-01-01 RX ADMIN — INSULIN GLARGINE 10 UNIT(S): 100 INJECTION, SOLUTION SUBCUTANEOUS at 09:01

## 2020-01-01 RX ADMIN — AZITHROMYCIN 255 MILLIGRAM(S): 500 TABLET, FILM COATED ORAL at 15:16

## 2020-01-01 RX ADMIN — Medication 80 MILLIGRAM(S): at 04:11

## 2020-01-01 RX ADMIN — PROPOFOL 10.9 MICROGRAM(S)/KG/MIN: 10 INJECTION, EMULSION INTRAVENOUS at 02:10

## 2020-01-01 RX ADMIN — FENTANYL CITRATE 100 MICROGRAM(S): 50 INJECTION INTRAVENOUS at 14:06

## 2020-01-01 RX ADMIN — Medication 25 MILLILITER(S): at 01:28

## 2020-01-01 RX ADMIN — PROPOFOL 10.9 MICROGRAM(S)/KG/MIN: 10 INJECTION, EMULSION INTRAVENOUS at 21:00

## 2020-01-01 RX ADMIN — AZITHROMYCIN 500 MILLIGRAM(S): 500 TABLET, FILM COATED ORAL at 16:20

## 2020-01-01 RX ADMIN — POLYETHYLENE GLYCOL 3350 17 GRAM(S): 17 POWDER, FOR SOLUTION ORAL at 21:16

## 2020-01-01 RX ADMIN — CHLORHEXIDINE GLUCONATE 15 MILLILITER(S): 213 SOLUTION TOPICAL at 05:42

## 2020-01-01 RX ADMIN — Medication 200 MILLIGRAM(S): at 12:46

## 2020-01-01 RX ADMIN — SODIUM CHLORIDE 500 MILLILITER(S): 9 INJECTION INTRAMUSCULAR; INTRAVENOUS; SUBCUTANEOUS at 15:16

## 2020-01-01 RX ADMIN — Medication 400 MILLIGRAM(S): at 11:06

## 2020-01-01 RX ADMIN — SEVELAMER CARBONATE 800 MILLIGRAM(S): 2400 POWDER, FOR SUSPENSION ORAL at 09:02

## 2020-01-01 RX ADMIN — PANTOPRAZOLE SODIUM 40 MILLIGRAM(S): 20 TABLET, DELAYED RELEASE ORAL at 10:41

## 2020-01-01 RX ADMIN — CHLORHEXIDINE GLUCONATE 15 MILLILITER(S): 213 SOLUTION TOPICAL at 21:16

## 2020-01-01 RX ADMIN — SODIUM CHLORIDE 500 MILLILITER(S): 9 INJECTION INTRAMUSCULAR; INTRAVENOUS; SUBCUTANEOUS at 16:15

## 2020-01-01 RX ADMIN — Medication 650 MILLIGRAM(S): at 15:00

## 2020-01-01 RX ADMIN — APIXABAN 5 MILLIGRAM(S): 2.5 TABLET, FILM COATED ORAL at 18:29

## 2020-01-01 RX ADMIN — CHLORHEXIDINE GLUCONATE 15 MILLILITER(S): 213 SOLUTION TOPICAL at 15:56

## 2020-01-01 RX ADMIN — SENNA PLUS 2 TABLET(S): 8.6 TABLET ORAL at 21:40

## 2020-01-01 RX ADMIN — SODIUM CHLORIDE 100 MILLILITER(S): 9 INJECTION, SOLUTION INTRAVENOUS at 02:11

## 2020-01-01 RX ADMIN — Medication 50 MILLILITER(S): at 08:51

## 2020-01-01 RX ADMIN — Medication 200 MILLIGRAM(S): at 00:58

## 2020-01-01 RX ADMIN — INSULIN HUMAN 10 UNIT(S): 100 INJECTION, SOLUTION SUBCUTANEOUS at 06:09

## 2020-01-01 RX ADMIN — Medication 4: at 17:23

## 2020-01-01 RX ADMIN — INSULIN HUMAN 10 UNIT(S): 100 INJECTION, SOLUTION SUBCUTANEOUS at 08:51

## 2020-01-01 RX ADMIN — PANTOPRAZOLE SODIUM 40 MILLIGRAM(S): 20 TABLET, DELAYED RELEASE ORAL at 09:31

## 2020-01-01 RX ADMIN — Medication 50 MILLIGRAM(S): at 12:55

## 2020-01-01 RX ADMIN — CHLORHEXIDINE GLUCONATE 1 APPLICATION(S): 213 SOLUTION TOPICAL at 06:10

## 2020-01-01 RX ADMIN — ATORVASTATIN CALCIUM 20 MILLIGRAM(S): 80 TABLET, FILM COATED ORAL at 00:48

## 2020-01-01 RX ADMIN — APIXABAN 5 MILLIGRAM(S): 2.5 TABLET, FILM COATED ORAL at 18:27

## 2020-01-01 RX ADMIN — Medication 125 MILLIGRAM(S): at 04:43

## 2020-01-01 RX ADMIN — Medication 4: at 01:19

## 2020-01-01 RX ADMIN — Medication 125 MILLIGRAM(S): at 06:09

## 2020-01-01 RX ADMIN — Medication 125 MILLIGRAM(S): at 06:40

## 2020-01-01 RX ADMIN — PROPOFOL 10.9 MICROGRAM(S)/KG/MIN: 10 INJECTION, EMULSION INTRAVENOUS at 06:00

## 2020-01-01 RX ADMIN — PROPOFOL 10.9 MICROGRAM(S)/KG/MIN: 10 INJECTION, EMULSION INTRAVENOUS at 23:01

## 2020-01-01 RX ADMIN — TAMSULOSIN HYDROCHLORIDE 0.4 MILLIGRAM(S): 0.4 CAPSULE ORAL at 00:48

## 2020-01-01 RX ADMIN — FENTANYL CITRATE 9.05 MICROGRAM(S)/KG/HR: 50 INJECTION INTRAVENOUS at 12:33

## 2020-01-01 RX ADMIN — CHLORHEXIDINE GLUCONATE 15 MILLILITER(S): 213 SOLUTION TOPICAL at 06:39

## 2020-01-01 RX ADMIN — POLYETHYLENE GLYCOL 3350 17 GRAM(S): 17 POWDER, FOR SOLUTION ORAL at 00:50

## 2020-01-01 RX ADMIN — CHLORHEXIDINE GLUCONATE 1 APPLICATION(S): 213 SOLUTION TOPICAL at 06:45

## 2020-01-01 RX ADMIN — Medication 4: at 13:55

## 2020-01-01 RX ADMIN — Medication 200 MILLIGRAM(S): at 17:15

## 2020-01-01 RX ADMIN — FENTANYL CITRATE 100 MICROGRAM(S): 50 INJECTION INTRAVENOUS at 13:02

## 2020-01-01 RX ADMIN — HYDROMORPHONE HYDROCHLORIDE 0.5 MILLIGRAM(S): 2 INJECTION INTRAMUSCULAR; INTRAVENOUS; SUBCUTANEOUS at 12:37

## 2020-01-01 RX ADMIN — Medication 200 MILLIGRAM(S): at 21:40

## 2020-01-01 RX ADMIN — MIDAZOLAM HYDROCHLORIDE 3.62 MG/KG/HR: 1 INJECTION, SOLUTION INTRAMUSCULAR; INTRAVENOUS at 02:11

## 2020-01-01 RX ADMIN — AZITHROMYCIN 250 MILLIGRAM(S): 500 TABLET, FILM COATED ORAL at 11:06

## 2020-01-01 RX ADMIN — CHLORHEXIDINE GLUCONATE 15 MILLILITER(S): 213 SOLUTION TOPICAL at 18:24

## 2020-01-01 RX ADMIN — Medication 6: at 09:55

## 2020-01-01 RX ADMIN — APIXABAN 5 MILLIGRAM(S): 2.5 TABLET, FILM COATED ORAL at 05:42

## 2020-01-01 RX ADMIN — APIXABAN 5 MILLIGRAM(S): 2.5 TABLET, FILM COATED ORAL at 06:40

## 2020-01-01 RX ADMIN — Medication 200 MILLIGRAM(S): at 15:57

## 2020-01-01 RX ADMIN — ATORVASTATIN CALCIUM 20 MILLIGRAM(S): 80 TABLET, FILM COATED ORAL at 23:23

## 2020-01-01 RX ADMIN — APIXABAN 5 MILLIGRAM(S): 2.5 TABLET, FILM COATED ORAL at 04:44

## 2020-01-01 RX ADMIN — Medication 6: at 13:10

## 2020-01-01 RX ADMIN — INSULIN HUMAN 10 UNIT(S): 100 INJECTION, SOLUTION SUBCUTANEOUS at 01:27

## 2020-01-01 RX ADMIN — RIVAROXABAN 15 MILLIGRAM(S): KIT at 23:01

## 2020-01-01 RX ADMIN — CHLORHEXIDINE GLUCONATE 1 APPLICATION(S): 213 SOLUTION TOPICAL at 15:56

## 2020-01-01 RX ADMIN — ATORVASTATIN CALCIUM 20 MILLIGRAM(S): 80 TABLET, FILM COATED ORAL at 21:16

## 2020-01-01 RX ADMIN — CHLORHEXIDINE GLUCONATE 1 APPLICATION(S): 213 SOLUTION TOPICAL at 04:43

## 2020-01-01 RX ADMIN — INSULIN HUMAN 10 UNIT(S): 100 INJECTION, SOLUTION SUBCUTANEOUS at 13:11

## 2020-01-01 RX ADMIN — Medication 4: at 12:50

## 2020-01-01 RX ADMIN — APIXABAN 5 MILLIGRAM(S): 2.5 TABLET, FILM COATED ORAL at 21:15

## 2020-01-01 RX ADMIN — Medication 125 MILLIGRAM(S): at 16:07

## 2020-01-01 RX ADMIN — Medication 200 MILLIGRAM(S): at 10:26

## 2020-01-01 RX ADMIN — PROPOFOL 10.9 MICROGRAM(S)/KG/MIN: 10 INJECTION, EMULSION INTRAVENOUS at 06:08

## 2020-01-01 RX ADMIN — SEVELAMER CARBONATE 800 MILLIGRAM(S): 2400 POWDER, FOR SUSPENSION ORAL at 13:24

## 2020-01-01 RX ADMIN — ALBUTEROL 8 PUFF(S): 90 AEROSOL, METERED ORAL at 21:23

## 2020-01-01 RX ADMIN — SODIUM CHLORIDE 100 MILLILITER(S): 9 INJECTION, SOLUTION INTRAVENOUS at 09:32

## 2020-01-01 RX ADMIN — ATORVASTATIN CALCIUM 20 MILLIGRAM(S): 80 TABLET, FILM COATED ORAL at 21:39

## 2020-01-01 RX ADMIN — Medication 25 MILLILITER(S): at 06:10

## 2020-01-01 RX ADMIN — Medication 80 MILLIGRAM(S): at 08:46

## 2020-01-01 RX ADMIN — Medication 200 GRAM(S): at 18:15

## 2020-01-01 RX ADMIN — Medication 50 MILLILITER(S): at 12:50

## 2020-01-01 RX ADMIN — Medication 200 GRAM(S): at 06:39

## 2020-01-01 RX ADMIN — Medication 6: at 06:17

## 2020-01-01 RX ADMIN — Medication 6: at 23:24

## 2020-03-30 NOTE — H&P ADULT - NSICDXPASTMEDICALHX_GEN_ALL_CORE_FT
PAST MEDICAL HISTORY:  CKD (chronic kidney disease) stage 3, GFR 30-59 ml/min     DM (diabetes mellitus)     DVT (deep venous thrombosis)     HLD (hyperlipidemia)     HTN (hypertension)     Obesity     Pulmonary embolism     PVD (peripheral vascular disease)

## 2020-03-30 NOTE — ED ADULT TRIAGE NOTE - CHIEF COMPLAINT QUOTE
Sent form Dr. ladinskys office for low pulse ox.  Both In-laws positive COVID.  C/O shortness of breath and cough.

## 2020-03-30 NOTE — H&P ADULT - PROBLEM SELECTOR PLAN 1
Maintain on NRB for now, start Plaquenil pending results of COVID 19. Discussed possible need for intubation if he deteriorates, understands and would want to proceed if need be

## 2020-03-30 NOTE — ED ADULT NURSE NOTE - NSIMPLEMENTINTERV_GEN_ALL_ED
Implemented All Fall with Harm Risk Interventions:  [place] to call system. Call bell, personal items and telephone within reach. Instruct patient to call for assistance. Room bathroom lighting operational. Non-slip footwear when patient is off stretcher. Physically safe environment: no spills, clutter or unnecessary equipment. Stretcher in lowest position, wheels locked, appropriate side rails in place. Provide visual cue, wrist band, yellow gown, etc. Monitor gait and stability. Monitor for mental status changes and reorient to person, place, and time. Review medications for side effects contributing to fall risk. Reinforce activity limits and safety measures with patient and family. Provide visual clues: red socks.

## 2020-03-30 NOTE — ED PROVIDER NOTE - PHYSICAL EXAMINATION
General:   severe respiratory distress  Head:     NC/AT, EOMI, oral mucosa moist  Neck:     trachea midline  Lungs:   bibasilar crackles, retractions, tachypneic  CVS:     S1S2, RRR, no m/g/r  Abd:     +BS, s/nt/nd, no organomegaly  Ext:    2+ radial and pedal pulses, trace bilateral pedal edema  Neuro: AAOx3, no sensory/motor deficits

## 2020-03-30 NOTE — ED ADULT NURSE NOTE - OBJECTIVE STATEMENT
Pt care assumed at 1430, presents to ED A&Ox3 c/o shortness of breath, fatigue and chills x1 weeks. Reports his father in law recently passed away from covid 19 however did not have direct contact with him. Pt denies any other sick contacts. Pt speaking coherently, tachypneic, and hypoxic on RA. PTt placed on NRB with + improvement. Spo2 90% at 1447. Pt denies any chest pain or discomfort. IV access established, labs drawn and sent. Proper PPE maintained throughout treatment. NSR on cardiac monitor. Will continue to monitor and reassess.

## 2020-03-30 NOTE — ED PROVIDER NOTE - CLINICAL SUMMARY MEDICAL DECISION MAKING FREE TEXT BOX
"""S/P IOL OU. Finish post op drop schedule. New eyeglasses. """ patient arrived with severe hypoxia, greatly improved with pronation and NRB, now saturation 94% on NRB without tachypnea. xray with bilateral upper and lower pneumonia, will admit to stepdown.  reviewed case with icu who states patient does not need intubation or icu at this time.

## 2020-03-30 NOTE — H&P ADULT - ASSESSMENT
59 yr old male with hypertension, diabetes mellitus, hyperlipidemia, morbid obesity, NOBLE on CPAP, DVT s/p IVC filter on anticoagulation, CKD stage 3 admitted with complaints of one week of fatigue, body aches, progressively worsening shortness of breath with a cough. Noted to be severely hypoxic in ED, placed on NRB. Labs and imaging noted, COVID 19 sent for testing.

## 2020-03-30 NOTE — ED PROVIDER NOTE - OBJECTIVE STATEMENT
59yoM; with pmh signif for HTN, HLD, DM, Morbid Obesity; now p/w fever, cough, sob x1 week, progressively worsening.  c/o chest pain--sscp, tightness with breathing.  denies abd pain. denies n/v/d. +COVID contact--father-in-law with who  1 week ago.  PMH: HTN, HLD, DM  SOCIAL: No tobacco/illicit substance use/socialEtOH

## 2020-03-30 NOTE — H&P ADULT - HISTORY OF PRESENT ILLNESS
59 yr old male with hypertension, diabetes mellitus, hyperlipidemia, morbid obesity, NOBLE on CPAP, DVT s/p IVC filter on anticoagulation, CKD stage 3 admitted with complaints of one week of fatigue, body aches, progressively worsening shortness of breath with a cough. Recently lost father in law. States his wife has similar symptoms and is in the ED with similar symptoms. He is not on home oxygen. Severely hypoxic in ED. No nausea, vomiting, abdominal pain, diarrhea. Follows nephrology.

## 2020-03-30 NOTE — ED PROVIDER NOTE - NS ED ROS FT
Constitutional: (+) fever  (+)chills  (-)sweats  Eyes/ENT: (-) blurry vision, (-) epistaxis  (-)rhinorrhea   (-) sore throat    Cardiovascular: (+) chest pain, (-) palpitations (-) edema   Respiratory: (+) cough, (+) shortness of breath   Gastrointestinal: (-)nausea  (-)vomiting, (-) diarrhea  (-) abdominal pain   :  (-)dysuria, (-)frequency, (-)urgency, (-)hematuria  Musculoskeletal: (-) neck pain, (-) back pain, (-) joint pain  Integumentary: (-) rash, (-) edema  Neurological: (-) headache, (-) altered mental status  (-)LOC

## 2020-03-31 NOTE — PROGRESS NOTE ADULT - SUBJECTIVE AND OBJECTIVE BOX
INTERVAL HPI/OVERNIGHT EVENTS:    CC: acute hypoxic respiratory failure due to suspected COVID-19 infection, CKD, DVT, diabetes mellitus, hypertension      Seen initially, noted to have sat 95% on NRB, we discussed possible need for intubation if he declines further. He was subsequently noted to have pulse ox 84%. ICU was consulted, discussed need for intubation. Patient unable to self prone given umbilical hernia and morbid obesity.     Vital Signs Last 24 Hrs  T(C): 36.9 (31 Mar 2020 11:23), Max: 37.7 (31 Mar 2020 02:56)  T(F): 98.5 (31 Mar 2020 11:23), Max: 99.8 (31 Mar 2020 02:56)  HR: 88 (31 Mar 2020 14:00) (61 - 88)  BP: 102/52 (31 Mar 2020 13:03) (84/54 - 123/60)  BP(mean): --  RR: 20 (31 Mar 2020 12:34) (20 - 26)  SpO2: 96% (31 Mar 2020 14:00) (84% - 98%)    PHYSICAL EXAM:    GENERAL: on NRB. alert, coughing. mild respiratory distress  CHEST/LUNG: b/l air entry, coarse  HEART: regular  ABDOMEN: soft, bs+  EXTREMITIES:  no edema, tenderness    MEDICATIONS  (STANDING):  ascorbic acid IVPB 1500 milliGRAM(s) IV Intermittent every 6 hours  atorvastatin 20 milliGRAM(s) Oral at bedtime  azithromycin   Tablet 250 milliGRAM(s) Oral daily  chlorhexidine 0.12% Liquid 15 milliLiter(s) Oral Mucosa every 12 hours  chlorhexidine 2% Cloths 1 Application(s) Topical <User Schedule>  fentaNYL    Injectable 100 MICROGram(s) IV Push once  hydroxychloroquine 200 milliGRAM(s) Oral every 12 hours  methylPREDNISolone sodium succinate Injectable 125 milliGRAM(s) IV Push two times a day  midazolam Infusion 0.02 mG/kG/Hr (3.62 mL/Hr) IV Continuous <Continuous>  midazolam Injectable 4 milliGRAM(s) IV Push once  norepinephrine Infusion 0.05 MICROgram(s)/kG/Min (17 mL/Hr) IV Continuous <Continuous>  pantoprazole  Injectable 40 milliGRAM(s) IV Push daily  propofol Infusion 10 MICROgram(s)/kG/Min (10.9 mL/Hr) IV Continuous <Continuous>  rivaroxaban 15 milliGRAM(s) Oral with dinner  sodium chloride 0.45% 1000 milliLiter(s) (100 mL/Hr) IV Continuous <Continuous>  thiamine Injectable 200 milliGRAM(s) IV Push <User Schedule>    MEDICATIONS  (PRN):  acetaminophen   Tablet .. 650 milliGRAM(s) Oral every 4 hours PRN Temp greater or equal to 38.5C (101.3F)  ALBUTerol    90 MICROgram(s) HFA Inhaler 2 Puff(s) Inhalation every 4 hours PRN Shortness of Breath and/or Wheezing  benzonatate 100 milliGRAM(s) Oral three times a day PRN Cough  fentaNYL    Injectable 100 MICROGram(s) IV Push every 1 hour PRN Moderate Pain (4 - 6)  HYDROmorphone  Injectable 0.5 milliGRAM(s) IV Push every 2 hours PRN Moderate Pain (4 - 6)      Allergies    penicillin (Fever)    Intolerances          LABS:                          10.4   11.28 )-----------( 137      ( 31 Mar 2020 12:28 )             31.9     03-31    142  |  108<H>  |  73.0<H>  ----------------------------<  108<H>  5.1   |  19.0<L>  |  3.96<H>    Ca    8.0<L>      31 Mar 2020 12:28  Phos  3.7     03-31  Mg     2.3     03-31    TPro  5.7<L>  /  Alb  3.1<L>  /  TBili  0.5  /  DBili  x   /  AST  63<H>  /  ALT  23  /  AlkPhos  55  03-31    PT/INR - ( 30 Mar 2020 14:47 )   PT: 15.4 sec;   INR: 1.35 ratio         PTT - ( 30 Mar 2020 14:47 )  PTT:29.6 sec      RADIOLOGY & ADDITIONAL TESTS:

## 2020-03-31 NOTE — PROGRESS NOTE ADULT - ASSESSMENT
59 yr old male with hypertension, diabetes mellitus, hyperlipidemia, morbid obesity, NOBLE on CPAP, DVT s/p IVC filter on anticoagulation, CKD stage 3 admitted with complaints of one week of fatigue, body aches, progressively worsening shortness of breath with a cough. Noted to be severely hypoxic in ED, placed on NRB. His father in law was COVID positive and recently .

## 2020-03-31 NOTE — CHART NOTE - NSCHARTNOTEFT_GEN_A_CORE
59 yr old male with hypertension, diabetes mellitus, hyperlipidemia, morbid obesity, NOBLE on CPAP, DVT s/p IVC filter on anticoagulation, CKD stage 3 admitted with complaints of one week of fatigue, body aches, progressively worsening shortness of breath with a cough. Recently lost father in law. States his wife has similar symptoms and is in the ED with similar symptoms. He is not on home oxygen. Severely hypoxic in ED. No nausea, vomiting, abdominal pain, diarrhea. Follows nephrology.     3/31 pt transferred to [facility]  due to decreased pulse oxygenation in the 80's, Intubation    Pt's daughter Estela Mcadams [phone] informed of the patient transfer and status.  Daughter states that the patient's wife is also hospitalized,     ICU Vital Signs Last 24 Hrs  T(C): 37.2 (31 Mar 2020 15:00), Max: 37.7 (31 Mar 2020 02:56)  T(F): 99 (31 Mar 2020 15:00), Max: 99.8 (31 Mar 2020 02:56)  HR: 72 (31 Mar 2020 15:00) (61 - 88)  BP: 115/48 (31 Mar 2020 15:00) (84/54 - 123/60)  BP(mean): 68 (31 Mar 2020 15:00) (68 - 68)  ABP: --  ABP(mean): --  RR: 26 (31 Mar 2020 15:00) (20 - 26)  SpO2: 96% (31 Mar 2020 15:00) (84% - 98%)      PHYSICAL EXAM:  intubated.     Eyes: rxn  ENMT: ET   Respiratory: coarse bs  Cardiovascular: RRR  Gastrointestinal:  soft, BS  Extremities: neg edema    MED:  albuterol, lipitor,, flomax, calcium gluconate, fentanyl, methyprednisone, levophed, protonix, miralax, thiamine, plaquenil, ascorbic acid, dilaudid, versed, protonix, xarelto hx of dvt.                         10.4   11.28 )-----------( 137      ( 31 Mar 2020 12:28 )             31.9   03-31    142  |  108<H>  |  73.0<H>  ----------------------------<  108<H>  5.1   |  19.0<L>  |  3.96<H>    Ca    8.0<L>      31 Mar 2020 12:28  Phos  3.7     03-31  Mg     2.3     03-31    TPro  5.7<L>  /  Alb  3.1<L>  /  TBili  0.5  /  DBili  x   /  AST  63<H>  /  ALT  23  /  AlkPhos  55  03-31    Blood Gas Profile - Arterial (03.31.20 @ 13:44)    pH, Arterial: 7.18: TYPE:(C=Critical, N=Notification, A=Abnormal) C  TESTS: _ABG w/lights  DATE/TIME CALLED: _03/31/20 13:46  CALLED TO: LIZETH Reyez  READ BACK (2 Patient Identifiers)(Y/N): _Y  READ BACK VALUES (Y/N): _Y  CALLED BY: ABDIRAHMAN [email or web address]    pCO2, Arterial: 53 mmHg    pO2, Arterial: 67 mmHg    HCO3, Arterial: 18 mmoL/L    Base Excess, Arterial: -8.4 mmol/L    Oxygen Saturation, Arterial: 88 %    FIO2, Arterial: 100%    Blood Gas Comments Arterial: AC,22,420,100%,20+    Blood Gas Source Arterial: Arterial      Impression/Plan  Pt intubated will place central access.  Pt on Plaquenil  will sedate while on vent  will consult Infectious Disease  Pt will be consider for additional anbx based on ID recommendation.

## 2020-03-31 NOTE — PROCEDURE NOTE - NSPROCDETAILS_GEN_ALL_CORE
lumen(s) aspirated and flushed/ultrasound guidance/sterile dressing applied/guidewire recovered/sterile technique, catheter placed
patient pre-oxygenated, tube inserted, placement confirmed
sutured in place/all materials/supplies accounted for at end of procedure/location identified, draped/prepped, sterile technique used, needle inserted/introduced/positive blood return obtained via catheter

## 2020-03-31 NOTE — CONSULT NOTE ADULT - ASSESSMENT
60 yo male with multiple medical problems now with hypoxic resp failure likely secondary to COVID pneumonia; intubated    Neuro: normal mental status prior to intubation; diprivan and prn opiates for comfort and sedation  CV: levophed for BP support, most likely due to sedation; QT normal on ECG yesterday, ok for plaquenil  Pulm: hypoxic resp failure requiring mechanical ventilation Tv 480 RR 22 PEEP 20 FiO2 100%; ABG pending  GI: OG tube, keep NPO  Renal: GODWIN, monitor I/Os; received gently hydration; will monitor, no diuresis yet   ID: highly suspicious for covid pneumonia; cef, azithro, and plaquenil ordered 58 yo male with multiple medical problems now with hypoxic resp failure likely secondary to COVID pneumonia; intubated    Neuro: normal mental status prior to intubation; diprivan and prn opiates for comfort and sedation  CV: levophed for BP support, most likely due to sedation; QT normal on ECG yesterday, ok for plaquenil  Pulm: hypoxic resp failure requiring mechanical ventilation Tv 480 RR 22 PEEP 20 FiO2 100%; ABG pending  GI: OG tube, keep NPO  Renal: GODWIN, monitor I/Os; received gently hydration; will monitor, no diuresis yet   ID: highly suspicious for covid pneumonia; cef, azithro, and plaquenil ordered  Heme: DVT - on xarelto for DVT  Endo: monitor glucose    Dispo: MICU, prognosis guarded

## 2020-03-31 NOTE — PROGRESS NOTE ADULT - ASSESSMENT
59 yr old male with hypertension, diabetes mellitus, hyperlipidemia, morbid obesity, ONBLE on CPAP, DVT s/p IVC filter on anticoagulation, CKD stage 3 admitted with complaints of one week of fatigue, body aches, progressively worsening shortness of breath with a cough    NEUROLOGIC  Mental status: sedated  Sedation:  [] propofol __ mcg/kg/min  [] midazolam __ mcg/kg/hr      RESPIRATORY  [] Acute respiratory failure with: [] hypoxemia / [] hypercapnia  [] ARDS  Ideal body weight:  Ventilation settings:    CARDIOVASCULAR  QTc:   CK:   Shock: [] septic [] cardiogenic  [x] norepinephrine __ mcg/kg/min    GASTROINTESTINAL:   [] Tube feeds:    current rate: __ ml/hr; goal rate: __ ml/hr  [] GI prophylaxis: [] famotidine [] pantoprazole  [] Bowel regimen [] senna [] Miralax     RENAL  [] CKD stage __ (baseline Cr: __)  [] Acute kidney injury [] non-oliguric [] oliguric   [] Renal replacement therapy [] CVVH [] HD   [] Electrolyte abnormalities:     ENDOCRINE  [] insulin regimen: [] infusion, [] glargine, [] NPH, [] sliding scale  [] stress-dose steroids  Goal FSG < 180    HEMATOLOGY  D-dimer: , LDH: , ESR: , ferritin:  chemical VTE prophylaxis: [] enoxaparin [] heparin       INFECTIOUS DISEASES SARS-CoV-2 infection/COVID-19  Bacterial super infection: [] Empiric antibiotics: (day)  Procalcitonin:   Culture data:   [] Hydroxycholoroquine (day)      ADVANCED DIRECTIVES:  [] Full code [] DNR [] DNI [] Comfort measures    SOCIAL:   [] Family updated: , contact:     DISPOSITION:   [] ICU    CRITICAL CARE TIME: 59 yr old male with hypertension, diabetes mellitus, hyperlipidemia, morbid obesity, NOBLE on CPAP, DVT s/p IVC filter on anticoagulation, CKD stage 3 admitted with complaints of one week of fatigue, body aches, progressively worsening shortness of breath with a cough    NEUROLOGIC  Mental status: sedated  Sedation:  [] propofol   [] midazolam      RESPIRATORY  [] Acute respiratory failure with: [x] hypoxemia / [] hypercapnia  [x] ARDS  Ideal body weight: 72kg  Ventilation settings: 28/420/70%/20    CARDIOVASCULAR  Shock: [x] septic   [x] norepinephrine __ mcg/kg/min  MAP>65    GASTROINTESTINAL:   [] Tube feeds:  start jevity  [] GI prophylaxis: [] famotidine [x] pantoprazole  [] Bowel regimen [] senna [x] Miralax     RENAL  [x] GODWIN on CKD (baseline Cr: _3.0_)   goal I/O even to -1L    ENDOCRINE  [] insulin regimen: [] infusion, [] glargine, [] NPH, [x] sliding scale  [x] stress-dose steroids  Goal FSG < 180    HEMATOLOGY  DVT  cont rivaroxaban      INFECTIOUS DISEASES   Rule out: SARS-CoV-2 infection/COVID-19  Procalcitonin: trend  afebrile  follow up COVID   [x] Hydroxycholoroquine (day)      ADVANCED DIRECTIVES:  [x] Full code [] DNR [] DNI [] Comfort measures    SOCIAL:   [] Family updated: , contact:     DISPOSITION:   [] ICU

## 2020-03-31 NOTE — CONSULT NOTE ADULT - SUBJECTIVE AND OBJECTIVE BOX
Patient is a 59y old  Male who presents with a chief complaint of shortness of breath (30 Mar 2020 19:47)      BRIEF HOSPITAL COURSE: 58 yo male morbidly obese hx of COPD HTN NOBLE DVT CKD presented last night to ED with SOB, found to be hypoxic on presentation; known COVID risk factors, xray with infiltrates; admitted on NRB with sats in low 90s    Events last 24 hours: Returned to floors called by admitting team to eval, as patient was now sat low 80s on NRB with resp rate in 30s; given worsening GODWIN, worsening hypoxia and resp distress, patient requiring intubation    PAST MEDICAL & SURGICAL HISTORY:  CKD (chronic kidney disease) stage 3, GFR 30-59 ml/min  Obesity  PVD (peripheral vascular disease)  DVT (deep venous thrombosis)  Pulmonary embolism  DM (diabetes mellitus)  HLD (hyperlipidemia)  HTN (hypertension)  No significant past surgical history        Medications:  azithromycin   Tablet 250 milliGRAM(s) Oral daily  hydroxychloroquine   Oral     norepinephrine Infusion 0.05 MICROgram(s)/kG/Min IV Continuous <Continuous>  tamsulosin Oral Tab/Cap - Peds 0.4 milliGRAM(s) Oral at bedtime    ALBUTerol    90 MICROgram(s) HFA Inhaler 2 Puff(s) Inhalation every 4 hours PRN  benzonatate 100 milliGRAM(s) Oral three times a day PRN    acetaminophen   Tablet .. 650 milliGRAM(s) Oral every 4 hours PRN  fentaNYL    Injectable 100 MICROGram(s) IV Push every 1 hour PRN  HYDROmorphone  Injectable 0.5 milliGRAM(s) IV Push every 2 hours PRN  propofol Infusion 10 MICROgram(s)/kG/Min IV Continuous <Continuous>      rivaroxaban 15 milliGRAM(s) Oral with dinner    pantoprazole  Injectable 40 milliGRAM(s) IV Push daily      atorvastatin 20 milliGRAM(s) Oral at bedtime        chlorhexidine 0.12% Liquid 15 milliLiter(s) Oral Mucosa every 12 hours  chlorhexidine 2% Cloths 1 Application(s) Topical <User Schedule>        Mode: AC/ CMV (Assist Control/ Continuous Mandatory Ventilation)  RR (machine): 22  TV (machine): 500  FiO2: 100  PEEP: 20      ICU Vital Signs Last 24 Hrs  T(C): 36.9 (31 Mar 2020 11:23), Max: 37.7 (31 Mar 2020 02:56)  T(F): 98.5 (31 Mar 2020 11:23), Max: 99.8 (31 Mar 2020 02:56)  HR: 76 (31 Mar 2020 12:34) (61 - 87)  BP: 113/59 (31 Mar 2020 12:34) (84/54 - 129/70)  BP(mean): --  ABP: --  ABP(mean): --  RR: 20 (31 Mar 2020 12:34) (20 - 28)  SpO2: 98% (31 Mar 2020 12:34) (73% - 98%)      ABG - ( 30 Mar 2020 15:28 )  pH, Arterial: 7.35  pH, Blood: x     /  pCO2: 35    /  pO2: 56    / HCO3: 20    / Base Excess: -5.4  /  SaO2: 88                  I&O's Detail        LABS:                        10.4   11.28 )-----------( 137      ( 31 Mar 2020 12:28 )             31.9     03-30    138  |  104  |  64.0<H>  ----------------------------<  134<H>  5.4<H>   |  19.0<L>  |  3.53<H>    Ca    8.7      30 Mar 2020 14:47    TPro  6.4<L>  /  Alb  3.4  /  TBili  0.8  /  DBili  x   /  AST  45<H>  /  ALT  18  /  AlkPhos  60  03-30          CAPILLARY BLOOD GLUCOSE        PT/INR - ( 30 Mar 2020 14:47 )   PT: 15.4 sec;   INR: 1.35 ratio         PTT - ( 30 Mar 2020 14:47 )  PTT:29.6 sec    CULTURES:      Physical Examination:    General: mod resp distress     HEENT: clear conjunctiva    PULM: crackles and course BS b/l    NECK: Supple, no lymphadenopathy, trachea midline    CVS: Regular rate and rhythm, no murmurs, rubs, or gallops    ABD: obese Soft, nondistended, nontender, normoactive bowel sounds, no masses    EXT: No edema, nontender    SKIN: Warm and well perfused, no rashes noted.    NEURO: Alert, oriented, interactive, nonfocal        RADIOLOGY: Diffuse upper and lower lobe bilateral pulmonary airspace consolidations..    CRITICAL CARE TIME SPENT: 60

## 2020-03-31 NOTE — PROVIDER CONTACT NOTE (CHANGE IN STATUS NOTIFICATION) - ACTION/TREATMENT ORDERED:
Anesthesia attended for urgent intubation Anesthesia attended for urgent intubation  Clinical team: Dr Kiel Reyez, Dr. Ascencio, Respiratory therapist HELENE Schmitt, [facility] PENG Quiñones, Tele PENG Shore

## 2020-03-31 NOTE — PROGRESS NOTE ADULT - SUBJECTIVE AND OBJECTIVE BOX
Chief complaint:   Patient is a 59y old  Male who presents with a chief complaint of shortness of breath (31 Mar 2020 14:42)    HPI:  59 yr old male with hypertension, diabetes mellitus, hyperlipidemia, morbid obesity, NOBLE on CPAP, DVT s/p IVC filter on anticoagulation, CKD stage 3 admitted with complaints of one week of fatigue, body aches, progressively worsening shortness of breath with a cough. Recently lost father in law. States his wife has similar symptoms and is in the ED with similar symptoms. He is not on home oxygen. Severely hypoxic in ED. No nausea, vomiting, abdominal pain, diarrhea. Follows nephrology. (30 Mar 2020 19:47)      Stay Summary:      OVERNIGHT EVENTS:      ROS: [ ]  Unable to assess due to mental status   All other systems negative    -----------------------------------------------------------------------------------------------------------------------------------------------------------------------------------  ICU Vital Signs Last 24 Hrs  T(C): 37.2 (31 Mar 2020 15:00), Max: 37.7 (31 Mar 2020 02:56)  T(F): 99 (31 Mar 2020 15:00), Max: 99.8 (31 Mar 2020 02:56)  HR: 72 (31 Mar 2020 15:00) (61 - 88)  BP: 115/48 (31 Mar 2020 15:00) (84/54 - 123/60)  BP(mean): 68 (31 Mar 2020 15:00) (68 - 68)  ABP: --  ABP(mean): --  RR: 26 (31 Mar 2020 15:00) (20 - 26)  SpO2: 96% (31 Mar 2020 15:00) (84% - 98%)      I&O's Summary    31 Mar 2020 07:01  -  31 Mar 2020 16:09  --------------------------------------------------------  IN: 372.8 mL / OUT: 0 mL / NET: 372.8 mL        MEDICATIONS  (STANDING):  ascorbic acid IVPB 1500 milliGRAM(s) IV Intermittent every 6 hours  atorvastatin 20 milliGRAM(s) Oral at bedtime  calcium gluconate IVPB 2 Gram(s) IV Intermittent once  chlorhexidine 0.12% Liquid 15 milliLiter(s) Oral Mucosa every 12 hours  chlorhexidine 2% Cloths 1 Application(s) Topical <User Schedule>  hydroxychloroquine 200 milliGRAM(s) Oral every 12 hours  methylPREDNISolone sodium succinate Injectable 125 milliGRAM(s) IV Push two times a day  midazolam Infusion 0.02 mG/kG/Hr (3.62 mL/Hr) IV Continuous <Continuous>  norepinephrine Infusion 0.05 MICROgram(s)/kG/Min (17 mL/Hr) IV Continuous <Continuous>  pantoprazole  Injectable 40 milliGRAM(s) IV Push daily  polyethylene glycol 3350 17 Gram(s) Oral at bedtime  propofol Infusion 10 MICROgram(s)/kG/Min (10.9 mL/Hr) IV Continuous <Continuous>  rivaroxaban 15 milliGRAM(s) Oral with dinner  sodium chloride 0.45% 1000 milliLiter(s) (100 mL/Hr) IV Continuous <Continuous>  thiamine Injectable 200 milliGRAM(s) IV Push <User Schedule>      RESPIRATORY:  Mode: AC/ CMV (Assist Control/ Continuous Mandatory Ventilation)  RR (machine): 26  TV (machine): 420  FiO2: 100  PEEP: 20  ITime: 0.7  MAP: 24  PIP: 32        NEUROIMAGING:   Recent imaging studies were reviewed.    LAB RESULTS:                          10.4   11.28 )-----------( 137      ( 31 Mar 2020 12:28 )             31.9       PT/INR - ( 30 Mar 2020 14:47 )   PT: 15.4 sec;   INR: 1.35 ratio         PTT - ( 30 Mar 2020 14:47 )  PTT:29.6 sec    03-31    142  |  108<H>  |  73.0<H>  ----------------------------<  108<H>  5.1   |  19.0<L>  |  3.96<H>    Ca    8.0<L>      31 Mar 2020 12:28  Phos  3.7     03-31  Mg     2.3     03-31    TPro  5.7<L>  /  Alb  3.1<L>  /  TBili  0.5  /  DBili  x   /  AST  63<H>  /  ALT  23  /  AlkPhos  55  03-31      ABG - ( 31 Mar 2020 13:44 )  pH, Arterial: 7.18  pH, Blood: x     /  pCO2: 53    /  pO2: 67    / HCO3: 18    / Base Excess: -8.4  /  SaO2: 88        -----------------------------------------------------------------------------------------------------------------------------------------------------------------------------------    PHYSICAL EXAM:  General: Calm, intubated sedated  HEENT: MMM  Neuro:  -Mental status- No acute distress  -CN- PERRL 3mm, EOMI, tongue midline, face symmetric  + corneals/cough/gag    CV: RRR  Pulm: Clear to auscultation  Abd: Soft, nontender, nondistended  Ext: No edema  Skin: warm, dry Chief complaint:   Patient is a 59y old  Male who presents with a chief complaint of shortness of breath (31 Mar 2020 14:42)    HPI:  59 yr old male with hypertension, diabetes mellitus, hyperlipidemia, morbid obesity, NOBLE on CPAP, DVT s/p IVC filter on anticoagulation, CKD stage 3 admitted with complaints of one week of fatigue, body aches, progressively worsening shortness of breath with a cough. Recently lost father in law. States his wife has similar symptoms and is in the ED with similar symptoms. He is not on home oxygen. Severely hypoxic in ED. No nausea, vomiting, abdominal pain, diarrhea. Follows nephrology. (30 Mar 2020 19:47)    ROS: [ x]  Unable to assess due to mental status   All other systems negative    -----------------------------------------------------------------------------------------------------------------------------------------------------------------------------------  ICU Vital Signs Last 24 Hrs  T(C): 37.2 (31 Mar 2020 15:00), Max: 37.7 (31 Mar 2020 02:56)  T(F): 99 (31 Mar 2020 15:00), Max: 99.8 (31 Mar 2020 02:56)  HR: 72 (31 Mar 2020 15:00) (61 - 88)  BP: 115/48 (31 Mar 2020 15:00) (84/54 - 123/60)  BP(mean): 68 (31 Mar 2020 15:00) (68 - 68)  ABP: --  ABP(mean): --  RR: 26 (31 Mar 2020 15:00) (20 - 26)  SpO2: 96% (31 Mar 2020 15:00) (84% - 98%)      I&O's Summary    31 Mar 2020 07:01  -  31 Mar 2020 16:09  --------------------------------------------------------  IN: 372.8 mL / OUT: 0 mL / NET: 372.8 mL        MEDICATIONS  (STANDING):  ascorbic acid IVPB 1500 milliGRAM(s) IV Intermittent every 6 hours  atorvastatin 20 milliGRAM(s) Oral at bedtime  calcium gluconate IVPB 2 Gram(s) IV Intermittent once  chlorhexidine 0.12% Liquid 15 milliLiter(s) Oral Mucosa every 12 hours  chlorhexidine 2% Cloths 1 Application(s) Topical <User Schedule>  hydroxychloroquine 200 milliGRAM(s) Oral every 12 hours  methylPREDNISolone sodium succinate Injectable 125 milliGRAM(s) IV Push two times a day  midazolam Infusion 0.02 mG/kG/Hr (3.62 mL/Hr) IV Continuous <Continuous>  norepinephrine Infusion 0.05 MICROgram(s)/kG/Min (17 mL/Hr) IV Continuous <Continuous>  pantoprazole  Injectable 40 milliGRAM(s) IV Push daily  polyethylene glycol 3350 17 Gram(s) Oral at bedtime  propofol Infusion 10 MICROgram(s)/kG/Min (10.9 mL/Hr) IV Continuous <Continuous>  rivaroxaban 15 milliGRAM(s) Oral with dinner  sodium chloride 0.45% 1000 milliLiter(s) (100 mL/Hr) IV Continuous <Continuous>  thiamine Injectable 200 milliGRAM(s) IV Push <User Schedule>      RESPIRATORY:  Mode: AC/ CMV (Assist Control/ Continuous Mandatory Ventilation)  RR (machine): 26  TV (machine): 420  FiO2: 100  PEEP: 20  ITime: 0.7  MAP: 24  PIP: 32        NEUROIMAGING:   Recent imaging studies were reviewed.    LAB RESULTS:                          10.4   11.28 )-----------( 137      ( 31 Mar 2020 12:28 )             31.9       PT/INR - ( 30 Mar 2020 14:47 )   PT: 15.4 sec;   INR: 1.35 ratio         PTT - ( 30 Mar 2020 14:47 )  PTT:29.6 sec    03-31    142  |  108<H>  |  73.0<H>  ----------------------------<  108<H>  5.1   |  19.0<L>  |  3.96<H>    Ca    8.0<L>      31 Mar 2020 12:28  Phos  3.7     03-31  Mg     2.3     03-31    TPro  5.7<L>  /  Alb  3.1<L>  /  TBili  0.5  /  DBili  x   /  AST  63<H>  /  ALT  23  /  AlkPhos  55  03-31      ABG - ( 31 Mar 2020 13:44 )  pH, Arterial: 7.18  pH, Blood: x     /  pCO2: 53    /  pO2: 67    / HCO3: 18    / Base Excess: -8.4  /  SaO2: 88        -----------------------------------------------------------------------------------------------------------------------------------------------------------------------------------    PHYSICAL EXAM: obese  General: Calm, intubated sedated  HEENT: MMM  Neuro:  -Mental status- No acute distress  -CN- PERRL 3mm, EOMI, tongue midline, face symmetric  + corneals/cough/gag    CV: RRR  Pulm: diminished breath sounds to auscultation  Abd: Soft, nontender, obese  Ext: lower ext edema  Skin: warm, dry

## 2020-03-31 NOTE — ED ADULT NURSE REASSESSMENT NOTE - NS ED NURSE REASSESS COMMENT FT1
Late Note  Report received at changed of shift from outgoing PENG SEVILLA and assumed care of pt at that time. Pt received sitting up in bed, awake, alert and oriented, calm, no acute distress, HR 72, SpO2 90% on 100% NRB, endorsed to PENG Farrell for follow up and continuity of care.

## 2020-03-31 NOTE — PROGRESS NOTE ADULT - PROBLEM SELECTOR PLAN 1
Worsening hypoxia on NRB, discussed with patient with ICU team present at bedside. Plan to proceed with intubation.

## 2020-04-01 NOTE — CONSULT NOTE ADULT - ASSESSMENT
59 yr old male with hypertension, diabetes mellitus, hyperlipidemia, morbid obesity, NOBLE on CPAP, DVT s/p IVC filter on anticoagulation, CKD stage 3 admitted with complaints of one week of fatigue, body aches, progressively worsening shortness of breath with a cough. Recently lost father in law. States his wife has similar symptoms and is in the ED with similar symptoms. He is not on home oxygen. Severely hypoxic in ED. No nausea, vomiting, abdominal pain, diarrhea. Follows nephrology. (30 Mar 2020 19:47)    patient was intubated on 3/31/2020. Now under ICU care in [address] [place] [address].   COVID 19 testing positive.  started on Hydroxychloroquine      Impression:  COVID-19 Pneumonia  multifocal Pneumonia  acute hypoxic respiratory failuire  s/p intubation    Plan:   Continue supportive care measures  - continue mechanical ventilation  - continue steroids course as per ICU team      continue course of Hydroxychloroquine  - will need to monitor for  QT prolongation    - follow up on COVID-19 testing,   if positive start Hydroxychloroquine 400mg PO BID x 2 doses, then 200mg PO BID x 4 more days.     continue to trend inflammatory markers.     If procalcitonin starts to rise, may need to consider treating for secondary bacterial infections  Procalcitonin, Serum: 0.60 ng/mL (04-01-20 @ 05:06)  Procalcitonin, Serum: 0.34 ng/mL (03-30-20 @ 14:47)    - trend CBC with diff, CMP with LFT's  - trend Inflammatory markers (ESR, CRP, Ferritin, LDH,  procalcitonin)  q48h.  D dimer, lactate, troponin, CK, PT/PTT x 1    - follow up all outstanding cultures  - trend temperature and WBC curve  - repeat cultures from blood and all sources if febrile.      Will follow with you.

## 2020-04-01 NOTE — CHART NOTE - NSCHARTNOTEFT_GEN_A_CORE
20 @ 21:43  Charting and recommendations based on EMR review at offsite location.     Ideal body weight:  75kg                           6-8mL/k-600mL  Mode: AC/ CMV (Assist Control/ Continuous Mandatory Ventilation), RR (machine): 30, TV (machine): 480, FiO2: 50, PEEP: 18, ITime: 0.6, MAP: 24, PIP: 34  Plateau pressure: 28  ABG - ( 2020 16:07 )  pH, Arterial: 7.25  pH, Blood: x     /  pCO2: 42    /  pO2: 87    / HCO3: 18    / Base Excess: -7.9  /  SaO2: 97        T(C): 37.7 (20 @ 17:40), Max: 37.7 (20 @ 17:40)  HR: 62 (20 @ 20:17) (58 - 70)  BP: 127/58 (20 @ 20:15) (112/50 - 145/64)  RR: 28 (20 @ 20:15) (26 - 30)  SpO2: 97% (20 @ 20:17) (96% - 100%)    20 @ 07:  -  20 @ 07:00  --------------------------------------------------------  IN: 3143.6 mL / OUT: 0 mL / NET: 3143.6 mL    20 @ 07:  -  20 @ 21:43  --------------------------------------------------------  IN: 0 mL / OUT: 300 mL / NET: -300 mL    Sedation: Propofol/Versed drips, Dilaudid PRN   Paralytic: N  Plaquenil: 3/31--  CRP: N/A  Steroids: Y    Recommendations:  --Consider increasing tidal volume to 7mL/IBW kg = 525 if able to keep plateau pressures under 30 to compensate for metabolic acidosis  --Cautious titration of PEEP 18--> 16 with pO2 of 87 on 50% fio2

## 2020-04-01 NOTE — CHART NOTE - NSCHARTNOTEFT_GEN_A_CORE
[facility] RX per NP Priyanka request:    Current diet order for continuous feed: Glucerna 1.5 at goal rate of 60 ml/hr (x20 hrs) providing 1200 ml, 1800 kcal, 99g protein, 911 ml free water. Additional free water per MD discretion.    Bolus RX: Glucerna 1.5 300ml x 4 daily bolus to provide 1200 ml, 1800 kcal, 99g protein, 911 ml free water. Additional free water per MD discretion.     RD to remain available. Bolus RX per NP Priyanka request:    Current diet order for continuous feed: Glucerna 1.5 at goal rate of 60 ml/hr (x20 hrs) providing 1200 ml, 1800 kcal, 99g protein, 911 ml free water. Additional free water per MD discretion.    Bolus RX: Glucerna 1.5 300ml x 4 daily bolus to provide 1200 ml, 1800 kcal, 99g protein, 911 ml free water. Additional free water per MD discretion.     If pt not tolerating: Glucerna 1.5 200ml x 6 daily bolus     RD to remain available.

## 2020-04-01 NOTE — PROCEDURE NOTE - NSICDXPROCEDURE_GEN_ALL_CORE_FT
PROCEDURES:  Insertion, catheter, hemodialysis, non-tunneled, with US guidance 01-Apr-2020 14:06:04  Thalia Lisa

## 2020-04-01 NOTE — DIETITIAN INITIAL EVALUATION ADULT. - ENTERAL
RX: Vital AF 1.2 initiated at 20ml/hr and advance 10ml/hr q 4 hrs to goal rate of 40ml/hr (x20hrs) to provide 800ml, 960kcal, 60g pro, 649ml free water. Additional free water per MD discretion. If medically feasible add Pro-Stat BID for an additional 100kcal and 15g protein per serving. If bolus: Vital AF 1.2 200ml x 4 daily bolus. RX: If medically feasible, change EN to Vital AF 1.2 initiated at 20ml/hr and advance 10ml/hr q 6 hrs to goal rate of 40ml/hr (x20hrs) to provide 800ml, 960kcal, 60g pro, 649ml free water. Additional free water per MD discretion. If medically feasible add Pro-Stat BID for an additional 100kcal and 15g protein per serving. If bolus: Vital AF 1.2 200ml x 4 daily bolus.

## 2020-04-01 NOTE — CHART NOTE - NSCHARTNOTEFT_GEN_A_CORE
Leighton Mcadams admitted to [facility] yesterday.   Daughter Jessa was provided with updates on pt's current status.   All questions answered.

## 2020-04-01 NOTE — CONSULT NOTE ADULT - SUBJECTIVE AND OBJECTIVE BOX
[facility]  INFECTIOUS DISEASES AND INTERNAL MEDICINE at [place]  =======================================================  Miguel Dee MD  Diplomates American Board of Internal Medicine and Infectious Diseases  Telephone [phone]  Fax            [phone]  =======================================================    [facility]-[id number]  FRENCH DEY   HPI:  59 yr old male with hypertension, diabetes mellitus, hyperlipidemia, morbid obesity, NOBLE on CPAP, DVT s/p IVC filter on anticoagulation, CKD stage 3 admitted with complaints of one week of fatigue, body aches, progressively worsening shortness of breath with a cough. Recently lost father in law. States his wife has similar symptoms and is in the ED with similar symptoms. He is not on home oxygen. Severely hypoxic in ED. No nausea, vomiting, abdominal pain, diarrhea. Follows nephrology. (30 Mar 2020 19:47)    patient was intubated on 3/31/2020. Now under ICU care in [address] [place] [address].     COVID 19 testing positive.  started on Hydroxychloroquine    I have personally reviewed the labs and data; pertinent labs and data are listed in this note; please see below.   =======================================================  Past Medical & Surgical Hx:  =====================  PAST MEDICAL & SURGICAL HISTORY:  CKD (chronic kidney disease) stage 3, GFR 30-59 ml/min  Obesity  PVD (peripheral vascular disease)  DVT (deep venous thrombosis)  Pulmonary embolism  DM (diabetes mellitus)  HLD (hyperlipidemia)  HTN (hypertension)  No significant past surgical history    Problem List:  ==========  HEALTH ISSUES - PROBLEM Dx:  Hypertension: Hypertension  DVT (deep venous thrombosis): DVT (deep venous thrombosis)  HLD (hyperlipidemia): HLD (hyperlipidemia)  DM (diabetes mellitus): DM (diabetes mellitus)  CKD (chronic kidney disease) stage 3, GFR 30-59 ml/min: CKD (chronic kidney disease) stage 3, GFR 30-59 ml/min      Social Hx:  =======  no toxic habits currently    FAMILY HISTORY:  Family history of lung cancer  no significant family history of immunosuppressive disorders in mother or father   =======================================================  REVIEW OF SYSTEMS:  Limited due to medical condition    =======================================================  Allergies  penicillin (Fever)      Antibiotics:  hydroxychloroquine 200 milliGRAM(s) Oral every 12 hours    Other medications:  atorvastatin 20 milliGRAM(s) Oral at bedtime  chlorhexidine 0.12% Liquid 15 milliLiter(s) Oral Mucosa every 12 hours  chlorhexidine 2% Cloths 1 Application(s) Topical <User Schedule>  dextrose 5%. 1000 milliLiter(s) IV Continuous <Continuous>  dextrose 50% Injectable 12.5 Gram(s) IV Push once  dextrose 50% Injectable 25 Gram(s) IV Push once  dextrose 50% Injectable 25 Gram(s) IV Push once  insulin lispro (HumaLOG) corrective regimen sliding scale   SubCutaneous three times a day before meals  methylPREDNISolone sodium succinate Injectable 125 milliGRAM(s) IV Push two times a day  midazolam Infusion 0.02 mG/kG/Hr IV Continuous <Continuous>  norepinephrine Infusion 0.05 MICROgram(s)/kG/Min IV Continuous <Continuous>  pantoprazole  Injectable 40 milliGRAM(s) IV Push daily  polyethylene glycol 3350 17 Gram(s) Oral at bedtime  propofol Infusion 10 MICROgram(s)/kG/Min IV Continuous <Continuous>  rivaroxaban 15 milliGRAM(s) Oral with dinner  sodium chloride 0.45% 1000 milliLiter(s) IV Continuous <Continuous>  thiamine Injectable 200 milliGRAM(s) IV Push <User Schedule>     azithromycin   Tablet   250 milliGRAM(s) Oral (03-31-20 @ 11:06)  azithromycin  IVPB   255 mL/Hr IV Intermittent (03-30-20 @ 15:16)    hydroxychloroquine   400 milliGRAM(s) Oral (03-31-20 @ 00:48)   400 milliGRAM(s) Oral (03-31-20 @ 11:06)  hydroxychloroquine   200 milliGRAM(s) Oral (03-31-20 @ 23:00)   200 milliGRAM(s) Oral (04-01-20 @ 09:31)      ======================================================  Physical Exam:  ============  T(F): 97.9 (01 Apr 2020 09:19), Max: 99.6 (01 Apr 2020 01:03)  HR: 60 (01 Apr 2020 11:00)  BP: 145/64 (01 Apr 2020 10:13)  RR: 30 (01 Apr 2020 11:00)  SpO2: 100% (01 Apr 2020 11:00) (96% - 100%)  temp max in last 48H T(F): , Max: 99.8 (03-31-20 @ 02:56)    General:  sedated.; OBESE  Eye: Pupils are equal, round and reactive to light, Extraocular movements are intact, Normal conjunctiva.  HENT: Normocephalic, dry OM, ET tube in place  Neck: Supple, No lymphadenopathy.  Respiratory: Lungs with diminished air entry  Cardiovascular: Normal rate, Regular rhythm,   Gastrointestinal: Soft, Non-distended, Normal bowel sounds.  Genitourinary:  SAWYER   Lymphatics: No lymphadenopathy neck,   Musculoskeletal: unable to assess  Integumentary: no rash  Neurologic: sedated       =======================================================  Labs:                        10.0   14.47 )-----------( 137      ( 01 Apr 2020 05:06 )             32.4       WBC Count: 14.47 K/uL (04-01-20 @ 05:06)  WBC Count: 11.28 K/uL (03-31-20 @ 12:28)  WBC Count: 10.67 K/uL (03-30-20 @ 14:47)      04-01    137  |  102  |  87.0<H>  ----------------------------<  202<H>  6.6<HH>   |  17.0<L>  |  4.91<H>    Ca    8.3<L>      01 Apr 2020 05:06  Phos  6.7     04-01  Mg     2.5     04-01    TPro  5.8<L>  /  Alb  3.1<L>  /  TBili  0.8  /  DBili  x   /  AST  45<H>  /  ALT  22  /  AlkPhos  60  04-01      Creatinine, Serum: 4.91 mg/dL (04-01-20 @ 05:06)  Creatinine, Serum: 3.96 mg/dL (03-31-20 @ 12:28)  Creatinine, Serum: 3.53 mg/dL (03-30-20 @ 14:47)    C-Reactive Protein, Serum: 9.95 mg/dL (04-01-20 @ 05:06)  C-Reactive Protein, Serum: 7.17 mg/dL (03-30-20 @ 14:47)    Sedimentation Rate, Erythrocyte: 41 mm/hr (03-30-20 @ 14:47)    Procalcitonin, Serum: 0.60 ng/mL (04-01-20 @ 05:06)  Procalcitonin, Serum: 0.34 ng/mL (03-30-20 @ 14:47)      COVID-19 PCR: Detected (03-30-20 @ 20:42)           Antibiotics Course:  azithromycin   Tablet   250 milliGRAM(s) (03-31-20 @ 11:06)    azithromycin  IVPB   255 mL/Hr (03-30-20 @ 15:16)    hydroxychloroquine   400 milliGRAM(s) (03-31-20 @ 11:06)   400 milliGRAM(s) (03-31-20 @ 00:48)    hydroxychloroquine   200 milliGRAM(s) (04-01-20 @ 09:31)   200 milliGRAM(s) (03-31-20 @ 23:00)

## 2020-04-01 NOTE — CONSULT NOTE ADULT - SUBJECTIVE AND OBJECTIVE BOX
Patient is a 59y old  Male who presents with a chief complaint of shortness of breath (2020 12:08)      HPI:  59 yr old male with hypertension, diabetes mellitus, hyperlipidemia, morbid obesity, NOBLE on CPAP, DVT s/p IVC filter on anticoagulation, CKD stage 3 admitted with complaints of one week of fatigue, body aches, progressively worsening shortness of breath with a cough. Recently lost father in law. States his wife has similar symptoms and is in the ED with similar symptoms. He is not on home oxygen. Severely hypoxic in ED. No nausea, vomiting, abdominal pain, diarrhea. Follows nephrology. (30 Mar 2020 19:47)    Interim noted   + Progressive GODWIN   Net UO noted   + Hyperkalema ( treated ) and progressive acidosis  Vascular surgery placed a LIJ Reilly cath   Post procedure CXR is OK   SBP > 140   on some Levoophed   Started on IV Steroids   Covid 19 therapy noted       PAST MEDICAL & SURGICAL HISTORY:  CKD (chronic kidney disease) stage 3, GFR 30-59 ml/min  Obesity  PVD (peripheral vascular disease)  DVT (deep venous thrombosis)  Pulmonary embolism  DM (diabetes mellitus)  HLD (hyperlipidemia)  HTN (hypertension)  No significant past surgical history      FAMILY HISTORY:  Family history of lung cancer      Social History:    MEDICATIONS  (STANDING):  atorvastatin 20 milliGRAM(s) Oral at bedtime  chlorhexidine 0.12% Liquid 15 milliLiter(s) Oral Mucosa every 12 hours  chlorhexidine 2% Cloths 1 Application(s) Topical <User Schedule>  chlorhexidine 4% Liquid 1 Application(s) Topical <User Schedule>  dextrose 5%. 1000 milliLiter(s) (50 mL/Hr) IV Continuous <Continuous>  dextrose 50% Injectable 12.5 Gram(s) IV Push once  dextrose 50% Injectable 25 Gram(s) IV Push once  dextrose 50% Injectable 25 Gram(s) IV Push once  hydroxychloroquine 200 milliGRAM(s) Oral every 12 hours  insulin lispro (HumaLOG) corrective regimen sliding scale   SubCutaneous three times a day before meals  methylPREDNISolone sodium succinate Injectable 125 milliGRAM(s) IV Push two times a day  midazolam Infusion 0.02 mG/kG/Hr (3.62 mL/Hr) IV Continuous <Continuous>  norepinephrine Infusion 0.05 MICROgram(s)/kG/Min (17 mL/Hr) IV Continuous <Continuous>  pantoprazole  Injectable 40 milliGRAM(s) IV Push daily  polyethylene glycol 3350 17 Gram(s) Oral at bedtime  propofol Infusion 10 MICROgram(s)/kG/Min (10.9 mL/Hr) IV Continuous <Continuous>  rivaroxaban 15 milliGRAM(s) Oral with dinner  sodium chloride 0.45% 1000 milliLiter(s) (100 mL/Hr) IV Continuous <Continuous>  thiamine Injectable 200 milliGRAM(s) IV Push <User Schedule>    MEDICATIONS  (PRN):  acetaminophen   Tablet .. 650 milliGRAM(s) Oral every 4 hours PRN Temp greater or equal to 38.5C (101.3F)  ALBUTerol    90 MICROgram(s) HFA Inhaler 2 Puff(s) Inhalation every 4 hours PRN Shortness of Breath and/or Wheezing  dextrose 40% Gel 15 Gram(s) Oral once PRN Blood Glucose LESS THAN 70 milliGRAM(s)/deciliter  glucagon  Injectable 1 milliGRAM(s) IntraMuscular once PRN Glucose LESS THAN 70 milligrams/deciliter  HYDROmorphone  Injectable 0.5 milliGRAM(s) IV Push every 2 hours PRN Moderate Pain (4 - 6)  sodium chloride 0.9% lock flush 10 milliLiter(s) IV Push every 1 hour PRN Pre/post blood products, medications, blood draw, and to maintain line patency      Allergies    penicillin (Fever)    Intolerances        Vital Signs Last 24 Hrs  T(C): 36.6 (2020 09:19), Max: 37.6 (2020 01:03)  T(F): 97.9 (2020 09:19), Max: 99.6 (2020 01:03)  HR: 62 (2020 15:24) (58 - 70)  BP: 134/61 (2020 15:24) (112/50 - 145/64)  BP(mean): 80 (2020 15:24) (68 - 86)  RR: 30 (2020 15:24) (26 - 30)  SpO2: 97% (2020 15:24) (97% - 100%)  Daily     Daily Weight in k (2020 09:43)  I&O's Detail    31 Mar 2020 07:01  -  2020 07:00  --------------------------------------------------------  IN:    midazolam Infusion: 127.2 mL    norepinephrine Infusion: 301.6 mL    propofol Infusion: 764.8 mL    sodium chloride 0.45%: 1700 mL    Solution: 100 mL    Solution: 150 mL  Total IN: 3143.6 mL    OUT:  Total OUT: 0 mL    Total NET: 3143.6 mL      2020 07:  -  2020 16:28  --------------------------------------------------------  IN:  Total IN: 0 mL    OUT:    Indwelling Catheter - Urethral: 300 mL  Total OUT: 300 mL    Total NET: -300 mL        I&O's Summary    31 Mar 2020 07:01  -  2020 07:00  --------------------------------------------------------  IN: 3143.6 mL / OUT: 0 mL / NET: 3143.6 mL    2020 07:  -  2020 16:28  --------------------------------------------------------  IN: 0 mL / OUT: 300 mL / NET: -300 mL        PHYSICAL EXAM:    GENERAL: morbidly obese , Intubated   HEAD: intubated   NECK: Supple, No JVD, L IJ Reilly placed   CHEST/LUNG: EAE , Rhonchi . No wheeze   HEART: Regular rate and rhythm; No murmurs, rubs, or gallops  ABDOMEN: Soft, morbidly obese  EXTREMITIES:  + edema           LABS:                        10.0   14.47 )-----------( 137      ( 2020 05:06 )             32.4     04    137  |  102  |  87.0<H>  ----------------------------<  202<H>  6.6<HH>   |  17.0<L>  |  4.91<H>    Ca    8.3<L>      2020 05:06  Phos  6.7       Mg     2.5         TPro  5.8<L>  /  Alb  3.1<L>  /  TBili  0.8  /  DBili  x   /  AST  45<H>  /  ALT  22  /  AlkPhos  60        Urinalysis Basic - ( 2020 03:24 )    Color: Yellow / Appearance: Clear / S.025 / pH: x  Gluc: x / Ketone: Negative  / Bili: Negative / Urobili: 1 mg/dL   Blood: x / Protein: 100 mg/dL / Nitrite: Negative   Leuk Esterase: Negative / RBC: 0-2 /HPF / WBC Negative   Sq Epi: x / Non Sq Epi: Occasional / Bacteria: x      Magnesium, Serum: 2.5 mg/dL ( @ 05:06)  Phosphorus Level, Serum: 6.7 mg/dL ( @ 05:06)    ABG - ( 2020 06:45 )  pH, Arterial: 7.14  pH, Blood: x     /  pCO2: 55    /  pO2: 182   / HCO3: 16    / Base Excess: -10.1 /  SaO2: 100            EXAM:  XR CHEST PORTABLE URGENT 1V                          PROCEDURE DATE:  2020          INTERPRETATION:  Portable chest radiograph        CLINICAL INFORMATION:   LEFT IJ catheter placement.    TECHNIQUE:  Portable  AP view of the chest was obtained.    COMPARISON: 3/31/2020 available for review.    FINDINGS:  LEFT IJ catheter tip in SVC.  ET tube tip above tracheal bifurcation.  NG tube tip beyond GE junction.  RIGHT IJ catheter tip in SVC  The lungs  show persistent multifocal and diffuse airspace consolidations..    The heart and mediastinum are within normal limits.    Visualized osseous structures are intact.        IMPRESSION:   LEFT IJ catheter tip in SVC. Remaining catheters in place otherwise no interval change              RADIOLOGY & ADDITIONAL TESTS:

## 2020-04-01 NOTE — PROCEDURE NOTE - NSPOSTCAREGUIDE_GEN_A_CORE
Verbal/written post procedure instructions were given to patient/caregiver
Verbal/written post procedure instructions were given to patient/caregiver
Care for catheter as per unit/ICU protocols
Verbal/written post procedure instructions were given to patient/caregiver

## 2020-04-01 NOTE — DIETITIAN INITIAL EVALUATION ADULT. - PERTINENT LABORATORY DATA
04-01 Na137 mmol/L Glu 202 mg/dL<H> K+ 6.6 mmol/L<HH> Cr  4.91 mg/dL<H> BUN 87.0 mg/dL<H> Phos 6.7 mg/dL<H> Alb 3.1 g/dL<L> PAB n/a

## 2020-04-01 NOTE — PROGRESS NOTE ADULT - SUBJECTIVE AND OBJECTIVE BOX
Chief complaint:   Patient is a 59y old  Male who presents with a chief complaint of shortness of breath (31 Mar 2020 16:08)    HPI:  59 yr old male with hypertension, diabetes mellitus, hyperlipidemia, morbid obesity, NOBLE on CPAP, DVT s/p IVC filter on anticoagulation, CKD stage 3 admitted with complaints of one week of fatigue, body aches, progressively worsening shortness of breath with a cough. Recently lost father in law. States his wife has similar symptoms and is in the ED with similar symptoms. He is not on home oxygen. Severely hypoxic in ED. No nausea, vomiting, abdominal pain, diarrhea. Follows nephrology. (30 Mar 2020 19:47)      Stay Summary:      OVERNIGHT EVENTS:      ROS: [ ]  Unable to assess due to mental status   All other systems negative    -----------------------------------------------------------------------------------------------------------------------------------------------------------------------------------  ICU Vital Signs Last 24 Hrs  T(C): 37.6 (01 Apr 2020 01:03), Max: 37.6 (01 Apr 2020 01:03)  T(F): 99.6 (01 Apr 2020 01:03), Max: 99.6 (01 Apr 2020 01:03)  HR: 59 (01 Apr 2020 08:38) (58 - 88)  BP: 142/62 (01 Apr 2020 08:27) (97/52 - 142/62)  BP(mean): 83 (01 Apr 2020 08:27) (68 - 83)  ABP: 179/65 (01 Apr 2020 08:27) (179/65 - 179/65)  ABP(mean): 89 (01 Apr 2020 08:27) (89 - 89)  RR: 30 (01 Apr 2020 08:27) (20 - 30)  SpO2: 100% (01 Apr 2020 08:38) (84% - 100%)      I&O's Summary    31 Mar 2020 07:01  -  01 Apr 2020 07:00  --------------------------------------------------------  IN: 3143.6 mL / OUT: 0 mL / NET: 3143.6 mL    01 Apr 2020 07:01  -  01 Apr 2020 08:46  --------------------------------------------------------  IN: 0 mL / OUT: 50 mL / NET: -50 mL        MEDICATIONS  (STANDING):  atorvastatin 20 milliGRAM(s) Oral at bedtime  chlorhexidine 0.12% Liquid 15 milliLiter(s) Oral Mucosa every 12 hours  chlorhexidine 2% Cloths 1 Application(s) Topical <User Schedule>  hydroxychloroquine 200 milliGRAM(s) Oral every 12 hours  methylPREDNISolone sodium succinate Injectable 125 milliGRAM(s) IV Push two times a day  midazolam Infusion 0.02 mG/kG/Hr (3.62 mL/Hr) IV Continuous <Continuous>  norepinephrine Infusion 0.05 MICROgram(s)/kG/Min (17 mL/Hr) IV Continuous <Continuous>  pantoprazole  Injectable 40 milliGRAM(s) IV Push daily  polyethylene glycol 3350 17 Gram(s) Oral at bedtime  propofol Infusion 10 MICROgram(s)/kG/Min (10.9 mL/Hr) IV Continuous <Continuous>  rivaroxaban 15 milliGRAM(s) Oral with dinner  sodium chloride 0.45% 1000 milliLiter(s) (100 mL/Hr) IV Continuous <Continuous>  thiamine Injectable 200 milliGRAM(s) IV Push <User Schedule>      RESPIRATORY:  Mode: AC/ CMV (Assist Control/ Continuous Mandatory Ventilation)  RR (machine): 30  TV (machine): 480  FiO2: 70  PEEP: 18  ITime: 0.6  MAP: 23  PIP: 34        NEUROIMAGING:   Recent imaging studies were reviewed.    LAB RESULTS:                          10.0   14.47 )-----------( 137      ( 01 Apr 2020 05:06 )             32.4       PT/INR - ( 30 Mar 2020 14:47 )   PT: 15.4 sec;   INR: 1.35 ratio         PTT - ( 30 Mar 2020 14:47 )  PTT:29.6 sec    04-01    137  |  102  |  87.0<H>  ----------------------------<  202<H>  6.6<HH>   |  17.0<L>  |  4.91<H>    Ca    8.3<L>      01 Apr 2020 05:06  Phos  6.7     04-01  Mg     2.5     04-01    TPro  5.8<L>  /  Alb  3.1<L>  /  TBili  0.8  /  DBili  x   /  AST  45<H>  /  ALT  22  /  AlkPhos  60  04-01          ABG - ( 01 Apr 2020 06:45 )  pH, Arterial: 7.14  pH, Blood: x     /  pCO2: 55    /  pO2: 182   / HCO3: 16    / Base Excess: -10.1 /  SaO2: 100                   -----------------------------------------------------------------------------------------------------------------------------------------------------------------------------------    PHYSICAL EXAM:  General: Calm, intubated  HEENT: MMM  Neuro:  -Mental status- No acute distress  -CN- PERRL 3mm, EOMI, tongue midline, face symmetric  + corneals/cough/gag  -Motor-  -Sensation-   -Coordination- unable to assess    CV: RRR  Pulm: Clear to auscultation  Abd: Soft, nontender, nondistended  Ext: No edema  Skin: warm, dry Chief complaint:   Patient is a 59y old  Male who presents with a chief complaint of shortness of breath (31 Mar 2020 16:08)    HPI:  59 yr old male with hypertension, diabetes mellitus, hyperlipidemia, morbid obesity, NOBLE on CPAP, DVT s/p IVC filter on anticoagulation, CKD stage 3 admitted with complaints of one week of fatigue, body aches, progressively worsening shortness of breath with a cough. Recently lost father in law. States his wife has similar symptoms and is in the ED with similar symptoms. He is not on home oxygen. Severely hypoxic in ED. No nausea, vomiting, abdominal pain, diarrhea. Follows nephrology. (30 Mar 2020 19:47)    OVERNIGHT EVENTS:  started on bicarb gtt    ROS: [ x]  Unable to assess due to mental status   All other systems negative    -----------------------------------------------------------------------------------------------------------------------------------------------------------------------------------  ICU Vital Signs Last 24 Hrs  T(C): 37.6 (01 Apr 2020 01:03), Max: 37.6 (01 Apr 2020 01:03)  T(F): 99.6 (01 Apr 2020 01:03), Max: 99.6 (01 Apr 2020 01:03)  HR: 59 (01 Apr 2020 08:38) (58 - 88)  BP: 142/62 (01 Apr 2020 08:27) (97/52 - 142/62)  BP(mean): 83 (01 Apr 2020 08:27) (68 - 83)  ABP: 179/65 (01 Apr 2020 08:27) (179/65 - 179/65)  ABP(mean): 89 (01 Apr 2020 08:27) (89 - 89)  RR: 30 (01 Apr 2020 08:27) (20 - 30)  SpO2: 100% (01 Apr 2020 08:38) (84% - 100%)      I&O's Summary    31 Mar 2020 07:01  -  01 Apr 2020 07:00  --------------------------------------------------------  IN: 3143.6 mL / OUT: 0 mL / NET: 3143.6 mL    01 Apr 2020 07:01  -  01 Apr 2020 08:46  --------------------------------------------------------  IN: 0 mL / OUT: 50 mL / NET: -50 mL        MEDICATIONS  (STANDING):  atorvastatin 20 milliGRAM(s) Oral at bedtime  chlorhexidine 0.12% Liquid 15 milliLiter(s) Oral Mucosa every 12 hours  chlorhexidine 2% Cloths 1 Application(s) Topical <User Schedule>  hydroxychloroquine 200 milliGRAM(s) Oral every 12 hours  methylPREDNISolone sodium succinate Injectable 125 milliGRAM(s) IV Push two times a day  midazolam Infusion 0.02 mG/kG/Hr (3.62 mL/Hr) IV Continuous <Continuous>  norepinephrine Infusion 0.05 MICROgram(s)/kG/Min (17 mL/Hr) IV Continuous <Continuous>  pantoprazole  Injectable 40 milliGRAM(s) IV Push daily  polyethylene glycol 3350 17 Gram(s) Oral at bedtime  propofol Infusion 10 MICROgram(s)/kG/Min (10.9 mL/Hr) IV Continuous <Continuous>  rivaroxaban 15 milliGRAM(s) Oral with dinner  sodium chloride 0.45% 1000 milliLiter(s) (100 mL/Hr) IV Continuous <Continuous>  thiamine Injectable 200 milliGRAM(s) IV Push <User Schedule>      RESPIRATORY:  Mode: AC/ CMV (Assist Control/ Continuous Mandatory Ventilation)  RR (machine): 30  TV (machine): 480  FiO2: 70  PEEP: 18  ITime: 0.6  MAP: 23  PIP: 34        NEUROIMAGING:   Recent imaging studies were reviewed.    LAB RESULTS:                          10.0   14.47 )-----------( 137      ( 01 Apr 2020 05:06 )             32.4       PT/INR - ( 30 Mar 2020 14:47 )   PT: 15.4 sec;   INR: 1.35 ratio         PTT - ( 30 Mar 2020 14:47 )  PTT:29.6 sec    04-01    137  |  102  |  87.0<H>  ----------------------------<  202<H>  6.6<HH>   |  17.0<L>  |  4.91<H>    Ca    8.3<L>      01 Apr 2020 05:06  Phos  6.7     04-01  Mg     2.5     04-01    TPro  5.8<L>  /  Alb  3.1<L>  /  TBili  0.8  /  DBili  x   /  AST  45<H>  /  ALT  22  /  AlkPhos  60  04-01          ABG - ( 01 Apr 2020 06:45 )  pH, Arterial: 7.14  pH, Blood: x     /  pCO2: 55    /  pO2: 182   / HCO3: 16    / Base Excess: -10.1 /  SaO2: 100                   -----------------------------------------------------------------------------------------------------------------------------------------------------------------------------------    PHYSICAL EXAM:  General: Calm, intubated  HEENT: MMM  Neuro:  -Mental status- No acute distress  -CN- PERRL 3mm, EOMI, tongue midline, face symmetric  + corneals/cough/gag  -Motor-  -Sensation-   -Coordination- unable to assess    CV: RRR  Pulm: Clear to auscultation  Abd: Soft, nontender, nondistended  Ext: No edema  Skin: warm, dry Chief complaint:   Patient is a 59y old  Male who presents with a chief complaint of shortness of breath (31 Mar 2020 16:08)    HPI:  59 yr old male with hypertension, diabetes mellitus, hyperlipidemia, morbid obesity, NOBLE on CPAP, DVT s/p IVC filter on anticoagulation, CKD stage 3 admitted with complaints of one week of fatigue, body aches, progressively worsening shortness of breath with a cough. Recently lost father in law. States his wife has similar symptoms and is in the ED with similar symptoms. He is not on home oxygen. Severely hypoxic in ED. No nausea, vomiting, abdominal pain, diarrhea. Follows nephrology. (30 Mar 2020 19:47)    OVERNIGHT EVENTS:  started on bicarb gtt    ROS: [ x]  Unable to assess due to mental status   All other systems negative    -----------------------------------------------------------------------------------------------------------------------------------------------------------------------------------  ICU Vital Signs Last 24 Hrs  T(C): 37.6 (01 Apr 2020 01:03), Max: 37.6 (01 Apr 2020 01:03)  T(F): 99.6 (01 Apr 2020 01:03), Max: 99.6 (01 Apr 2020 01:03)  HR: 59 (01 Apr 2020 08:38) (58 - 88)  BP: 142/62 (01 Apr 2020 08:27) (97/52 - 142/62)  BP(mean): 83 (01 Apr 2020 08:27) (68 - 83)  ABP: 179/65 (01 Apr 2020 08:27) (179/65 - 179/65)  ABP(mean): 89 (01 Apr 2020 08:27) (89 - 89)  RR: 30 (01 Apr 2020 08:27) (20 - 30)  SpO2: 100% (01 Apr 2020 08:38) (84% - 100%)      I&O's Summary    31 Mar 2020 07:01  -  01 Apr 2020 07:00  --------------------------------------------------------  IN: 3143.6 mL / OUT: 0 mL / NET: 3143.6 mL    01 Apr 2020 07:01  -  01 Apr 2020 08:46  --------------------------------------------------------  IN: 0 mL / OUT: 50 mL / NET: -50 mL        MEDICATIONS  (STANDING):  atorvastatin 20 milliGRAM(s) Oral at bedtime  chlorhexidine 0.12% Liquid 15 milliLiter(s) Oral Mucosa every 12 hours  chlorhexidine 2% Cloths 1 Application(s) Topical <User Schedule>  hydroxychloroquine 200 milliGRAM(s) Oral every 12 hours  methylPREDNISolone sodium succinate Injectable 125 milliGRAM(s) IV Push two times a day  midazolam Infusion 0.02 mG/kG/Hr (3.62 mL/Hr) IV Continuous <Continuous>  norepinephrine Infusion 0.05 MICROgram(s)/kG/Min (17 mL/Hr) IV Continuous <Continuous>  pantoprazole  Injectable 40 milliGRAM(s) IV Push daily  polyethylene glycol 3350 17 Gram(s) Oral at bedtime  propofol Infusion 10 MICROgram(s)/kG/Min (10.9 mL/Hr) IV Continuous <Continuous>  rivaroxaban 15 milliGRAM(s) Oral with dinner  sodium chloride 0.45% 1000 milliLiter(s) (100 mL/Hr) IV Continuous <Continuous>  thiamine Injectable 200 milliGRAM(s) IV Push <User Schedule>      RESPIRATORY:  Mode: AC/ CMV (Assist Control/ Continuous Mandatory Ventilation)  RR (machine): 30  TV (machine): 480  FiO2: 70  PEEP: 18  ITime: 0.6  MAP: 23  PIP: 34        NEUROIMAGING:   Recent imaging studies were reviewed.    LAB RESULTS:                          10.0   14.47 )-----------( 137      ( 01 Apr 2020 05:06 )             32.4       PT/INR - ( 30 Mar 2020 14:47 )   PT: 15.4 sec;   INR: 1.35 ratio         PTT - ( 30 Mar 2020 14:47 )  PTT:29.6 sec    04-01    137  |  102  |  87.0<H>  ----------------------------<  202<H>  6.6<HH>   |  17.0<L>  |  4.91<H>    Ca    8.3<L>      01 Apr 2020 05:06  Phos  6.7     04-01  Mg     2.5     04-01    TPro  5.8<L>  /  Alb  3.1<L>  /  TBili  0.8  /  DBili  x   /  AST  45<H>  /  ALT  22  /  AlkPhos  60  04-01          ABG - ( 01 Apr 2020 06:45 )  pH, Arterial: 7.14  pH, Blood: x     /  pCO2: 55    /  pO2: 182   / HCO3: 16    / Base Excess: -10.1 /  SaO2: 100                   -----------------------------------------------------------------------------------------------------------------------------------------------------------------------------------    PHYSICAL EXAM:  General: Calm, intubated, sedated  obese  HEENT: MMM  Neuro:  -Mental status- No acute distress  -CN- PERRL 3mm, EOMI, tongue midline, face symmetric  + corneals/cough/gag    CV: RRR  Pulm: Clear to auscultation  Abd: Soft, nontender, nondistended  Ext: No edema  Skin: warm, dry

## 2020-04-01 NOTE — PROCEDURE NOTE - NSINDICATIONS_GEN_A_CORE
critical illness
respiratory distress
critical illness/dialysis/CRRT
arterial puncture to obtain ABG's

## 2020-04-01 NOTE — DIETITIAN INITIAL EVALUATION ADULT. - OTHER INFO
59 yr old male with hypertension, diabetes mellitus, hyperlipidemia, morbid obesity, NOBLE on CPAP, DVT s/p IVC filter on anticoagulation, CKD stage 3 admitted with complaints of one week of fatigue, body aches, progressively worsening shortness of breath with a cough now with hypoxic resp failure likely secondary to COVID pneumonia; intubated. Generalized 2+ edema noted. Last documented BM 3/31. Pt receiving propofol, 764.8ml daily total per I&Os providing an additional 841kcal. 59 yr old male with hypertension, diabetes mellitus, hyperlipidemia, morbid obesity, NOBLE on CPAP, DVT s/p IVC filter on anticoagulation, CKD stage 3 admitted with complaints of one week of fatigue, body aches, progressively worsening shortness of breath with a cough now with hypoxic resp failure likely secondary to COVID pneumonia; intubated. Generalized 2+ edema noted. Last documented BM 3/31. Pt receiving propofol, 764.8ml daily total per I&Os providing an additional 841kcal.     Current diet order: Glucerna 1.5 at goal rate of 60 ml/hr (x20 hrs) providing 1200 ml, 1800 kcal, 99g protein, 911 ml free water. Additional free water per MD discretion.

## 2020-04-01 NOTE — PROGRESS NOTE ADULT - ASSESSMENT
59 yr old male with hypertension, diabetes mellitus, hyperlipidemia, morbid obesity, NOBLE on CPAP, DVT s/p IVC filter on anticoagulation, CKD stage 3 admitted with complaints of one week of fatigue, body aches, progressively worsening shortness of breath with a cough    NEUROLOGIC  Mental status: sedated  Sedation:  [] propofol   [] midazolam      RESPIRATORY  [] Acute respiratory failure with: [x] hypoxemia / [] hypercapnia  [x] ARDS  Ideal body weight: 72kg  Ventilation settings: 28/420/70%/20    CARDIOVASCULAR  Shock: [x] septic   [x] norepinephrine __ mcg/kg/min  MAP>65    GASTROINTESTINAL:   [] Tube feeds:  start jevity  [] GI prophylaxis: [] famotidine [x] pantoprazole  [] Bowel regimen [] senna [x] Miralax     RENAL  [x] GODWIN on CKD (baseline Cr: _3.0_)   goal I/O even to -1L    ENDOCRINE  [] insulin regimen: [] infusion, [] glargine, [] NPH, [x] sliding scale  [x] stress-dose steroids  Goal FSG < 180    HEMATOLOGY  DVT  cont rivaroxaban      INFECTIOUS DISEASES   Rule out: SARS-CoV-2 infection/COVID-19  Procalcitonin: trend  afebrile  follow up COVID   [x] Hydroxycholoroquine (day)      ADVANCED DIRECTIVES:  [x] Full code [] DNR [] DNI [] Comfort measures    SOCIAL:   [] Family updated: , contact:     DISPOSITION:   [] ICU 59 yr old male with hypertension, diabetes mellitus, hyperlipidemia, morbid obesity, NOBLE on CPAP, DVT s/p IVC filter on anticoagulation, CKD stage 3 admitted with complaints of one week of fatigue, body aches, progressively worsening shortness of breath with a cough    NEUROLOGIC  Mental status: sedated  Sedation:  [] propofol   [] midazolam      RESPIRATORY  [] Acute respiratory failure with: [x] hypoxemia / [] hypercapnia  [x] ARDS  Ideal body weight: 72kg  Ventilation settings: 30/480/70%/18    CARDIOVASCULAR  Shock: [x] septic   [x] norepinephrine __ mcg/kg/min  MAP>65    GASTROINTESTINAL:   [] Tube feeds:  start jevity  [] GI prophylaxis: [] famotidine [x] pantoprazole  [] Bowel regimen [] senna [x] Miralax     RENAL  [x] GODWIN on CKD (baseline Cr: _3.0_)   goal I/O even to -1L  consult renal- needs HD  bicarb gtt    ENDOCRINE  [] insulin regimen: [] infusion, [] glargine, [] NPH, [x] sliding scale  [x] stress-dose steroids  Goal FSG < 180    HEMATOLOGY  DVT  cont rivaroxaban      INFECTIOUS DISEASES   Rule out: SARS-CoV-2 infection/COVID-19  Procalcitonin: trend  afebrile  COVID +  [x] Hydroxycholoroquine (day)      ADVANCED DIRECTIVES:  [x] Full code [] DNR [] DNI [] Comfort measures    SOCIAL:   [] Family updated: , contact:     DISPOSITION:   [] ICU 59 yr old male with hypertension, diabetes mellitus, hyperlipidemia, morbid obesity, NOBLE on CPAP, DVT s/p IVC filter on anticoagulation, CKD stage 3 admitted with complaints of one week of fatigue, body aches, progressively worsening shortness of breath with a cough    NEUROLOGIC  Mental status: sedated  Sedation:  [] propofol   [] midazolam      RESPIRATORY  [] Acute respiratory failure with: [x] hypoxemia / [] hypercapnia  [x] ARDS  Ideal body weight: 72kg  Ventilation settings: 30/480/70%/18    CARDIOVASCULAR  Shock: [x] septic   [x] norepinephrine gtt  MAP>65    GASTROINTESTINAL:   Tube feeds:  nepro  GI prophylaxis: [] famotidine [x] pantoprazole  Bowel regimen [] senna [x] Miralax     RENAL  [x] GODWIN on CKD (baseline Cr: _3.0_)   goal I/O even to -1L  consult renal- needs HD  bicarb gtt  oliguric- urology placed quijano due to body habitus    ENDOCRINE  [] insulin regimen: [] infusion, [] glargine, [] NPH, [x] sliding scale  [x] stress-dose steroids  Goal FSG < 180    HEMATOLOGY  DVT  cont rivaroxaban      INFECTIOUS DISEASES   Rule out: SARS-CoV-2 infection/COVID-19  Procalcitonin: trend  afebrile  COVID +  [x] Hydroxycholoroquine (day)  solumedrol 3-5 days    ADVANCED DIRECTIVES:  [x] Full code [] DNR [] DNI [] Comfort measures    SOCIAL:   [] Family updated: , contact:     DISPOSITION:   [] ICU

## 2020-04-02 NOTE — PROGRESS NOTE ADULT - ASSESSMENT
59 yr old male with hypertension, diabetes mellitus, hyperlipidemia, morbid obesity, NOBLE on CPAP, DVT s/p IVC filter on anticoagulation, CKD stage 3 admitted with complaints of one week of fatigue, body aches, progressively worsening shortness of breath with a cough. Recently lost father in law. States his wife has similar symptoms and is in the ED with similar symptoms. He is not on home oxygen. Severely hypoxic in ED. No nausea, vomiting, abdominal pain, diarrhea. Follows nephrology. (30 Mar 2020 19:47)    patient was intubated on 3/31/2020. Now under ICU care in [address] [place] [address].   COVID 19 testing positive.  started on Hydroxychloroquine      Impression:  COVID-19 Pneumonia  multifocal Pneumonia  acute hypoxic respiratory failuire  s/p intubation    Plan:   Continue supportive care measures  - continue mechanical ventilation  - continue steroids course as per ICU team    continue course of Hydroxychloroquine  - will need to monitor for  QT prolongation    Procalc levels stable, will watch  CRP down going    - trend CBC with diff, CMP with LFT's  - trend Inflammatory markers (ESR, CRP, Ferritin, LDH,  procalcitonin)  q48h.  D dimer, lactate, troponin, CK, PT/PTT x 1      Will follow with you.

## 2020-04-02 NOTE — PROGRESS NOTE ADULT - SUBJECTIVE AND OBJECTIVE BOX
Chief complaint:   Patient is a 59y old  Male who presents with a chief complaint of shortness of breath (01 Apr 2020 16:25)    HPI:  59 yr old male with hypertension, diabetes mellitus, hyperlipidemia, morbid obesity, NOBLE on CPAP, DVT s/p IVC filter on anticoagulation, CKD stage 3 admitted with complaints of one week of fatigue, body aches, progressively worsening shortness of breath with a cough. Recently lost father in law. States his wife has similar symptoms and is in the ED with similar symptoms. He is not on home oxygen. Severely hypoxic in ED. No nausea, vomiting, abdominal pain, diarrhea. Follows nephrology. (30 Mar 2020 19:47)        OVERNIGHT EVENTS:  HD- 1L off    ROS: [ ]  Unable to assess due to mental status   All other systems negative    -----------------------------------------------------------------------------------------------------------------------------------------------------------------------------------  ICU Vital Signs Last 24 Hrs  T(C): 37.7 (01 Apr 2020 17:40), Max: 37.7 (01 Apr 2020 17:40)  T(F): 99.8 (01 Apr 2020 17:40), Max: 99.8 (01 Apr 2020 17:40)  HR: 58 (02 Apr 2020 04:08) (58 - 65)  BP: 127/58 (01 Apr 2020 20:15) (127/58 - 145/64)  BP(mean): 77 (01 Apr 2020 17:22) (77 - 86)  ABP: 130/58 (01 Apr 2020 20:15) (130/58 - 180/67)  ABP(mean): 71 (01 Apr 2020 17:22) (71 - 90)  RR: 28 (01 Apr 2020 20:15) (28 - 30)  SpO2: 98% (02 Apr 2020 04:08) (96% - 100%)      I&O's Summary    01 Apr 2020 07:01  -  02 Apr 2020 07:00  --------------------------------------------------------  IN: 0 mL / OUT: 550 mL / NET: -550 mL        MEDICATIONS  (STANDING):  atorvastatin 20 milliGRAM(s) Oral at bedtime  chlorhexidine 0.12% Liquid 15 milliLiter(s) Oral Mucosa every 12 hours  chlorhexidine 2% Cloths 1 Application(s) Topical <User Schedule>  chlorhexidine 4% Liquid 1 Application(s) Topical <User Schedule>  dextrose 5%. 1000 milliLiter(s) (50 mL/Hr) IV Continuous <Continuous>  dextrose 50% Injectable 12.5 Gram(s) IV Push once  dextrose 50% Injectable 25 Gram(s) IV Push once  dextrose 50% Injectable 25 Gram(s) IV Push once  hydroxychloroquine 200 milliGRAM(s) Oral every 12 hours  insulin lispro (HumaLOG) corrective regimen sliding scale   SubCutaneous three times a day before meals  methylPREDNISolone sodium succinate Injectable 125 milliGRAM(s) IV Push two times a day  midazolam Infusion 0.02 mG/kG/Hr (3.62 mL/Hr) IV Continuous <Continuous>  norepinephrine Infusion 0.05 MICROgram(s)/kG/Min (17 mL/Hr) IV Continuous <Continuous>  pantoprazole  Injectable 40 milliGRAM(s) IV Push daily  polyethylene glycol 3350 17 Gram(s) Oral at bedtime  propofol Infusion 10 MICROgram(s)/kG/Min (10.9 mL/Hr) IV Continuous <Continuous>  rivaroxaban 15 milliGRAM(s) Oral with dinner  sodium chloride 0.45% 1000 milliLiter(s) (100 mL/Hr) IV Continuous <Continuous>      RESPIRATORY:  Mode: AC/ CMV (Assist Control/ Continuous Mandatory Ventilation)  RR (machine): 28  TV (machine): 480  FiO2: 50  PEEP: 18  ITime: 0.6  MAP: 25  PIP: 33        NEUROIMAGING:   Recent imaging studies were reviewed.    LAB RESULTS:                          9.8    12.52 )-----------( 140      ( 02 Apr 2020 05:33 )             29.6           04-02    138  |  100  |  87.0<H>  ----------------------------<  238<H>  5.5<H>   |  20.0<L>  |  4.88<H>    Ca    8.2<L>      02 Apr 2020 05:33  Phos  6.7     04-01  Mg     2.5     04-01    TPro  5.1<L>  /  Alb  2.7<L>  /  TBili  0.5  /  DBili  x   /  AST  45<H>  /  ALT  26  /  AlkPhos  52  04-02          ABG - ( 02 Apr 2020 05:24 )  pH, Arterial: 7.28  pH, Blood: x     /  pCO2: 46    /  pO2: 104   / HCO3: 20    / Base Excess: -5.1  /  SaO2: 98        -----------------------------------------------------------------------------------------------------------------------------------------------------------------------------------  PHYSICAL EXAM:  General: Calm, intubated, sedated for vent synchrony  HEENT: MMM  Neuro:  -Mental status- No acute distress  -CN- PERRL 3mm, EOMI, tongue midline, face symmetric  CV: RRR  Pulm: diminished Breath sounds bilaterally  Abd: Soft, nontender, nondistended  Ext: No edema  Skin: warm, dry Chief complaint:   Patient is a 59y old  Male who presents with a chief complaint of shortness of breath (01 Apr 2020 16:25)    HPI:  59 yr old male with hypertension, diabetes mellitus, hyperlipidemia, morbid obesity, NOBLE on CPAP, DVT s/p IVC filter on anticoagulation, CKD stage 3 admitted with complaints of one week of fatigue, body aches, progressively worsening shortness of breath with a cough. Recently lost father in law. States his wife has similar symptoms and is in the ED with similar symptoms. He is not on home oxygen. Severely hypoxic in ED. No nausea, vomiting, abdominal pain, diarrhea. Follows nephrology. (30 Mar 2020 19:47)    OVERNIGHT EVENTS:  HD- 1L off    ROS: [x ]  Unable to assess due to mental status   All other systems negative    -----------------------------------------------------------------------------------------------------------------------------------------------------------------------------------  ICU Vital Signs Last 24 Hrs  T(C): 37.7 (01 Apr 2020 17:40), Max: 37.7 (01 Apr 2020 17:40)  T(F): 99.8 (01 Apr 2020 17:40), Max: 99.8 (01 Apr 2020 17:40)  HR: 58 (02 Apr 2020 04:08) (58 - 65)  BP: 127/58 (01 Apr 2020 20:15) (127/58 - 145/64)  BP(mean): 77 (01 Apr 2020 17:22) (77 - 86)  ABP: 130/58 (01 Apr 2020 20:15) (130/58 - 180/67)  ABP(mean): 71 (01 Apr 2020 17:22) (71 - 90)  RR: 28 (01 Apr 2020 20:15) (28 - 30)  SpO2: 98% (02 Apr 2020 04:08) (96% - 100%)      I&O's Summary    01 Apr 2020 07:01  -  02 Apr 2020 07:00  --------------------------------------------------------  IN: 0 mL / OUT: 550 mL / NET: -550 mL        MEDICATIONS  (STANDING):  atorvastatin 20 milliGRAM(s) Oral at bedtime  chlorhexidine 0.12% Liquid 15 milliLiter(s) Oral Mucosa every 12 hours  chlorhexidine 2% Cloths 1 Application(s) Topical <User Schedule>  chlorhexidine 4% Liquid 1 Application(s) Topical <User Schedule>  dextrose 5%. 1000 milliLiter(s) (50 mL/Hr) IV Continuous <Continuous>  dextrose 50% Injectable 12.5 Gram(s) IV Push once  dextrose 50% Injectable 25 Gram(s) IV Push once  dextrose 50% Injectable 25 Gram(s) IV Push once  hydroxychloroquine 200 milliGRAM(s) Oral every 12 hours  insulin lispro (HumaLOG) corrective regimen sliding scale   SubCutaneous three times a day before meals  methylPREDNISolone sodium succinate Injectable 125 milliGRAM(s) IV Push two times a day  midazolam Infusion 0.02 mG/kG/Hr (3.62 mL/Hr) IV Continuous <Continuous>  norepinephrine Infusion 0.05 MICROgram(s)/kG/Min (17 mL/Hr) IV Continuous <Continuous>  pantoprazole  Injectable 40 milliGRAM(s) IV Push daily  polyethylene glycol 3350 17 Gram(s) Oral at bedtime  propofol Infusion 10 MICROgram(s)/kG/Min (10.9 mL/Hr) IV Continuous <Continuous>  rivaroxaban 15 milliGRAM(s) Oral with dinner  sodium chloride 0.45% 1000 milliLiter(s) (100 mL/Hr) IV Continuous <Continuous>      RESPIRATORY:  Mode: AC/ CMV (Assist Control/ Continuous Mandatory Ventilation)  RR (machine): 28  TV (machine): 480  FiO2: 50  PEEP: 18  ITime: 0.6  MAP: 25  PIP: 33        NEUROIMAGING:   Recent imaging studies were reviewed.    LAB RESULTS:                          9.8    12.52 )-----------( 140      ( 02 Apr 2020 05:33 )             29.6           04-02    138  |  100  |  87.0<H>  ----------------------------<  238<H>  5.5<H>   |  20.0<L>  |  4.88<H>    Ca    8.2<L>      02 Apr 2020 05:33  Phos  6.7     04-01  Mg     2.5     04-01    TPro  5.1<L>  /  Alb  2.7<L>  /  TBili  0.5  /  DBili  x   /  AST  45<H>  /  ALT  26  /  AlkPhos  52  04-02          ABG - ( 02 Apr 2020 05:24 )  pH, Arterial: 7.28  pH, Blood: x     /  pCO2: 46    /  pO2: 104   / HCO3: 20    / Base Excess: -5.1  /  SaO2: 98        -----------------------------------------------------------------------------------------------------------------------------------------------------------------------------------  PHYSICAL EXAM:  General: Calm, intubated, sedated for vent synchrony  HEENT: MMM  Neuro:  -Mental status- No acute distress  -CN- PERRL 3mm, EOMI, tongue midline, face symmetric  CV: RRR  Pulm: diminished Breath sounds bilaterally  Abd: Soft, nontender, nondistended  Ext: No edema  Skin: warm, dry

## 2020-04-02 NOTE — PROGRESS NOTE ADULT - SUBJECTIVE AND OBJECTIVE BOX
NEPHROLOGY INTERVAL HPI/OVERNIGHT EVENTS:    ID follow up noted   Net UO 1.2 L   + Xarelto  Remains on steroids         MEDICATIONS  (STANDING):  atorvastatin 20 milliGRAM(s) Oral at bedtime  chlorhexidine 0.12% Liquid 15 milliLiter(s) Oral Mucosa every 12 hours  chlorhexidine 2% Cloths 1 Application(s) Topical <User Schedule>  chlorhexidine 4% Liquid 1 Application(s) Topical <User Schedule>  dextrose 5%. 1000 milliLiter(s) (50 mL/Hr) IV Continuous <Continuous>  dextrose 50% Injectable 12.5 Gram(s) IV Push once  dextrose 50% Injectable 25 Gram(s) IV Push once  dextrose 50% Injectable 25 Gram(s) IV Push once  fentaNYL   Infusion. 0.5 MICROgram(s)/kG/Hr (9.05 mL/Hr) IV Continuous <Continuous>  hydroxychloroquine 200 milliGRAM(s) Oral every 12 hours  insulin lispro (HumaLOG) corrective regimen sliding scale   SubCutaneous three times a day before meals  methylPREDNISolone sodium succinate Injectable 125 milliGRAM(s) IV Push two times a day  midazolam Infusion 0.02 mG/kG/Hr (3.62 mL/Hr) IV Continuous <Continuous>  pantoprazole  Injectable 40 milliGRAM(s) IV Push daily  polyethylene glycol 3350 17 Gram(s) Oral at bedtime  rivaroxaban 15 milliGRAM(s) Oral with dinner  senna 2 Tablet(s) Oral at bedtime    MEDICATIONS  (PRN):  acetaminophen   Tablet .. 650 milliGRAM(s) Oral every 4 hours PRN Temp greater or equal to 38.5C (101.3F)  ALBUTerol    90 MICROgram(s) HFA Inhaler 2 Puff(s) Inhalation every 4 hours PRN Shortness of Breath and/or Wheezing  dextrose 40% Gel 15 Gram(s) Oral once PRN Blood Glucose LESS THAN 70 milliGRAM(s)/deciliter  glucagon  Injectable 1 milliGRAM(s) IntraMuscular once PRN Glucose LESS THAN 70 milligrams/deciliter  sodium chloride 0.9% lock flush 10 milliLiter(s) IV Push every 1 hour PRN Pre/post blood products, medications, blood draw, and to maintain line patency      Allergies    penicillin (Fever)    Intolerances        Vital Signs Last 24 Hrs  T(C): 36.8 (2020 12:00), Max: 37.7 (2020 17:40)  T(F): 98.2 (2020 12:00), Max: 99.8 (2020 17:40)  HR: 69 (2020 12:00) (58 - 69)  BP: 115/49 (2020 04:00) (115/49 - 135/57)  BP(mean): 65 (2020 04:00) (65 - 80)  RR: 28 (2020 04:00) (28 - 30)  SpO2: 95% (2020 12:00) (94% - 98%)  Daily     Daily   I&O's Detail    2020 07:01  -  2020 07:00  --------------------------------------------------------  IN:  Total IN: 0 mL    OUT:    Indwelling Catheter - Urethral: 1250 mL  Total OUT: 1250 mL    Total NET: -1250 mL        I&O's Summary    2020 07:01  -  2020 07:00  --------------------------------------------------------  IN: 0 mL / OUT: 1250 mL / NET: -1250 mL        PHYSICAL EXAM:  GENERAL: morbidly obese , Intubated   HEAD: intubated   NECK: Supple, No JVD, L IJ Reilly placed   CHEST/LUNG: EAE , Rhonchi . No wheeze   HEART: Regular rate and rhythm; No murmurs, rubs, or gallops  ABDOMEN: Soft, morbidly obese  EXTREMITIES:  + edema         LABS:                        9.8    12.52 )-----------( 140      ( 2020 05:33 )             29.6     04-02    138  |  100  |  87.0<H>  ----------------------------<  238<H>  5.5<H>   |  20.0<L>  |  4.88<H>    Ca    8.2<L>      2020 05:33  Phos  6.7     04-  Mg     2.5     04-    TPro  5.1<L>  /  Alb  2.7<L>  /  TBili  0.5  /  DBili  x   /  AST  45<H>  /  ALT  26  /  AlkPhos  52  04-02      Urinalysis Basic - ( 2020 03:24 )    Color: Yellow / Appearance: Clear / S.025 / pH: x  Gluc: x / Ketone: Negative  / Bili: Negative / Urobili: 1 mg/dL   Blood: x / Protein: 100 mg/dL / Nitrite: Negative   Leuk Esterase: Negative / RBC: 0-2 /HPF / WBC Negative   Sq Epi: x / Non Sq Epi: Occasional / Bacteria: x        ABG - ( 2020 05:24 )  pH, Arterial: 7.28  pH, Blood: x     /  pCO2: 46    /  pO2: 104   / HCO3: 20    / Base Excess: -5.1  /  SaO2: 98                    RADIOLOGY & ADDITIONAL TESTS:

## 2020-04-02 NOTE — PROGRESS NOTE ADULT - ASSESSMENT
59 yr old male with hypertension, diabetes mellitus, hyperlipidemia, morbid obesity, NOBLE on CPAP, DVT s/p IVC filter on anticoagulation, CKD stage 3 admitted with complaints of one week of fatigue, body aches, progressively worsening shortness of breath with a cough    NEUROLOGIC  Mental status: sedated  Sedation:  [] propofol   [] midazolam  transition propofol to fentanyl      RESPIRATORY  [] Acute respiratory failure with: [x] hypoxemia / [] hypercapnia  [x] ARDS  Ideal body weight: 72kg  Ventilation settings: 30/480/70%/18    CARDIOVASCULAR  Shock: [x] septic   [x] norepinephrine gtt  MAP>65    GASTROINTESTINAL:   Tube feeds:  nepro  GI prophylaxis: [] famotidine [x] pantoprazole  Bowel regimen [] senna [x] Miralax     RENAL  [x] GODWIN on CKD (baseline Cr: _3.0_)   goal I/O even to -1L  consult renal- 4/1 HD  bicarb gtt  oliguric- urology placed quijano due to body habitus    ENDOCRINE  [] insulin regimen: [] infusion, [] glargine, [] NPH, [x] sliding scale  [x] stress-dose steroids day 3  Goal FSG < 180    HEMATOLOGY  DVT  cont rivaroxaban      INFECTIOUS DISEASES   Rule out: SARS-CoV-2 infection/COVID-19  Procalcitonin: trend  afebrile  COVID +  [x] Hydroxycholoroquine (day)  solumedrol 3-5 days    ADVANCED DIRECTIVES:  [x] Full code [] DNR [] DNI [] Comfort measures    SOCIAL:   [] Family updated: , contact:     DISPOSITION:   [] ICU 59 yr old male with hypertension, diabetes mellitus, hyperlipidemia, morbid obesity, NOBLE on CPAP, DVT s/p IVC filter on anticoagulation, CKD stage 3 admitted with complaints of one week of fatigue, body aches, progressively worsening shortness of breath with a cough    NEUROLOGIC  Mental status: sedated  Sedation:  [] midazolam  transition propofol to fentanyl      RESPIRATORY  [] Acute respiratory failure with: [x] hypoxemia / [] hypercapnia  [x] ARDS  Ideal body weight: 72kg  Ventilation settings: 30/480/40%/18    CARDIOVASCULAR  Shock: [x] septic   off norepinephrine gtt  MAP>65    GASTROINTESTINAL:   Tube feeds:  nepro  GI prophylaxis: [] famotidine [x] pantoprazole  Bowel regimen [] senna [x] Miralax     RENAL  [x] GODWIN on CKD (baseline Cr: _3.0_)   goal I/O even to -1L  consult renal- 4/1 HD  stop bicarb gtt  oliguric- urology placed quijano due to body habitus    ENDOCRINE  [] insulin regimen: [] infusion, [] glargine, [] NPH, [x] sliding scale  [x] stress-dose steroids stop 4/4  Goal FSG < 180    HEMATOLOGY  DVT  cont rivaroxaban    INFECTIOUS DISEASES   Rule out: SARS-CoV-2 infection/COVID-19  Procalcitonin: trend  afebrile  COVID +  [x] Hydroxycholoroquine (day) stop 4/4  solumedrol 3-5 days    ADVANCED DIRECTIVES:  [x] Full code [] DNR [] DNI [] Comfort measures    SOCIAL:   [] Family updated: , contact:     DISPOSITION:   [] ICU

## 2020-04-02 NOTE — PHARMACOTHERAPY INTERVENTION NOTE - COMMENTS
Patient started on HD - Xarelto not recommended for patient's on dialysis, changed to Eliquis 5mg BID
Patient r/o COVID, received loading doses of hydroxychloroquine 400 mG q12h x 2 overnight. Ordered for hydroxychloroquine taper. Recommended entering 200 mG q12h x 8 doses only to complete therapy.

## 2020-04-02 NOTE — PROGRESS NOTE ADULT - SUBJECTIVE AND OBJECTIVE BOX
[facility]  INFECTIOUS DISEASES AND INTERNAL MEDICINE at [place]  =======================================================  Miguel Dee MD  Diplomates American Board of Internal Medicine and Infectious Diseases  Telephone [phone]  Fax            [phone]  =======================================================    [facility]-[id number]  FRENCH DEY   followup: COVID 19 PNA  remains intubated.         =======================================================  REVIEW OF SYSTEMS:  Limited due to medical condition    =======================================================  Allergies  penicillin (Fever)   =======================================================  Physical Exam:  ============  (see vitals section below)    General:  sedated.; OBESE  Eye: Pupils are equal, round and reactive to light, Extraocular movements are intact, Normal conjunctiva.  HENT: Normocephalic, dry OM, ET tube in place  Neck: Supple, No lymphadenopathy.  Respiratory: Lungs POOR air entry  Cardiovascular: Normal rate, Regular rhythm,   Gastrointestinal: Soft, Non-distended, Normal bowel sounds.  Genitourinary:  SAWYER   Lymphatics: No lymphadenopathy neck,   Musculoskeletal: unable to assess  Integumentary: no rash  Neurologic: sedated       =======================================================   Vitals:  ============  T(F): 97.5 (02 Apr 2020 08:30), Max: 99.8 (01 Apr 2020 17:40)  HR: 63 (02 Apr 2020 08:53)  BP: 115/49 (02 Apr 2020 04:00)  RR: 28 (02 Apr 2020 04:00)  SpO2: 97% (02 Apr 2020 08:53) (94% - 100%)  temp max in last 48H T(F): , Max: 99.8 (04-01-20 @ 17:40)    =======================================================  Current Antibiotics:  hydroxychloroquine 200 milliGRAM(s) Oral every 12 hours    Other medications:  atorvastatin 20 milliGRAM(s) Oral at bedtime  chlorhexidine 0.12% Liquid 15 milliLiter(s) Oral Mucosa every 12 hours  chlorhexidine 2% Cloths 1 Application(s) Topical <User Schedule>  chlorhexidine 4% Liquid 1 Application(s) Topical <User Schedule>  dextrose 5%. 1000 milliLiter(s) IV Continuous <Continuous>  dextrose 50% Injectable 12.5 Gram(s) IV Push once  dextrose 50% Injectable 25 Gram(s) IV Push once  dextrose 50% Injectable 25 Gram(s) IV Push once  insulin lispro (HumaLOG) corrective regimen sliding scale   SubCutaneous three times a day before meals  methylPREDNISolone sodium succinate Injectable 125 milliGRAM(s) IV Push two times a day  midazolam Infusion 0.02 mG/kG/Hr IV Continuous <Continuous>  norepinephrine Infusion 0.05 MICROgram(s)/kG/Min IV Continuous <Continuous>  pantoprazole  Injectable 40 milliGRAM(s) IV Push daily  polyethylene glycol 3350 17 Gram(s) Oral at bedtime  propofol Infusion 10 MICROgram(s)/kG/Min IV Continuous <Continuous>  rivaroxaban 15 milliGRAM(s) Oral with dinner  sodium chloride 0.45% 1000 milliLiter(s) IV Continuous <Continuous>      =======================================================  Labs:                        9.8    12.52 )-----------( 140      ( 02 Apr 2020 05:33 )             29.6       WBC Count: 12.52 K/uL (04-02-20 @ 05:33)  WBC Count: 14.47 K/uL (04-01-20 @ 05:06)  WBC Count: 11.28 K/uL (03-31-20 @ 12:28)  WBC Count: 10.67 K/uL (03-30-20 @ 14:47)      04-02    138  |  100  |  87.0<H>  ----------------------------<  238<H>  5.5<H>   |  20.0<L>  |  4.88<H>    Ca    8.2<L>      02 Apr 2020 05:33  Phos  6.7     04-01  Mg     2.5     04-01    TPro  5.1<L>  /  Alb  2.7<L>  /  TBili  0.5  /  DBili  x   /  AST  45<H>  /  ALT  26  /  AlkPhos  52  04-02      Culture - Urine (collected 04-01-20 @ 08:47)  Source: .Urine  Final Report (04-02-20 @ 08:21):    No growth    Culture - Blood (collected 03-30-20 @ 15:42)  Source: .Blood    Culture - Blood (collected 03-30-20 @ 15:39)  Source: .Blood      Creatinine, Serum: 4.88 mg/dL (04-02-20 @ 05:33)  Creatinine, Serum: 5.46 mg/dL (04-01-20 @ 20:54)  Creatinine, Serum: 4.91 mg/dL (04-01-20 @ 05:06)  Creatinine, Serum: 3.96 mg/dL (03-31-20 @ 12:28)  Creatinine, Serum: 3.53 mg/dL (03-30-20 @ 14:47)    C-Reactive Protein, Serum: 5.92 mg/dL (04-02-20 @ 05:33)  C-Reactive Protein, Serum: 9.95 mg/dL (04-01-20 @ 05:06)  C-Reactive Protein, Serum: 7.17 mg/dL (03-30-20 @ 14:47)    Ferritin, Serum: 1183 ng/mL (04-01-20 @ 05:06)  Ferritin, Serum: 998 ng/mL (03-30-20 @ 14:47)    Sedimentation Rate, Erythrocyte: 41 mm/hr (03-30-20 @ 14:47)  \   Procalcitonin, Serum: 0.60 ng/mL (04-01-20 @ 05:06)  Procalcitonin, Serum: 0.34 ng/mL (03-30-20 @ 14:47)      COVID-19 PCR: Detected (03-30-20 @ 20:42)

## 2020-04-02 NOTE — PROGRESS NOTE ADULT - ASSESSMENT
GODWIN on CKD   Covid 19 (+)   Resp failure   SIRS   Progressive component of met acidosis and hyperkalemia   since hemodynamics stable , will arrange for emergent bedside HD , heparin free , see orders   ROMY Grover placed by Vascular , post procedure CXR , OK to use   Informed / witnessed telephone consent obtained from the patient's daughter Jessa , in [place] , and placed in chart   Unable to get a hold of pt's wife ( sick with Covid 19 also )   Renal imaging when more stable     Will arrange second HD again today , see orders

## 2020-04-03 NOTE — PROGRESS NOTE ADULT - ASSESSMENT
59 yr old male with hypertension, diabetes mellitus, hyperlipidemia, morbid obesity, NOBLE on CPAP, DVT s/p IVC filter on anticoagulation, CKD stage 3 admitted with complaints of one week of fatigue, body aches, progressively worsening shortness of breath with a cough. Recently lost father in law. States his wife has similar symptoms and is in the ED with similar symptoms. He is not on home oxygen. Severely hypoxic in ED. No nausea, vomiting, abdominal pain, diarrhea. Follows nephrology. (30 Mar 2020 19:47)    patient was intubated on 3/31/2020. Now under ICU care in [address] [place] [address].   COVID 19 testing positive.  started on Hydroxychloroquine      Impression:  COVID-19 Pneumonia  multifocal Pneumonia  acute hypoxic respiratory failuire  s/p intubation    Plan:   Continue supportive care measures  - continue mechanical ventilation  - continue steroids course as per ICU team    continue course of Hydroxychloroquine  - will need to monitor for  QT prolongation    Procalc levels stable, will watch  CRP down going    - trend CBC with diff, CMP with LFT's  - trend Inflammatory markers (ESR, CRP, Ferritin, LDH,  procalcitonin)  q48h.  D dimer, lactate, troponin, CK, PT/PTT x 1      Will follow with you.

## 2020-04-03 NOTE — PROGRESS NOTE ADULT - ASSESSMENT
59 yr old male with hypertension, diabetes mellitus, hyperlipidemia, morbid obesity, NOBLE on CPAP, DVT s/p IVC filter on anticoagulation, CKD stage 3 admitted with complaints of one week of fatigue, body aches, progressively worsening shortness of breath with a cough    NEUROLOGIC  Mental status: sedated  Sedation:  [] midazolam  transition propofol to fentanyl      RESPIRATORY  [] Acute respiratory failure with: [x] hypoxemia / [] hypercapnia  [x] ARDS  Ideal body weight: 72kg  Ventilation settings: 30/480/40%/18    CARDIOVASCULAR  Shock: [x] septic   off norepinephrine gtt  MAP>65    GASTROINTESTINAL:   Tube feeds:  nepro  GI prophylaxis: [] famotidine [x] pantoprazole  Bowel regimen [] senna [x] Miralax     RENAL  [x] GODWIN on CKD (baseline Cr: _3.0_)   goal I/O even to -1L  consult renal- 4/1 HD  stop bicarb gtt  oliguric- urology placed quijano due to body habitus    ENDOCRINE  [] insulin regimen: [] infusion, [] glargine, [] NPH, [x] sliding scale  [x] stress-dose steroids stop 4/4  Goal FSG < 180    HEMATOLOGY  DVT  cont rivaroxaban    INFECTIOUS DISEASES   Rule out: SARS-CoV-2 infection/COVID-19  Procalcitonin: trend  afebrile  COVID +  [x] Hydroxycholoroquine (day) stop 4/4  solumedrol 3-5 days    ADVANCED DIRECTIVES:  [x] Full code [] DNR [] DNI [] Comfort measures    SOCIAL:   [] Family updated: , contact:     DISPOSITION:   [] ICU 59 yr old male with hypertension, diabetes mellitus, hyperlipidemia, morbid obesity, NOBLE on CPAP, DVT s/p IVC filter on anticoagulation, CKD stage 3 admitted with complaints of one week of fatigue, body aches, progressively worsening shortness of breath with a cough    NEUROLOGIC  Mental status: sedated  Sedation:  midazolam and fentanyl    RESPIRATORY  [] Acute respiratory failure with: [x] hypoxemia / [] hypercapnia  ARDS  Ideal body weight: 72kg  Ventilation settings: 30/480/40%/18    CARDIOVASCULAR  Shock: [x] septic   off norepinephrine gtt  MAP>65    GASTROINTESTINAL:   Tube feeds:  nepro  GI prophylaxis: [] famotidine [x] pantoprazole  Bowel regimen [x] senna [x] Miralax     RENAL  [x] GODWIN on CKD (baseline Cr: _3.0_)   goal I/O even to -1L  consult renal- 4/1 HD daily  oliguric- urology placed quijano due to body habitus    ENDOCRINE  [] insulin regimen: [] infusion, [] glargine, [] NPH, [x] sliding scale  [x] stress-dose steroids stop 4/4  Goal FSG < 180    HEMATOLOGY  DVT  changed rivaroxaban to apixaban due to HD    INFECTIOUS DISEASES   Rule out: SARS-CoV-2 infection/COVID-19  Procalcitonin: trend  afebrile  COVID +  [x] Hydroxycholoroquine (day) stop 4/4  solumedrol stop 4/4    ADVANCED DIRECTIVES:  [x] Full code [] DNR [] DNI [] Comfort measures    DISPOSITION:   [] ICU 59 yr old male with hypertension, diabetes mellitus, hyperlipidemia, morbid obesity, NOBLE on CPAP, DVT s/p IVC filter on anticoagulation, CKD stage 3 admitted with complaints of one week of fatigue, body aches, progressively worsening shortness of breath with a cough    NEUROLOGIC  Mental status: sedated  Sedation:  midazolam and fentanyl    RESPIRATORY  [] Acute respiratory failure with: [x] hypoxemia / [] hypercapnia  ARDS  Ideal body weight: 72kg  Ventilation settings: 30/480/40%/18    CARDIOVASCULAR  Shock: [x] septic   off norepinephrine gtt  MAP>65    GASTROINTESTINAL:   Tube feeds:  nepro  GI prophylaxis: [] famotidine [x] pantoprazole  Bowel regimen [x] senna [x] Miralax     RENAL  [x] GODWIN on CKD (baseline Cr: _3.0_)   goal I/O even to -1L  consult renal- 4/1 HD daily  start phos binders  oliguric- urology placed quijano due to body habitus    ENDOCRINE  [] insulin regimen: [] infusion, [] glargine, [] NPH, [x] sliding scale  [x] stress-dose steroids stop 4/4  Goal FSG < 180    HEMATOLOGY  DVT  changed rivaroxaban to apixaban due to HD    INFECTIOUS DISEASES   Rule out: SARS-CoV-2 infection/COVID-19  Procalcitonin: trend  afebrile  COVID +  [x] Hydroxycholoroquine (day) stop 4/4  solumedrol stop 4/4    ADVANCED DIRECTIVES:  [x] Full code [] DNR [] DNI [] Comfort measures    DISPOSITION:   [] ICU

## 2020-04-03 NOTE — PROGRESS NOTE ADULT - SUBJECTIVE AND OBJECTIVE BOX
NEPHROLOGY INTERVAL HPI/OVERNIGHT EVENTS:  pt essentially the same  remains vented   tolerated HD yesterday    MEDICATIONS  (STANDING):  apixaban 5 milliGRAM(s) Oral every 12 hours  atorvastatin 20 milliGRAM(s) Oral at bedtime  chlorhexidine 0.12% Liquid 15 milliLiter(s) Oral Mucosa every 12 hours  chlorhexidine 2% Cloths 1 Application(s) Topical <User Schedule>  chlorhexidine 4% Liquid 1 Application(s) Topical <User Schedule>  dextrose 5%. 1000 milliLiter(s) (50 mL/Hr) IV Continuous <Continuous>  dextrose 50% Injectable 12.5 Gram(s) IV Push once  dextrose 50% Injectable 25 Gram(s) IV Push once  dextrose 50% Injectable 25 Gram(s) IV Push once  fentaNYL   Infusion. 0.5 MICROgram(s)/kG/Hr (9.05 mL/Hr) IV Continuous <Continuous>  hydroxychloroquine 200 milliGRAM(s) Oral every 12 hours  insulin lispro (HumaLOG) corrective regimen sliding scale   SubCutaneous every 6 hours  methylPREDNISolone sodium succinate Injectable 125 milliGRAM(s) IV Push two times a day  midazolam Infusion 0.02 mG/kG/Hr (3.62 mL/Hr) IV Continuous <Continuous>  pantoprazole  Injectable 40 milliGRAM(s) IV Push daily  polyethylene glycol 3350 17 Gram(s) Oral at bedtime  senna 2 Tablet(s) Oral at bedtime    MEDICATIONS  (PRN):  acetaminophen   Tablet .. 650 milliGRAM(s) Oral every 4 hours PRN Temp greater or equal to 38.5C (101.3F)  ALBUTerol    90 MICROgram(s) HFA Inhaler 2 Puff(s) Inhalation every 4 hours PRN Shortness of Breath and/or Wheezing  dextrose 40% Gel 15 Gram(s) Oral once PRN Blood Glucose LESS THAN 70 milliGRAM(s)/deciliter  glucagon  Injectable 1 milliGRAM(s) IntraMuscular once PRN Glucose LESS THAN 70 milligrams/deciliter  sodium chloride 0.9% lock flush 10 milliLiter(s) IV Push every 1 hour PRN Pre/post blood products, medications, blood draw, and to maintain line patency      Allergies    penicillin (Fever)          Vital Signs Last 24 Hrs  T(C): 36.7 (03 Apr 2020 08:23), Max: 36.8 (02 Apr 2020 12:00)  T(F): 98 (03 Apr 2020 08:23), Max: 98.2 (02 Apr 2020 12:00)  HR: 58 (03 Apr 2020 09:00) (58 - 69)  BP: 109/53 (02 Apr 2020 21:09) (109/53 - 109/53)  BP(mean): --  RR: 28 (03 Apr 2020 08:23) (17 - 28)  SpO2: 95% (03 Apr 2020 09:00) (92% - 97%)    PHYSICAL EXAM:  GENERAL: Vented; obese  HEAD: intubated   NECK: Supple, No JVD, L IJ Reilly placed   CHEST/LUNG: +rhonchi   HEART: Regular rate and rhythm; No rub  ABDOMEN: Soft, nontender +BS  EXTREMITIES:  + edema     LABS:                        10.2   14.06 )-----------( 151      ( 03 Apr 2020 05:18 )             31.5     04-03    137  |  96<L>  |  100.0<H>  ----------------------------<  266<H>  6.2<HH>   |  21.0<L>  |  5.21<H>    Ca    7.7<L>      03 Apr 2020 05:18  Phos  8.1     04-03  Mg     2.7     04-03    TPro  5.6<L>  /  Alb  2.9<L>  /  TBili  0.4  /  DBili  x   /  AST  32  /  ALT  27  /  AlkPhos  55  04-03        Magnesium, Serum: 2.7 mg/dL (04-03 @ 05:18)  Phosphorus Level, Serum: 8.1 mg/dL (04-03 @ 05:18)      RADIOLOGY & ADDITIONAL TESTS:  < from: Xray Chest 1 View- PORTABLE-Urgent (04.03.20 @ 00:19) >   EXAM:  XR CHEST PORTABLE URGENT 1V                          PROCEDURE DATE:  04/03/2020          INTERPRETATION:  CLINICAL STATEMENT: CLINICAL STATEMENT: Evaluate NG tube position    TECHNIQUE: AP view of the chest.    COMPARISON: 4/1/2020     Findings:    NG tube is further advanced with its tip beyond the field of view of this exam over of bowel gas. Right IJ line and endotracheal tube are in place. Extensive bilateral parenchymal opacities are again noted.      IMPRESSION:    NG tube past epigastrium, likely in the distal stomach or proximal duodenum.    No change of bilateral parenchymal infiltration.            TECHNIQUE: AP view of the chest.    COMPARISON:    The cardiomediastinal silhouette is normal and the janet are not enlarged. The trachea is midline. There is no focal infiltrate or pleural effusion. The osseous structures are intact.    IMPRESSION:    Normal chest    < end of copied text >

## 2020-04-03 NOTE — PROGRESS NOTE ADULT - SUBJECTIVE AND OBJECTIVE BOX
[facility]  INFECTIOUS DISEASES AND INTERNAL MEDICINE at [place]  =======================================================  Miguel Dee MD  Diplomates American Board of Internal Medicine and Infectious Diseases  Telephone [phone]  Fax            [phone]  =======================================================    [facility]-[id number]  FRENCH DEY   followup: COVID 19 PNA  remains intubated.      =======================================================  REVIEW OF SYSTEMS:  Limited due to medical condition    =======================================================  Allergies  penicillin (Fever)    =======================================================  Physical Exam:  ============  (see vitals section below)    General:  sedated.; OBESE  Eye: Pupils are equal, round and reactive to light, Extraocular movements are intact, Normal conjunctiva.  HENT: Normocephalic, dry OM, ET tube in place  Neck: Supple, No lymphadenopathy.  Respiratory: Lungs POOR air entry  Cardiovascular: Normal rate, Regular rhythm,   Gastrointestinal: Soft, Non-distended, Normal bowel sounds.  Genitourinary:  SAWYER   Lymphatics: No lymphadenopathy neck,   Musculoskeletal: unable to assess  Integumentary: no rash  Neurologic: sedated     =======================================================   Vitals:  ============  T(F): 98 (03 Apr 2020 08:23), Max: 98 (02 Apr 2020 18:49)  HR: 58 (03 Apr 2020 11:34)  BP: 109/53 (02 Apr 2020 21:09)  RR: 28 (03 Apr 2020 11:34)  SpO2: 94% (03 Apr 2020 11:34) (92% - 97%)  temp max in last 48H T(F): , Max: 99.8 (04-01-20 @ 17:40)    =======================================================  Current Antibiotics:  hydroxychloroquine 200 milliGRAM(s) Oral every 12 hours    Other medications:  apixaban 5 milliGRAM(s) Oral every 12 hours  atorvastatin 20 milliGRAM(s) Oral at bedtime  chlorhexidine 0.12% Liquid 15 milliLiter(s) Oral Mucosa every 12 hours  chlorhexidine 2% Cloths 1 Application(s) Topical <User Schedule>  chlorhexidine 4% Liquid 1 Application(s) Topical <User Schedule>  dextrose 5%. 1000 milliLiter(s) IV Continuous <Continuous>  dextrose 50% Injectable 12.5 Gram(s) IV Push once  dextrose 50% Injectable 25 Gram(s) IV Push once  dextrose 50% Injectable 25 Gram(s) IV Push once  fentaNYL   Infusion. 0.5 MICROgram(s)/kG/Hr IV Continuous <Continuous>  insulin lispro (HumaLOG) corrective regimen sliding scale   SubCutaneous every 6 hours  methylPREDNISolone sodium succinate Injectable 125 milliGRAM(s) IV Push two times a day  midazolam Infusion 0.02 mG/kG/Hr IV Continuous <Continuous>  pantoprazole  Injectable 40 milliGRAM(s) IV Push daily  polyethylene glycol 3350 17 Gram(s) Oral at bedtime  senna 2 Tablet(s) Oral at bedtime      =======================================================  Labs:                        10.2   14.06 )-----------( 151      ( 03 Apr 2020 05:18 )             31.5       WBC Count: 14.06 K/uL (04-03-20 @ 05:18)  WBC Count: 12.52 K/uL (04-02-20 @ 05:33)  WBC Count: 14.47 K/uL (04-01-20 @ 05:06)  WBC Count: 11.28 K/uL (03-31-20 @ 12:28)  WBC Count: 10.67 K/uL (03-30-20 @ 14:47)      04-03    137  |  96<L>  |  100.0<H>  ----------------------------<  266<H>  6.2<HH>   |  21.0<L>  |  5.21<H>    Ca    7.7<L>      03 Apr 2020 05:18  Phos  8.1     04-03  Mg     2.7     04-03    TPro  5.6<L>  /  Alb  2.9<L>  /  TBili  0.4  /  DBili  x   /  AST  32  /  ALT  27  /  AlkPhos  55  04-03      Culture - Urine (collected 04-01-20 @ 08:47)  Source: .Urine  Final Report (04-02-20 @ 08:21):    No growth    Culture - Blood (collected 03-30-20 @ 15:42)  Source: .Blood    Culture - Blood (collected 03-30-20 @ 15:39)  Source: .Blood      Creatinine, Serum: 5.21 mg/dL (04-03-20 @ 05:18)  Creatinine, Serum: 4.88 mg/dL (04-02-20 @ 05:33)  Creatinine, Serum: 5.46 mg/dL (04-01-20 @ 20:54)  Creatinine, Serum: 4.91 mg/dL (04-01-20 @ 05:06)  Creatinine, Serum: 3.96 mg/dL (03-31-20 @ 12:28)  Creatinine, Serum: 3.53 mg/dL (03-30-20 @ 14:47)    C-Reactive Protein, Serum: 5.92 mg/dL (04-02-20 @ 05:33)  C-Reactive Protein, Serum: 9.95 mg/dL (04-01-20 @ 05:06)  C-Reactive Protein, Serum: 7.17 mg/dL (03-30-20 @ 14:47)    Ferritin, Serum: 1183 ng/mL (04-01-20 @ 05:06)  Ferritin, Serum: 998 ng/mL (03-30-20 @ 14:47)    Sedimentation Rate, Erythrocyte: 41 mm/hr (03-30-20 @ 14:47)   Procalcitonin, Serum: 0.60 ng/mL (04-01-20 @ 05:06)  Procalcitonin, Serum: 0.34 ng/mL (03-30-20 @ 14:47)      COVID-19 PCR: Detected (03-30-20 @ 20:42)

## 2020-04-03 NOTE — PROGRESS NOTE ADULT - SUBJECTIVE AND OBJECTIVE BOX
Chief complaint:   Patient is a 59y old  Male who presents with a chief complaint of shortness of breath (02 Apr 2020 14:24)    HPI:  59 yr old male with hypertension, diabetes mellitus, hyperlipidemia, morbid obesity, NOBLE on CPAP, DVT s/p IVC filter on anticoagulation, CKD stage 3 admitted with complaints of one week of fatigue, body aches, progressively worsening shortness of breath with a cough. Recently lost father in law. States his wife has similar symptoms and is in the ED with similar symptoms. He is not on home oxygen. Severely hypoxic in ED. No nausea, vomiting, abdominal pain, diarrhea. Follows nephrology. (30 Mar 2020 19:47)        OVERNIGHT EVENTS:      ROS: [ ]  Unable to assess due to mental status   All other systems negative    -----------------------------------------------------------------------------------------------------------------------------------------------------------------------------------  ICU Vital Signs Last 24 Hrs  T(C): 36.5 (03 Apr 2020 05:00), Max: 36.8 (02 Apr 2020 12:00)  T(F): 97.7 (03 Apr 2020 05:00), Max: 98.2 (02 Apr 2020 12:00)  HR: 60 (03 Apr 2020 03:50) (58 - 69)  BP: 109/53 (02 Apr 2020 21:09) (109/53 - 109/53)  BP(mean): --  ABP: 138/59 (03 Apr 2020 03:50) (113/55 - 167/73)  ABP(mean): 78 (03 Apr 2020 03:50) (73 - 99)  RR: 28 (03 Apr 2020 03:50) (17 - 28)  SpO2: 92% (03 Apr 2020 03:50) (92% - 97%)      I&O's Summary    02 Apr 2020 07:01  -  03 Apr 2020 07:00  --------------------------------------------------------  IN: 887.9 mL / OUT: 2385 mL / NET: -1497.1 mL        MEDICATIONS  (STANDING):  apixaban 5 milliGRAM(s) Oral every 12 hours  atorvastatin 20 milliGRAM(s) Oral at bedtime  chlorhexidine 0.12% Liquid 15 milliLiter(s) Oral Mucosa every 12 hours  chlorhexidine 2% Cloths 1 Application(s) Topical <User Schedule>  chlorhexidine 4% Liquid 1 Application(s) Topical <User Schedule>  dextrose 5%. 1000 milliLiter(s) (50 mL/Hr) IV Continuous <Continuous>  dextrose 50% Injectable 12.5 Gram(s) IV Push once  dextrose 50% Injectable 25 Gram(s) IV Push once  dextrose 50% Injectable 25 Gram(s) IV Push once  fentaNYL   Infusion. 0.5 MICROgram(s)/kG/Hr (9.05 mL/Hr) IV Continuous <Continuous>  hydroxychloroquine 200 milliGRAM(s) Oral every 12 hours  insulin lispro (HumaLOG) corrective regimen sliding scale   SubCutaneous every 6 hours  methylPREDNISolone sodium succinate Injectable 125 milliGRAM(s) IV Push two times a day  midazolam Infusion 0.02 mG/kG/Hr (3.62 mL/Hr) IV Continuous <Continuous>  pantoprazole  Injectable 40 milliGRAM(s) IV Push daily  polyethylene glycol 3350 17 Gram(s) Oral at bedtime  senna 2 Tablet(s) Oral at bedtime      RESPIRATORY:  Mode: AC/ CMV (Assist Control/ Continuous Mandatory Ventilation)  RR (machine): 28  TV (machine): 480  FiO2: 50  PEEP: 18  ITime: 0.6  MAP: 23  PIP: 35        NEUROIMAGING:   Recent imaging studies were reviewed.    LAB RESULTS:                          10.2   14.06 )-----------( 151      ( 03 Apr 2020 05:18 )             31.5           04-03    137  |  96<L>  |  100.0<H>  ----------------------------<  266<H>  6.2<HH>   |  21.0<L>  |  5.21<H>    Ca    7.7<L>      03 Apr 2020 05:18  Phos  8.1     04-03  Mg     2.7     04-03    TPro  5.6<L>  /  Alb  2.9<L>  /  TBili  0.4  /  DBili  x   /  AST  32  /  ALT  27  /  AlkPhos  55  04-03          ABG - ( 03 Apr 2020 04:22 )  pH, Arterial: 7.33  pH, Blood: x     /  pCO2: 44    /  pO2: 88    / HCO3: 22    / Base Excess: -2.4  /  SaO2: 96          -----------------------------------------------------------------------------------------------------------------------------------------------------------------------------------    PHYSICAL EXAM:  General: Calm, intubated, sedated for vent synchrony  HEENT: MMM  Neuro:  -Mental status- No acute distress  -CN- PERRL 3mm, EOMI, tongue midline, face symmetric    CV: RRR  Pulm: diminished to auscultation  Abd: Soft, nontender, nondistended  Ext: moderate edema in lower ext  Skin: warm, dry Chief complaint:   Patient is a 59y old  Male who presents with a chief complaint of shortness of breath (02 Apr 2020 14:24)    HPI:  59 yr old male with hypertension, diabetes mellitus, hyperlipidemia, morbid obesity, NOBLE on CPAP, DVT s/p IVC filter on anticoagulation, CKD stage 3 admitted with complaints of one week of fatigue, body aches, progressively worsening shortness of breath with a cough. Recently lost father in law. States his wife has similar symptoms and is in the ED with similar symptoms. He is not on home oxygen. Severely hypoxic in ED. No nausea, vomiting, abdominal pain, diarrhea. Follows nephrology. (30 Mar 2020 19:47)        OVERNIGHT EVENTS:  off 1.8L yesterday   Hyperkalemia treated medically    ROS: [ x]  Unable to assess due to mental status   All other systems negative    -----------------------------------------------------------------------------------------------------------------------------------------------------------------------------------  ICU Vital Signs Last 24 Hrs  T(C): 36.5 (03 Apr 2020 05:00), Max: 36.8 (02 Apr 2020 12:00)  T(F): 97.7 (03 Apr 2020 05:00), Max: 98.2 (02 Apr 2020 12:00)  HR: 60 (03 Apr 2020 03:50) (58 - 69)  BP: 109/53 (02 Apr 2020 21:09) (109/53 - 109/53)  BP(mean): --  ABP: 138/59 (03 Apr 2020 03:50) (113/55 - 167/73)  ABP(mean): 78 (03 Apr 2020 03:50) (73 - 99)  RR: 28 (03 Apr 2020 03:50) (17 - 28)  SpO2: 92% (03 Apr 2020 03:50) (92% - 97%)      I&O's Summary    02 Apr 2020 07:01  -  03 Apr 2020 07:00  --------------------------------------------------------  IN: 887.9 mL / OUT: 2385 mL / NET: -1497.1 mL        MEDICATIONS  (STANDING):  apixaban 5 milliGRAM(s) Oral every 12 hours  atorvastatin 20 milliGRAM(s) Oral at bedtime  chlorhexidine 0.12% Liquid 15 milliLiter(s) Oral Mucosa every 12 hours  chlorhexidine 2% Cloths 1 Application(s) Topical <User Schedule>  chlorhexidine 4% Liquid 1 Application(s) Topical <User Schedule>  dextrose 5%. 1000 milliLiter(s) (50 mL/Hr) IV Continuous <Continuous>  dextrose 50% Injectable 12.5 Gram(s) IV Push once  dextrose 50% Injectable 25 Gram(s) IV Push once  dextrose 50% Injectable 25 Gram(s) IV Push once  fentaNYL   Infusion. 0.5 MICROgram(s)/kG/Hr (9.05 mL/Hr) IV Continuous <Continuous>  hydroxychloroquine 200 milliGRAM(s) Oral every 12 hours  insulin lispro (HumaLOG) corrective regimen sliding scale   SubCutaneous every 6 hours  methylPREDNISolone sodium succinate Injectable 125 milliGRAM(s) IV Push two times a day  midazolam Infusion 0.02 mG/kG/Hr (3.62 mL/Hr) IV Continuous <Continuous>  pantoprazole  Injectable 40 milliGRAM(s) IV Push daily  polyethylene glycol 3350 17 Gram(s) Oral at bedtime  senna 2 Tablet(s) Oral at bedtime      RESPIRATORY:  Mode: AC/ CMV (Assist Control/ Continuous Mandatory Ventilation)  RR (machine): 28  TV (machine): 480  FiO2: 50  PEEP: 18  ITime: 0.6  MAP: 23  PIP: 35        NEUROIMAGING:   Recent imaging studies were reviewed.    LAB RESULTS:                          10.2   14.06 )-----------( 151      ( 03 Apr 2020 05:18 )             31.5           04-03    137  |  96<L>  |  100.0<H>  ----------------------------<  266<H>  6.2<HH>   |  21.0<L>  |  5.21<H>    Ca    7.7<L>      03 Apr 2020 05:18  Phos  8.1     04-03  Mg     2.7     04-03    TPro  5.6<L>  /  Alb  2.9<L>  /  TBili  0.4  /  DBili  x   /  AST  32  /  ALT  27  /  AlkPhos  55  04-03          ABG - ( 03 Apr 2020 04:22 )  pH, Arterial: 7.33  pH, Blood: x     /  pCO2: 44    /  pO2: 88    / HCO3: 22    / Base Excess: -2.4  /  SaO2: 96          -----------------------------------------------------------------------------------------------------------------------------------------------------------------------------------    PHYSICAL EXAM:  General: Calm, intubated, sedated for vent synchrony  HEENT: MMM  Neuro:  -Mental status- No acute distress  -CN- PERRL 3mm, EOMI, tongue midline, face symmetric    CV: RRR  Pulm: diminished to auscultation  Abd: Soft, nontender, nondistended  Ext: moderate edema in lower ext  Skin: warm, dry

## 2020-04-03 NOTE — CHART NOTE - NSCHARTNOTEFT_GEN_A_CORE
call placed to pts daughter Estela to provide update (spouse Laura remains hospitalized), all questions answered

## 2020-04-03 NOTE — PROGRESS NOTE ADULT - ASSESSMENT
GODWIN on CKD: Covid 19 (+)   Resp failure  Hyperkalemia  - further HD today  - monitor for signs of renal recovery    Anemia: multifactorial  - monitor H/H  - GIOVANNI as needed to maintain Hgb> 10.0    RO: hyperphosphatemia  - add binders

## 2020-04-03 NOTE — PROGRESS NOTE ADULT - ATTENDING COMMENTS
I spent 40 minutes of critical care time examining patient, reviewing vitals, labs, medications, imaging and discussing with the team goals of care to prevent life-threatening in this patient who is at high risk for deterioration or death due to:  covid19    Family was contacted as documented in event note
I spent 45 minutes of critical care time examining patient, reviewing vitals, labs, medications, imaging and discussing with the team goals of care to prevent life-threatening in this patient who is at high risk for deterioration or death due to:  covid19
Discussed with patient, ICU and RN at bedside.

## 2020-04-04 NOTE — PROGRESS NOTE ADULT - ASSESSMENT
Assessment:  58 yo M with multiple comorbidities with Acute hypoxemic respiratory failure, vent dependent.  Severe ARDS/acute viral PNA/COVID+. Significant hypoxemia due to PNA/ARDS + R lung atelectasis/consolidation.  COPD, on steroids now.  Cytokine storming/HLH. On steroids.  Afib, rate controlled. On Eliquis.  Shock due to pulmonary HTN/high PEEP settings + sedatives.   GODWIN, ATN + possible prerenal + possible direct viral damage + cytokine storm.    Plan:    Today’s adjustments:  -requires high FiO2/PEEP - vent switched - on 34/480/100/24+ now  -recruitment maneuver - no effect, likely recruiting L lung  -started on Nimbex - better vent synchrony but still low Osat  -R lung up now; cannot prone - safety concern + high risk of deterioration due to body mass + significant abd tissue which will increase abd pressure  -concern for RV strain/PE, ruled out - EKG acceptable, trops slightly elevated, difficult TTE eval   -procal stable, afebrile, leukocytosis due to steroids, recent Cx negative - doubt superinfection  -hyperK treatment  -will start bicarb ggt  -diuresis    Rest of the management as follows:  -Neuro:   continue sedation/analgesia, aim for comfort and vent synchrony  Paralytics  Maintain normothermia; acetaminophen 1 gram enterally q6hr PRN, avoid if severe/worsening transaminitis    -Cardiovascular:  Consider using low-dose vasopressor as necessary for MAP>65    -Pulmonary:  Continue LTV ventilation with permissive hypercapnia and high PEEP/low FiO2 strategy  Pplat goal <30  Check for AutoPEEP  Steroids: completing today.  Tocilizumab/Anakinra: follow cytokine panel/HLH labs.    -Gastrointestinal:  Enteral nutrition: hold as paralyzed.  Stress ulcer prophylaxis.  BM regimen: senna+Miralax.    -Renal:  Avoid nephrotoxins.  Diuresis to achieve even or negative (if hemodynamically stable) fluid balance.  Conservative fluid management.  Strict I&Os.  Monitor electrolytes: aim for K 4 (cautious repletion if renal impairment)-Phos 3-Mg >2.    -ID:  Anti-viral therapy completed HCQ  ID involvement    -Hematology:  DVT prophylaxis with SCDs, SQH  Goals platelets>10 and Hgb>7    -Endocrinology:  Goal serum glucose 120-180; Insulin if needed    Labs.   Daily labs:  BMP + Magnesium, Phosphate  LFT  CBC with differential    Q48hrs labs, add to daily labs:  D-dimer  HLH labs (C-reactive protein, Ferritin, LDH)  Troponin  Coags  Consider TG if on Propofol ggt    Code status: DNR.   Dispo: ICU.  Family communication: ongoing GOC with the family.  Patient is at high risk of deterioration, death and disability due to respiratory failure, shock, cardiac failure, multiorgan failure, neurologic damage.    Plan discussed with ICU team.

## 2020-04-04 NOTE — CHART NOTE - NSCHARTNOTEFT_GEN_A_CORE
Source: Patient [ ]  Family [ ]   other [X]    Current Diet:  NPO    PO intake:  < 50% [ ]   50-75%  [ ]   %  [ ]  other : NPO    Source for PO intake [ ] Patient [ ] family [X] chart [ ] staff [ ] other    Current Weight:   3/31/2020: 399#  Mild generalized edema noted. Obtain daily wt to monitor trends.     Pertinent Medications: MEDICATIONS  (STANDING):  apixaban 5 milliGRAM(s) Oral every 12 hours  atorvastatin 20 milliGRAM(s) Oral at bedtime  calcium gluconate IVPB 3 Gram(s) IV Intermittent once  chlorhexidine 0.12% Liquid 15 milliLiter(s) Oral Mucosa every 12 hours  chlorhexidine 2% Cloths 1 Application(s) Topical <User Schedule>  chlorhexidine 4% Liquid 1 Application(s) Topical <User Schedule>  dextrose 5%. 1000 milliLiter(s) (50 mL/Hr) IV Continuous <Continuous>  dextrose 50% Injectable 50 milliLiter(s) IV Push once  dextrose 50% Injectable 12.5 Gram(s) IV Push once  dextrose 50% Injectable 25 Gram(s) IV Push once  dextrose 50% Injectable 25 Gram(s) IV Push once  fentaNYL   Infusion. 0.5 MICROgram(s)/kG/Hr (9.05 mL/Hr) IV Continuous <Continuous>  insulin glargine Injectable (LANTUS) 10 Unit(s) SubCutaneous every morning  insulin lispro (HumaLOG) corrective regimen sliding scale   SubCutaneous every 6 hours  midazolam Infusion 0.02 mG/kG/Hr (3.62 mL/Hr) IV Continuous <Continuous>  norepinephrine Infusion 0.05 MICROgram(s)/kG/Min (8.48 mL/Hr) IV Continuous <Continuous>  pantoprazole  Injectable 40 milliGRAM(s) IV Push daily  polyethylene glycol 3350 17 Gram(s) Oral at bedtime  senna 2 Tablet(s) Oral at bedtime  sevelamer carbonate Powder 800 milliGRAM(s) Oral three times a day with meals    MEDICATIONS  (PRN):  acetaminophen   Tablet .. 650 milliGRAM(s) Oral every 4 hours PRN Temp greater or equal to 38.5C (101.3F)  ALBUTerol    90 MICROgram(s) HFA Inhaler 2 Puff(s) Inhalation every 4 hours PRN Shortness of Breath and/or Wheezing  dextrose 40% Gel 15 Gram(s) Oral once PRN Blood Glucose LESS THAN 70 milliGRAM(s)/deciliter  glucagon  Injectable 1 milliGRAM(s) IntraMuscular once PRN Glucose LESS THAN 70 milligrams/deciliter  sodium chloride 0.9% lock flush 10 milliLiter(s) IV Push every 1 hour PRN Pre/post blood products, medications, blood draw, and to maintain line patency    Pertinent Labs: CBC Full  -  ( 04 Apr 2020 05:49 )  WBC Count : 18.64 K/uL  RBC Count : 3.85 M/uL  Hemoglobin : 10.5 g/dL  Hematocrit : 32.9 %  Platelet Count - Automated : 163 K/uL  Mean Cell Volume : 85.5 fl  Mean Cell Hemoglobin : 27.3 pg  Mean Cell Hemoglobin Concentration : 31.9 gm/dL  Auto Neutrophil # : 16.05 K/uL  Auto Lymphocyte # : 0.53 K/uL  Auto Monocyte # : 1.01 K/uL  Auto Eosinophil # : 0.00 K/uL  Auto Basophil # : 0.03 K/uL  Auto Neutrophil % : 86.1 %  Auto Lymphocyte % : 2.8 %  Auto Monocyte % : 5.4 %  Auto Eosinophil % : 0.0 %  Auto Basophil % : 0.2 %    Labs: 04-04 Na135 mmol/L Glu 266 mg/dL<H> K+ 6.2 mmol/L<HH> Cr  5.37 mg/dL<H> .0 mg/dL<H> Phos 8.9 mg/dL<H> Alb 3.0 g/dL<L> PAB n/a       Skin: As per EMR, moisture associated dermatitis.    Nutrition focused physical exam not conducted at this time - found signs of malnutrition [ ]absent [ ]present    Subcutaneous fat loss: [ ] Orbital fat pads region, [ ]Buccal fat region, [ ]Triceps region,  [ ]Ribs region    Muscle wasting: [ ]Temples region, [ ]Clavicle region, [ ]Shoulder region, [ ]Scapula region, [ ]Interosseous region,  [ ]thigh region, [ ]Calf region    Estimated Needs:   [X] no change since previous assessment  [ ] recalculated:     Current Nutrition Diagnosis: Pt remains at risk. Increased nutrient needs related to increased physiological demand for nutrient as evidenced by hypoxic respiratory failure likely 2/2 +COVID19 infection. Pt remains intubated and sedated. Aware of pt need for HD. Will continue to monitor renal labs. RD to remain available.     Recommendations:   1. As medically feasible recommend Nepro initiated at 20mL/hr and increase 10mL/hr q 4 hr until goal rate of 50mL/hr (x20 hr) providing 1000mL, 1800kcal, 81g protein, 730mL free water. Additional free water per MD discretion. If bolus: Nepro 250mL x 4 daily bolus.  2. Monitor tolerance.  3. Monitor labs, weights.  4. Recommend Nephro-agnieszka, Vitamin C daily.     Monitoring and Evaluation:   [ ] PO intake [X] Tolerance to diet prescription [X] Weights  [X] Follow up per protocol [X] Labs

## 2020-04-04 NOTE — PROGRESS NOTE ADULT - ASSESSMENT
GODWIN: persistent hyperkalemia  Poor HD last night due to hypotesion  - will attempt conventional HD today and if cannot tolerate will need to transition to CRRT  - cont to monitor for signs of renal recovery    Anemia: multifactorial  - monitor H/H off GIOVANNI  - GIOVANNI as needed to maintain Hgb> 10.0    RO: hyperphosphatemia  - cont binders

## 2020-04-04 NOTE — PROGRESS NOTE ADULT - SUBJECTIVE AND OBJECTIVE BOX
HPI:  59 yr old male with hypertension, diabetes mellitus, hyperlipidemia, morbid obesity, NOBLE on CPAP, DVT s/p IVC filter on anticoagulation, CKD stage 3 admitted with complaints of one week of fatigue, body aches, progressively worsening shortness of breath with a cough. Recently lost father in law. States his wife has similar symptoms and is in the ED with similar symptoms. He is not on home oxygen. Severely hypoxic in ED. No nausea, vomiting, abdominal pain, diarrhea. Follows nephrology. (30 Mar 2020 19:47)    Past Medical History, Family History, Social History:  PAST MEDICAL & SURGICAL HISTORY:  CKD (chronic kidney disease) stage 3, GFR 30-59 ml/min  Obesity  PVD (peripheral vascular disease)  DVT (deep venous thrombosis)  Pulmonary embolism  DM (diabetes mellitus)  HLD (hyperlipidemia)  HTN (hypertension)  No significant past surgical history    FAMILY HISTORY:  Family history of lung cancer    Social History:  denies smoking, alcohol and drug abuse (30 Mar 2020 19:47)    Interval History:  24 Hour Events: remians critically ill on ventilator, completed HD. Lantus added     1. Patient's Neurologic Exam unchanged.    2. Patient's Hemodynamic's maintained with off pressors    3. Patient's Respiratory status is: on ARDS setting 28/480/60/20    4. Patient is pending: neurological & respiratory recovery    5. Dispo: ICU    Review of Systems:  Review of Systems is Limited due to patient's neurologic status.    Physical Exam:  PHYSICAL EXAM:  General: Calm, intubated, sedated for vent synchrony  HEENT: MMM  Neuro:  -Mental status- No acute distress  -CN- PERRL 3mm, EOMI, tongue midline, face symmetric    CV: RRR  Pulm: diminished to auscultation  Abd: Soft, nontender, nondistended  Ext: moderate edema in lower ext  Skin: warm, dry      Vital Signs Last 24 Hrs  T(C): 36.4 (04 Apr 2020 00:35), Max: 36.7 (03 Apr 2020 08:23)  T(F): 97.5 (04 Apr 2020 00:35), Max: 98 (03 Apr 2020 08:23)  HR: 60 (04 Apr 2020 00:54) (58 - 60)  BP: --  BP(mean): --  RR: 28 (04 Apr 2020 00:54) (28 - 30)  SpO2: 96% (04 Apr 2020 00:54) (94% - 96%)    I&O's Summary    02 Apr 2020 07:01  -  03 Apr 2020 07:00  --------------------------------------------------------  IN: 887.9 mL / OUT: 2385 mL / NET: -1497.1 mL    03 Apr 2020 07:01  -  04 Apr 2020 04:43  --------------------------------------------------------  IN: 0 mL / OUT: 1135 mL / NET: -1135 mL      Labs & Radiology:                        10.2   14.06 )-----------( 151      ( 03 Apr 2020 05:18 )             31.5       04-03    137  |  96<L>  |  100.0<H>  ----------------------------<  266<H>  6.2<HH>   |  21.0<L>  |  5.21<H>    Ca    7.7<L>      03 Apr 2020 05:18  Phos  8.1     04-03  Mg     2.7     04-03    TPro  5.6<L>  /  Alb  2.9<L>  /  TBili  0.4  /  DBili  x   /  AST  32  /  ALT  27  /  AlkPhos  55  04-03      LIVER FUNCTIONS - ( 03 Apr 2020 05:18 )  Alb: 2.9 g/dL / Pro: 5.6 g/dL / ALK PHOS: 55 U/L / ALT: 27 U/L / AST: 32 U/L / GGT: x           Culture - Urine (collected 01 Apr 2020 08:47)  Source: .Urine  Final Report (02 Apr 2020 08:21):    No growth    ABG - ( 03 Apr 2020 14:35 )  pH, Arterial: 7.29  pH, Blood: x     /  pCO2: 47    /  pO2: 71    / HCO3: 21    / Base Excess: -3.8  /  SaO2: 94          CAPILLARY BLOOD GLUCOSE    POCT Blood Glucose.: 260 mg/dL (03 Apr 2020 21:48)  POCT Blood Glucose.: 275 mg/dL (03 Apr 2020 06:35)    Medications:  MEDICATIONS  (STANDING):  apixaban 5 milliGRAM(s) Oral every 12 hours  atorvastatin 20 milliGRAM(s) Oral at bedtime  chlorhexidine 0.12% Liquid 15 milliLiter(s) Oral Mucosa every 12 hours  chlorhexidine 2% Cloths 1 Application(s) Topical <User Schedule>  chlorhexidine 4% Liquid 1 Application(s) Topical <User Schedule>  dextrose 5%. 1000 milliLiter(s) (50 mL/Hr) IV Continuous <Continuous>  dextrose 50% Injectable 12.5 Gram(s) IV Push once  dextrose 50% Injectable 25 Gram(s) IV Push once  dextrose 50% Injectable 25 Gram(s) IV Push once  fentaNYL   Infusion. 0.5 MICROgram(s)/kG/Hr (9.05 mL/Hr) IV Continuous <Continuous>  hydroxychloroquine 200 milliGRAM(s) Oral every 12 hours  insulin glargine Injectable (LANTUS) 10 Unit(s) SubCutaneous every morning  insulin lispro (HumaLOG) corrective regimen sliding scale   SubCutaneous every 6 hours  methylPREDNISolone sodium succinate Injectable 125 milliGRAM(s) IV Push two times a day  midazolam Infusion 0.02 mG/kG/Hr (3.62 mL/Hr) IV Continuous <Continuous>  pantoprazole  Injectable 40 milliGRAM(s) IV Push daily  polyethylene glycol 3350 17 Gram(s) Oral at bedtime  senna 2 Tablet(s) Oral at bedtime  sevelamer carbonate Powder 800 milliGRAM(s) Oral three times a day with meals    MEDICATIONS  (PRN):  acetaminophen   Tablet .. 650 milliGRAM(s) Oral every 4 hours PRN Temp greater or equal to 38.5C (101.3F)  ALBUTerol    90 MICROgram(s) HFA Inhaler 2 Puff(s) Inhalation every 4 hours PRN Shortness of Breath and/or Wheezing  dextrose 40% Gel 15 Gram(s) Oral once PRN Blood Glucose LESS THAN 70 milliGRAM(s)/deciliter  glucagon  Injectable 1 milliGRAM(s) IntraMuscular once PRN Glucose LESS THAN 70 milligrams/deciliter  sodium chloride 0.9% lock flush 10 milliLiter(s) IV Push every 1 hour PRN Pre/post blood products, medications, blood draw, and to maintain line patency      Assessment:  59 yr old male with hypertension, diabetes mellitus, hyperlipidemia, morbid obesity, NOBLE on CPAP, DVT s/p IVC filter on anticoagulation, CKD stage 3 admitted with complaints of one week of fatigue, body aches, progressively worsening shortness of breath with a cough    NEUROLOGIC  Mental status: sedated  Sedation: midazolam and fentanyl    RESPIRATORY  [] Acute respiratory failure with: [x] hypoxemia / [] hypercapnia  ARDS  Ideal body weight: 72kg  Ventilation settings: 28/480/60%/20    CARDIOVASCULAR  Shock: [x] septic   MAP>65    GASTROINTESTINAL:   Tube feeds:  nepro @60  GI prophylaxis: [] famotidine [x] pantoprazole  Bowel regimen [x] senna [x] Miralax     RENAL  [x] GODWIN on CKD (baseline Cr: _3.0_)   goal I/O even to -1L  Renal is following, s/p HD on 4/3  Cont Ranvela 800 tid  oliguric- urology placed quijano due to body habitus    ENDOCRINE  [] insulin regimen: [] infusion, [x] glargine 10u, [] NPH, [x] sliding scale  [x] stress-dose steroids stop 4/4  Goal FSG < 180    HEMATOLOGY  DVT  changed rivaroxaban to apixaban due to HD    INFECTIOUS DISEASES   Rule out: SARS-CoV-2 infection/COVID-19  Procalcitonin: trend  afebrile  COVID +  [x] Hydroxycholoroquine (day) stop 4/4  Methylprednisolone 125 bid end on 4/4    ADVANCED DIRECTIVES:  [x] Full code [] DNR [] DNI [] Comfort measures    DISPOSITION:   [] ICU

## 2020-04-04 NOTE — PROGRESS NOTE ADULT - SUBJECTIVE AND OBJECTIVE BOX
[facility]  INFECTIOUS DISEASES AND INTERNAL MEDICINE at [place]  =======================================================  Miguel Dee MD  Diplomates American Board of Internal Medicine and Infectious Diseases  Telephone [phone]  Fax            [phone]  =======================================================    [facility]-[id number]  FRENCH DEY   followup: COVID 19 PNA  remains intubated.    no fevers  renal function worsened.     =======================================================  REVIEW OF SYSTEMS:  Limited due to medical condition    =======================================================  Allergies  penicillin (Fever)    =======================================================  Physical Exam:  ============  (see vitals section below)    General:  sedated.; OBESE  Eye: Pupils are equal, round and reactive to light, Extraocular movements are intact, Normal conjunctiva.  HENT: Normocephalic, dry OM, ET tube in place  Neck: Supple, No lymphadenopathy.  Respiratory: Lungs POOR air entry  Cardiovascular: Normal rate, Regular rhythm,   Gastrointestinal: Soft, Non-distended, Normal bowel sounds.  Genitourinary:  SAWYER   Lymphatics: No lymphadenopathy neck,   Musculoskeletal: unable to assess  Integumentary: no rash  Neurologic: sedated     =======================================================   Vitals:  ============  T(F): 98.3 (04 Apr 2020 04:00), Max: 98.3 (04 Apr 2020 04:00)  HR: 59 (04 Apr 2020 04:00)  BP: --  RR: 28 (04 Apr 2020 04:00)  SpO2: 93% (04 Apr 2020 04:00) (93% - 96%)  temp max in last 48H T(F): , Max: 98.3 (04-04-20 @ 04:00)    =======================================================  Current Antibiotics:    Other medications:  apixaban 5 milliGRAM(s) Oral every 12 hours  atorvastatin 20 milliGRAM(s) Oral at bedtime  calcium gluconate IVPB 3 Gram(s) IV Intermittent once  chlorhexidine 0.12% Liquid 15 milliLiter(s) Oral Mucosa every 12 hours  chlorhexidine 2% Cloths 1 Application(s) Topical <User Schedule>  chlorhexidine 4% Liquid 1 Application(s) Topical <User Schedule>  dextrose 5%. 1000 milliLiter(s) IV Continuous <Continuous>  dextrose 50% Injectable 50 milliLiter(s) IV Push once  dextrose 50% Injectable 12.5 Gram(s) IV Push once  dextrose 50% Injectable 25 Gram(s) IV Push once  dextrose 50% Injectable 25 Gram(s) IV Push once  fentaNYL   Infusion. 0.5 MICROgram(s)/kG/Hr IV Continuous <Continuous>  insulin glargine Injectable (LANTUS) 10 Unit(s) SubCutaneous every morning  insulin lispro (HumaLOG) corrective regimen sliding scale   SubCutaneous every 6 hours  midazolam Infusion 0.02 mG/kG/Hr IV Continuous <Continuous>  norepinephrine Infusion 0.05 MICROgram(s)/kG/Min IV Continuous <Continuous>  pantoprazole  Injectable 40 milliGRAM(s) IV Push daily  polyethylene glycol 3350 17 Gram(s) Oral at bedtime  senna 2 Tablet(s) Oral at bedtime  sevelamer carbonate Powder 800 milliGRAM(s) Oral three times a day with meals      =======================================================  Labs:                        10.5   18.64 )-----------( 163      ( 04 Apr 2020 05:49 )             32.9       WBC Count: 18.64 K/uL (04-04-20 @ 05:49)  WBC Count: 14.06 K/uL (04-03-20 @ 05:18)  WBC Count: 12.52 K/uL (04-02-20 @ 05:33)  WBC Count: 14.47 K/uL (04-01-20 @ 05:06)  WBC Count: 11.28 K/uL (03-31-20 @ 12:28)  WBC Count: 10.67 K/uL (03-30-20 @ 14:47)      04-04    135  |  96<L>  |  103.0<H>  ----------------------------<  266<H>  6.2<HH>   |  22.0  |  5.37<H>    Ca    7.6<L>      04 Apr 2020 05:49  Phos  8.9     04-04  Mg     2.7     04-04    TPro  5.7<L>  /  Alb  3.0<L>  /  TBili  0.3<L>  /  DBili  x   /  AST  25  /  ALT  23  /  AlkPhos  53  04-04      Culture - Urine (collected 04-01-20 @ 08:47)  Source: .Urine  Final Report (04-02-20 @ 08:21):    No growth    Culture - Blood (collected 03-30-20 @ 15:42)  Source: .Blood    Culture - Blood (collected 03-30-20 @ 15:39)  Source: .Blood      Creatinine, Serum: 5.37 mg/dL (04-04-20 @ 05:49)  Creatinine, Serum: 5.21 mg/dL (04-03-20 @ 05:18)  Creatinine, Serum: 4.88 mg/dL (04-02-20 @ 05:33)  Creatinine, Serum: 5.46 mg/dL (04-01-20 @ 20:54)  Creatinine, Serum: 4.91 mg/dL (04-01-20 @ 05:06)  Creatinine, Serum: 3.96 mg/dL (03-31-20 @ 12:28)  Creatinine, Serum: 3.53 mg/dL (03-30-20 @ 14:47)    C-Reactive Protein, Serum: 2.02 mg/dL (04-04-20 @ 10:36)  C-Reactive Protein, Serum: 5.92 mg/dL (04-02-20 @ 05:33)  C-Reactive Protein, Serum: 9.95 mg/dL (04-01-20 @ 05:06)  C-Reactive Protein, Serum: 7.17 mg/dL (03-30-20 @ 14:47)    Ferritin, Serum: 611 ng/mL (04-04-20 @ 10:36)  Ferritin, Serum: 1183 ng/mL (04-01-20 @ 05:06)  Ferritin, Serum: 998 ng/mL (03-30-20 @ 14:47)    Sedimentation Rate, Erythrocyte: 41 mm/hr (03-30-20 @ 14:47)    Procalcitonin, Serum: 0.60 ng/mL (04-01-20 @ 05:06)  Procalcitonin, Serum: 0.34 ng/mL (03-30-20 @ 14:47)        COVID-19 PCR: Detected (03-30-20 @ 20:42)

## 2020-04-04 NOTE — GOALS OF CARE CONVERSATION - ADVANCED CARE PLANNING - CONVERSATION DETAILS
Pt's wife is currently hospitalized with COVID PNA, daughter is recovering from COVID.   Sister Hanane Romero is visiting our pt today and has been the primary contact for our pt.  GOC, current critical condition and current management discussed; we will continue our current management but in case of deterioration/cardiac arrest - pt's status is DNR ("no chest compressions").

## 2020-04-04 NOTE — PROGRESS NOTE ADULT - SUBJECTIVE AND OBJECTIVE BOX
NEPHROLOGY INTERVAL HPI/OVERNIGHT EVENTS:  pt remains vented  Hemodynamics are labile==> still pressor dependent  HD yesterday had to be stopped prematurely as he was not tolerating well due to hypotension    MEDICATIONS  (STANDING):  apixaban 5 milliGRAM(s) Oral every 12 hours  atorvastatin 20 milliGRAM(s) Oral at bedtime  chlorhexidine 0.12% Liquid 15 milliLiter(s) Oral Mucosa every 12 hours  chlorhexidine 2% Cloths 1 Application(s) Topical <User Schedule>  chlorhexidine 4% Liquid 1 Application(s) Topical <User Schedule>  dextrose 5%. 1000 milliLiter(s) (50 mL/Hr) IV Continuous <Continuous>  dextrose 50% Injectable 12.5 Gram(s) IV Push once  dextrose 50% Injectable 25 Gram(s) IV Push once  dextrose 50% Injectable 25 Gram(s) IV Push once  fentaNYL   Infusion. 0.5 MICROgram(s)/kG/Hr (9.05 mL/Hr) IV Continuous <Continuous>  hydroxychloroquine 200 milliGRAM(s) Oral every 12 hours  insulin glargine Injectable (LANTUS) 10 Unit(s) SubCutaneous every morning  insulin lispro (HumaLOG) corrective regimen sliding scale   SubCutaneous every 6 hours  midazolam Infusion 0.02 mG/kG/Hr (3.62 mL/Hr) IV Continuous <Continuous>  norepinephrine Infusion 0.05 MICROgram(s)/kG/Min (8.48 mL/Hr) IV Continuous <Continuous>  pantoprazole  Injectable 40 milliGRAM(s) IV Push daily  polyethylene glycol 3350 17 Gram(s) Oral at bedtime  senna 2 Tablet(s) Oral at bedtime  sevelamer carbonate Powder 800 milliGRAM(s) Oral three times a day with meals    MEDICATIONS  (PRN):  acetaminophen   Tablet .. 650 milliGRAM(s) Oral every 4 hours PRN Temp greater or equal to 38.5C (101.3F)  ALBUTerol    90 MICROgram(s) HFA Inhaler 2 Puff(s) Inhalation every 4 hours PRN Shortness of Breath and/or Wheezing  dextrose 40% Gel 15 Gram(s) Oral once PRN Blood Glucose LESS THAN 70 milliGRAM(s)/deciliter  glucagon  Injectable 1 milliGRAM(s) IntraMuscular once PRN Glucose LESS THAN 70 milligrams/deciliter  sodium chloride 0.9% lock flush 10 milliLiter(s) IV Push every 1 hour PRN Pre/post blood products, medications, blood draw, and to maintain line patency      Allergies    penicillin (Fever)          Vital Signs Last 24 Hrs  T(C): 36.8 (04 Apr 2020 04:00), Max: 36.8 (04 Apr 2020 04:00)  T(F): 98.3 (04 Apr 2020 04:00), Max: 98.3 (04 Apr 2020 04:00)  HR: 59 (04 Apr 2020 04:00) (58 - 60)  BP: --  BP(mean): --  RR: 28 (04 Apr 2020 04:00) (28 - 30)  SpO2: 93% (04 Apr 2020 04:00) (93% - 96%)    PHYSICAL EXAM:  GENERAL: Vented; obese  HEAD: intubated   NECK: Supple, No JVD, L IJ Reilly   CHEST/LUNG: +rhonchi   HEART: Regular rate and rhythm; No rub  ABDOMEN: Soft, nontender +BS  EXTREMITIES:  + edema    LABS:                        10.5   18.64 )-----------( 163      ( 04 Apr 2020 05:49 )             32.9     04-04    135  |  96<L>  |  103.0<H>  ----------------------------<  266<H>  6.2<HH>   |  22.0  |  5.37<H>    Ca    7.6<L>      04 Apr 2020 05:49  Phos  8.9     04-04  Mg     2.7     04-04    TPro  5.7<L>  /  Alb  3.0<L>  /  TBili  0.3<L>  /  DBili  x   /  AST  25  /  ALT  23  /  AlkPhos  53  04-04        Magnesium, Serum: 2.7 mg/dL (04-04 @ 05:49)  Phosphorus Level, Serum: 8.9 mg/dL (04-04 @ 05:49)      RADIOLOGY & ADDITIONAL TESTS:  < from: Xray Chest 1 View- PORTABLE-Urgent (04.03.20 @ 00:19) >     EXAM:  XR CHEST PORTABLE URGENT 1V                          PROCEDURE DATE:  04/03/2020          INTERPRETATION:  CLINICAL STATEMENT: CLINICAL STATEMENT: Evaluate NG tube position    TECHNIQUE: AP view of the chest.    COMPARISON: 4/1/2020     Findings:    NG tube is further advanced with its tip beyond the field of view of this exam over of bowel gas. Right IJ line and endotracheal tube are in place. Extensive bilateral parenchymal opacities are again noted.      IMPRESSION:    NG tube past epigastrium, likely in the distal stomach or proximal duodenum.    No change of bilateral parenchymal infiltration.    < end of copied text >

## 2020-04-04 NOTE — PROGRESS NOTE ADULT - ASSESSMENT
59 yr old male with hypertension, diabetes mellitus, hyperlipidemia, morbid obesity, NOBLE on CPAP, DVT s/p IVC filter on anticoagulation, CKD stage 3 admitted with complaints of one week of fatigue, body aches, progressively worsening shortness of breath with a cough. Recently lost father in law. States his wife has similar symptoms and is in the ED with similar symptoms. He is not on home oxygen. Severely hypoxic in ED. No nausea, vomiting, abdominal pain, diarrhea. Follows nephrology. (30 Mar 2020 19:47)    patient was intubated on 3/31/2020. Now under ICU care in [address] [place] [address].   COVID 19 testing positive.  started on Hydroxychloroquine      Impression:  COVID-19 Pneumonia  multifocal Pneumonia  acute hypoxic respiratory failuire  s/p intubation    Plan:   Continue supportive care measures  - continue mechanical ventilation  - continue steroids course as per ICU team    continue course of Hydroxychloroquine  - will need to monitor for  QT prolongation    Procalc levels stable, will watch  CRP down going      Renal failure  - for HD today    - trend CBC with diff, CMP with LFT's  - trend Inflammatory markers (ESR, CRP, Ferritin, LDH,  procalcitonin)  q48h.  D dimer, lactate, troponin, CK, PT/PTT x 1      Will follow with you.

## 2020-04-04 NOTE — PROGRESS NOTE ADULT - SUBJECTIVE AND OBJECTIVE BOX
HPI:    24 hr events:    Vitals, telemetry, labs, recent imaging reviewed.  Meds reviewed and adjusted.    Exam:  Neuro: sedated, paralyzed. Pupils  Pulm: supine/prone, synch with the vent.  Cardiac: regular rhythm.  Abd: soft, not distended.  Peripheral edema    Devices.  ETT: present.  Central line: R/L IJ, SCV, femoral.  A-line: R/L radial, axillary, femoral.  NGT  Perez    Bedside sono:   -BL A-lines, B-lines scattered/confluent  Irregular thickened pleural line  Consolidations  Pl effusions  -Pericardial effusion  LV size, syst function grossly preserved, abnormal  RV size, syst function grossly preserved, abnormal  IVC  -LE femoral, popliteal DVT       Assessment:  Acute hypoxemic respiratory failure, vent dependent.  ARDS/acute viral PNA/COVID+.  Cytokine storming/HLH.  Cardiomyopathy likely due to acute viral myocarditis.  Prolonged QTc.  Shock due to pulmonary HTN/high PEEP settings + sedatives.   GODWIN, ATN + possible prerenal + possible direct viral damage + cytokine storm.  Transaminitis. Potential causes: direct viral damage, meds hepatotoxicity, shock liver, cytokine storm.    Plan:    Today’s adjustments:  -    Rest of the management as follows:  -Neuro:   continue sedation/analgesia, aim for comfort and vent synchrony  Paralytics  Adjunctive atypical antipsychotic: 10 mg Olanzapine BID via NGT   Maintain normothermia; acetaminophen 1 gram enterally q6hr PRN, avoid if severe/worsening transaminitis    -Cardiovascular:  Consider using low-dose vasopressor as necessary for MAP>65    -Pulmonary:  LTV ventilation with permissive hypercapnia and high PEEP/low FiO2 strategy  Pplat goal <30  Check for AutoPEEP  P/F ratio calculation: consider proning if P/F<150 with FiO2 >60%/high PEEP/paralyzed  Steroids: 1mg/kg IV q12hrs Methylprednisolone for 4 days, followed by 0.5 mg/kg q12hrs for Day 5; may consider Dexamethasone equivalent dose (less mineralocorticoid activity incl. Na and water retention); will consider higher doses if signs of cytokine storming/HLH.  Tocilizumab/Anakinra: follow cytokine panel/HLH labs.    -Gastrointestinal:  Enteral nutrition: hold if paralyzed/proned.  Stress ulcer prophylaxis.  BM regimen: senna+Miralax.    -Renal:  Avoid nephrotoxins.  Diuresis to achieve even or negative (if hemodynamically stable) fluid balance.  Conservative fluid management.  Strict I&Os.  Monitor electrolytes: aim for K 4 (cautious repletion if renal impairment)-Phos 3-Mg >2.    -ID:  Anti-viral therapy NGT Hydroxychloroquine 400 q12hrs Day 1, followed by 200 q12hrs Day 2-5.  ID involvement    -Hematology:  DVT prophylaxis with SCDs, high suspicion for hypercoagulable state - chemoppx as follows SQL 40 q12hrs (switch to SQL 30 q24 hrs or SQH 5000 q8hrs)  Goals platelets>10 and Hgb>7    -Endocrinology:  Goal serum glucose 120-180; Insulin if needed  Monitor TG if on Propofol    Labs.   Daily labs:  BMP + Magnesium, Phosphate  LFT  CBC with differential    Q48hrs labs, add to daily labs:  D-dimer  HLH labs (C-reactive protein, Ferritin, LDH)  Troponin  Coags  Consider TG if on Propofol ggt    Code status: full code, DNR, comfort care.  Dispo: ICU.  Family communication: will be updated today.    Patient is at high risk of deterioration, death and disability due to respiratory failure, shock, cardiac failure, multiorgan failure, neurologic damage.    Plan discussed with ICU team. 58 yo M with Afib on AC, COPD, HTN, NOBLE, CKD, presented with COVID PNA, deteriorated, required intubation for hypoxemic resp distress.     24 hr events: desaturates to 76, stays in low 80s; cannot tolerate HD due to hypotension, desaturation.    Vitals, telemetry, labs, recent imaging reviewed.  Meds reviewed and adjusted.    Exam:  Neuro: sedated, paralyzed.   Pulm: supine/prone, synch with the vent.  Cardiac: regular rhythm.  Abd: soft, not distended.  Peripheral edema    Devices.  ETT: present.  Central line: RIJ  A-line: _+  NGT  Perez

## 2020-04-05 NOTE — PROGRESS NOTE ADULT - SUBJECTIVE AND OBJECTIVE BOX
[facility]  INFECTIOUS DISEASES AND INTERNAL MEDICINE at [place]  =======================================================  Miguel Dee MD  Diplomates American Board of Internal Medicine and Infectious Diseases  Telephone [phone]  Fax            [phone]  =======================================================    [facility]-[id number]  FRENCH DEY   followup: COVID 19 PNA  remains intubated.    no fevers  renal function worsened.     =======================================================  REVIEW OF SYSTEMS:  Limited due to medical condition    =======================================================  Allergies  penicillin (Fever)    =======================================================  Physical Exam:  ============  (see vitals section below)    General:  sedated.; OBESE  Eye: Pupils are equal, round and reactive to light, Extraocular movements are intact, Normal conjunctiva.  HENT: Normocephalic, dry OM, ET tube in place  Neck: Supple, No lymphadenopathy.  Respiratory: Lungs POOR air entry  Cardiovascular: Normal rate, Regular rhythm,   Gastrointestinal: Soft, Non-distended, Normal bowel sounds.  Genitourinary:  SAWYER   Lymphatics: No lymphadenopathy neck,   Musculoskeletal: unable to assess  Integumentary: no rash  Neurologic: sedated     =======================================================     Vitals:  ============  T(F): 101.3 (05 Apr 2020 08:00), Max: 101.3 (05 Apr 2020 08:00)  HR: 93 (05 Apr 2020 09:33)  BP: 125/58 (05 Apr 2020 08:00)  RR: 34 (05 Apr 2020 08:00)  SpO2: 75% (05 Apr 2020 09:33) (75% - 86%)  temp max in last 48H T(F): , Max: 101.3 (04-05-20 @ 08:00)    =======================================================  Current Antibiotics:    Other medications:  apixaban 5 milliGRAM(s) Oral every 12 hours  atorvastatin 20 milliGRAM(s) Oral at bedtime  chlorhexidine 0.12% Liquid 15 milliLiter(s) Oral Mucosa every 12 hours  chlorhexidine 2% Cloths 1 Application(s) Topical <User Schedule>  cisatracurium Infusion 3 MICROgram(s)/kG/Min IV Continuous <Continuous>  dextrose 5%. 1000 milliLiter(s) IV Continuous <Continuous>  dextrose 50% Injectable 12.5 Gram(s) IV Push once  dextrose 50% Injectable 25 Gram(s) IV Push once  dextrose 50% Injectable 25 Gram(s) IV Push once  fentaNYL   Infusion. 0.5 MICROgram(s)/kG/Hr IV Continuous <Continuous>  insulin regular Infusion 3 Unit(s)/Hr IV Continuous <Continuous>  midazolam Infusion 0.02 mG/kG/Hr IV Continuous <Continuous>  norepinephrine Infusion 0.05 MICROgram(s)/kG/Min IV Continuous <Continuous>  pantoprazole  Injectable 40 milliGRAM(s) IV Push daily  polyethylene glycol 3350 17 Gram(s) Oral at bedtime  senna 2 Tablet(s) Oral at bedtime  sevelamer carbonate Powder 800 milliGRAM(s) Oral three times a day with meals  sodium bicarbonate  Infusion 0.066 mEq/kG/Hr IV Continuous <Continuous>      =======================================================  Labs:                        12.0   53.54 )-----------( 282      ( 05 Apr 2020 05:02 )             37.8       WBC Count: 53.54 K/uL (04-05-20 @ 05:02)  WBC Count: 18.64 K/uL (04-04-20 @ 05:49)  WBC Count: 14.06 K/uL (04-03-20 @ 05:18)  WBC Count: 12.52 K/uL (04-02-20 @ 05:33)  WBC Count: 14.47 K/uL (04-01-20 @ 05:06)  WBC Count: 11.28 K/uL (03-31-20 @ 12:28)      04-05    138  |  94<L>  |  123.0<H>  ----------------------------<  209<H>  7.4<HH>   |  23.0  |  7.43<H>    Ca    8.0<L>      05 Apr 2020 05:02  Phos  12.1     04-05  Mg     3.1     04-05    TPro  6.4<L>  /  Alb  3.0<L>  /  TBili  0.7  /  DBili  x   /  AST  41<H>  /  ALT  25  /  AlkPhos  94  04-05      Culture - Urine (collected 04-01-20 @ 08:47)  Source: .Urine  Final Report (04-02-20 @ 08:21):    No growth    Culture - Blood (collected 03-30-20 @ 15:42)  Source: .Blood  Final Report (04-04-20 @ 17:00):    No growth at 5 days.    Culture - Blood (collected 03-30-20 @ 15:39)  Source: .Blood  Final Report (04-04-20 @ 17:00):    No growth at 5 days.      Creatinine, Serum: 7.43 mg/dL (04-05-20 @ 05:02)  Creatinine, Serum: 5.37 mg/dL (04-04-20 @ 05:49)  Creatinine, Serum: 5.21 mg/dL (04-03-20 @ 05:18)  Creatinine, Serum: 4.88 mg/dL (04-02-20 @ 05:33)  Creatinine, Serum: 5.46 mg/dL (04-01-20 @ 20:54)  Creatinine, Serum: 4.91 mg/dL (04-01-20 @ 05:06)  Creatinine, Serum: 3.96 mg/dL (03-31-20 @ 12:28)    C-Reactive Protein, Serum: 2.02 mg/dL (04-04-20 @ 10:36)  C-Reactive Protein, Serum: 5.92 mg/dL (04-02-20 @ 05:33)  C-Reactive Protein, Serum: 9.95 mg/dL (04-01-20 @ 05:06)  C-Reactive Protein, Serum: 7.17 mg/dL (03-30-20 @ 14:47)    Ferritin, Serum: 611 ng/mL (04-04-20 @ 10:36)  Ferritin, Serum: 1183 ng/mL (04-01-20 @ 05:06)  Ferritin, Serum: 998 ng/mL (03-30-20 @ 14:47)    Sedimentation Rate, Erythrocyte: 41 mm/hr (03-30-20 @ 14:47)    Procalcitonin, Serum: 0.58 ng/mL (04-04-20 @ 17:03)  Procalcitonin, Serum: 0.60 ng/mL (04-01-20 @ 05:06)  Procalcitonin, Serum: 0.34 ng/mL (03-30-20 @ 14:47)        COVID-19 PCR: Detected (03-30-20 @ 20:42)

## 2020-04-05 NOTE — PROGRESS NOTE ADULT - ASSESSMENT
Assessment:  60 yo M with multiple comorbidities with Acute hypoxemic respiratory failure, vent dependent.  Severe ARDS/acute viral PNA/COVID+. Significant hypoxemia due to PNA/ARDS + R lung atelectasis/consolidation.  COPD, on steroids now.  Cytokine storming/HLH. On steroids.  Afib, rate controlled. On Eliquis.  Shock due to pulmonary HTN/high PEEP settings + sedatives.   GODWIN, ATN + possible prerenal + possible direct viral damage + cytokine storm.  Hyperglycemia, due to insulin resistance, stress-induced, steroids.    Plan:    Today’s adjustments:  -hyperK treatment + Lasix 80; repeat labs in 1 hour after  -will start Insulin ggt  increase Sevelamer to 1500 TID    Rest of the management as follows:  -Neuro:   continue sedation/analgesia, aim for comfort and vent synchrony  Paralytics  Maintain normothermia; acetaminophen 1 gram enterally q6hr PRN, avoid if severe/worsening transaminitis    -Cardiovascular:  Consider using low-dose vasopressor as necessary for MAP>65    -Pulmonary:  Continue LTV ventilation with permissive hypercapnia and high PEEP/low FiO2 strategy  Pplat goal <30  Check for AutoPEEP  Steroids: completing today.  Tocilizumab/Anakinra: follow cytokine panel/HLH labs.    -Gastrointestinal:  Enteral nutrition: hold as paralyzed.  Stress ulcer prophylaxis.  BM regimen: senna+Miralax.    -Renal:  Avoid nephrotoxins.  Diuresis to achieve even or negative (if hemodynamically stable) fluid balance.  Conservative fluid management.  Strict I&Os.  Monitor electrolytes: aim for K 4 (cautious repletion if renal impairment)-Phos 3-Mg >2.  Pending Renal, possible CVVHD.    -ID:  Anti-viral therapy completed HCQ  ID involvement    -Hematology:  DVT prophylaxis with SCDs, SQH  Goals platelets>10 and Hgb>7    -Endocrinology:  Goal serum glucose 120-180; Insulin if needed    Labs.   Daily labs:  BMP + Magnesium, Phosphate  LFT  CBC with differential    Q48hrs labs, add to daily labs:  D-dimer  HLH labs (C-reactive protein, Ferritin, LDH)  Troponin  Coags  Consider TG if on Propofol ggt    Code status: DNR.   Dispo: ICU.  Family communication: ongoing GOC with the family.  Patient is at high risk of deterioration, death and disability due to respiratory failure, shock, cardiac failure, multiorgan failure, neurologic damage.    Plan discussed with ICU team.

## 2020-04-05 NOTE — PROGRESS NOTE ADULT - SUBJECTIVE AND OBJECTIVE BOX
NEPHROLOGY INTERVAL HPI/OVERNIGHT EVENTS:  Events noted==> pt continues to do poorly  Did not tolerate HD yesterday due to labile hemodynamics    MEDICATIONS  (STANDING):  apixaban 5 milliGRAM(s) Oral every 12 hours  atorvastatin 20 milliGRAM(s) Oral at bedtime  chlorhexidine 0.12% Liquid 15 milliLiter(s) Oral Mucosa every 12 hours  chlorhexidine 2% Cloths 1 Application(s) Topical <User Schedule>  chlorhexidine 4% Liquid 1 Application(s) Topical <User Schedule>  cisatracurium Infusion 3 MICROgram(s)/kG/Min (32.6 mL/Hr) IV Continuous <Continuous>  dextrose 5%. 1000 milliLiter(s) (50 mL/Hr) IV Continuous <Continuous>  dextrose 50% Injectable 12.5 Gram(s) IV Push once  dextrose 50% Injectable 25 Gram(s) IV Push once  dextrose 50% Injectable 25 Gram(s) IV Push once  fentaNYL   Infusion. 0.5 MICROgram(s)/kG/Hr (9.05 mL/Hr) IV Continuous <Continuous>  insulin regular Infusion 3 Unit(s)/Hr (3 mL/Hr) IV Continuous <Continuous>  midazolam Infusion 0.02 mG/kG/Hr (3.62 mL/Hr) IV Continuous <Continuous>  norepinephrine Infusion 0.05 MICROgram(s)/kG/Min (8.48 mL/Hr) IV Continuous <Continuous>  pantoprazole  Injectable 40 milliGRAM(s) IV Push daily  polyethylene glycol 3350 17 Gram(s) Oral at bedtime  senna 2 Tablet(s) Oral at bedtime  sevelamer carbonate Powder 800 milliGRAM(s) Oral three times a day with meals  sodium bicarbonate  Infusion 0.066 mEq/kG/Hr (80 mL/Hr) IV Continuous <Continuous>    MEDICATIONS  (PRN):  acetaminophen   Tablet .. 650 milliGRAM(s) Oral every 4 hours PRN Temp greater or equal to 38.5C (101.3F)  ALBUTerol    90 MICROgram(s) HFA Inhaler 2 Puff(s) Inhalation every 4 hours PRN Shortness of Breath and/or Wheezing  dextrose 40% Gel 15 Gram(s) Oral once PRN Blood Glucose LESS THAN 70 milliGRAM(s)/deciliter  glucagon  Injectable 1 milliGRAM(s) IntraMuscular once PRN Glucose LESS THAN 70 milligrams/deciliter  sodium chloride 0.9% lock flush 10 milliLiter(s) IV Push every 1 hour PRN Pre/post blood products, medications, blood draw, and to maintain line patency      Allergies    penicillin (Fever)          Vital Signs Last 24 Hrs  T(C): 38.1 (04 Apr 2020 20:00), Max: 38.1 (04 Apr 2020 20:00)  T(F): 100.5 (04 Apr 2020 20:00), Max: 100.5 (04 Apr 2020 20:00)  HR: 93 (05 Apr 2020 09:33) (79 - 99)  BP: 123/53 (05 Apr 2020 05:09) (115/52 - 123/53)  BP(mean): 73 (05 Apr 2020 05:09) (70 - 73)  RR: 34 (05 Apr 2020 05:09) (34 - 34)  SpO2: 75% (05 Apr 2020 09:33) (75% - 86%)    PHYSICAL EXAM:  GENERAL: Vented; obese  HEAD: intubated   EXTREMITIES:  + edema  Further exam deferred to limit COVID exposure    LABS:                        12.0   53.54 )-----------( 282      ( 05 Apr 2020 05:02 )             37.8     04-05    138  |  94<L>  |  123.0<H>  ----------------------------<  209<H>  7.4<HH>   |  23.0  |  7.43<H>    Ca    8.0<L>      05 Apr 2020 05:02  Phos  12.1     04-05  Mg     3.1     04-05    TPro  6.4<L>  /  Alb  3.0<L>  /  TBili  0.7  /  DBili  x   /  AST  41<H>  /  ALT  25  /  AlkPhos  94  04-05        Magnesium, Serum: 3.1 mg/dL (04-05 @ 05:02)  Phosphorus Level, Serum: 12.1 mg/dL (04-05 @ 05:02)      RADIOLOGY & ADDITIONAL TESTS:  < from: Xray Chest 1 View-PORTABLE IMMEDIATE (04.04.20 @ 14:15) >     EXAM:  XR CHEST PORTABLE IMMED 1V                          PROCEDURE DATE:  04/04/2020          INTERPRETATION:    XR CHEST IMMEDIATE    History: Intubated, COVID positive    FINDINGS /   IMPRESSION:  1.  Dense RIGHT base opacity is progressed from the prior exam. Associated pleural effusion cannot be excluded.  2.  Previously noted diffuse LEFT lung consolidation is diminished from the prior exam.  3.  ETT noted in proper position approximately 4 cm above the yara.  4.  LEFT IJ dialysis catheter overlies the SVC. RIGHT IJ catheter noted with the distal tip partially obscured, likely overlying the SVC/RA.  5.  NG tube observed with its distal tip obscured by radiographic technique.    < end of copied text >

## 2020-04-05 NOTE — CHART NOTE - NSCHARTNOTEFT_GEN_A_CORE
Charting and recommendations based on EMR review at offsite location.     Ideal body weight:  75kg                           6-8mL/k-600     Plateau pressure: N/A  ABG - ( 2020 14:35 )  pH, Arterial: 7.20  pH, Blood: x     /  pCO2: 61    /  pO2: 46    / HCO3: 20    / Base Excess: -4.7  /  SaO2: 77        T(C): 38.5 (20 @ 08:00), Max: 38.5 (20 @ 08:00)  HR: 106 (20 20:00) (87 - 150)  BP: 100/50 (20 20:00) (100/50 - 125/58)  RR: 34 (20:) (34 - 34)  SpO2: 80% (20 20:00) (75% - 86%)    20 @ 07:01  -  20 @ 07:00  --------------------------------------------------------  IN: 2303.3 mL / OUT: 950 mL / NET: 1353.3 mL    20 @ 07:01  -  20 @ 21:43  --------------------------------------------------------  IN: 281 mL / OUT: 0 mL / NET: 281 mL      Sedation: Fentanyl/Versed  Paralytic: Y  Plaquenil: 3/31-  CRP:   A/C: Eliquis  Steroids: N  IL6/IL1:N    Recommendations:  Chart reviewed, no escalation of care noted. Consider discontinuation of paralytics.

## 2020-04-05 NOTE — CHART NOTE - NSCHARTNOTEFT_GEN_A_CORE
Call placed to pt's Sister Hanane, HCP at [phone] to discuss gravity of pt's condition.      Pt currently in multisystem organ failure with profound hypoxia >48hrs.  On max therapy at this time on the ventilator.  Worsening renal function with hyperkalemia; pt unable to tolerate CVVHD 2/2 to hypotension and hypoxia.    Hyperglycemia    Sister expresses she understands futility of care at this point.  Desires pt's wife to be able to say her goodbyes prior to stopping care.  Pt is currently admitted to [facility]  located on 4T.  Sister also desires for pt's 2 daughters to be able to say goodbyes in the morning once she is off from work and able to come to pt's bedside.      We will arrange for pt's wife to come see her .  It is reasonable to maintain pt as is with no escalation of care through the night in order for remaining  family to say their goodbyes, with the understand that if pt's condition should worsen tonight, no further treatment will be rendered and CPR WILL NOT be performed.

## 2020-04-05 NOTE — PROGRESS NOTE ADULT - ASSESSMENT
GODWIN: +COVID PNA/ARDS  Pt has not been responding to therapy and has clinically worsened  Cannot tolerate conventional HD  - pt now DNR and we will continue to treat but avoid aggressive interventions, including dialysis

## 2020-04-05 NOTE — PROGRESS NOTE ADULT - SUBJECTIVE AND OBJECTIVE BOX
60 yo M with Afib on AC, COPD, HTN, NOBLE, CKD, presented with COVID PNA, deteriorated, required intubation for hypoxemic resp distress.     24 hr events: Osat stays in low 80s; FS > 200 persistently.    Vitals, telemetry, labs, recent imaging reviewed.  Meds reviewed and adjusted.    Exam:  Neuro: sedated, paralyzed.   Pulm: supine/prone, synch with the vent.  Cardiac: regular rhythm.  Abd: soft, not distended.  Peripheral edema    Devices.  ETT: present.  Central line: RIJ  A-line: +  NGT  Perez

## 2020-04-05 NOTE — PROGRESS NOTE ADULT - ASSESSMENT
59 yr old male with hypertension, diabetes mellitus, hyperlipidemia, morbid obesity, NOBLE on CPAP, DVT s/p IVC filter on anticoagulation, CKD stage 3 admitted with complaints of one week of fatigue, body aches, progressively worsening shortness of breath with a cough. Recently lost father in law. States his wife has similar symptoms and is in the ED with similar symptoms. He is not on home oxygen. Severely hypoxic in ED. No nausea, vomiting, abdominal pain, diarrhea. Follows nephrology. (30 Mar 2020 19:47)    patient was intubated on 3/31/2020. Now under ICU care in [address] [place] [address].   COVID 19 testing positive.  started on Hydroxychloroquine      Impression:  COVID-19 Pneumonia  multifocal Pneumonia  acute hypoxic respiratory failuire  s/p intubation    Plan:   Continue supportive care measures  - continue mechanical ventilation  - continue steroids course as per ICU team    continue course of Hydroxychloroquine  - will need to monitor for  QT prolongation    Procalc levels stable, will watch  CRP down going      Renal failure  - renal following    - trend CBC with diff, CMP with LFT's  - trend Inflammatory markers (ESR, CRP, Ferritin, LDH,  procalcitonin)  q48h.  D dimer, lactate, troponin, CK, PT/PTT x 1      Will follow with you.

## 2020-04-06 NOTE — DISCHARGE NOTE FOR THE EXPIRED PATIENT - HOSPITAL COURSE
60 yo M with multiple comorbidities, presented with acute resp hypoxemic failure/ARDS due to COVID-19 PNA, GODWIN on CKD required HD, which he could not tolerate.  Persistently hypoxemic, unsafe to prone.   Upon discussion with the family - no escalation of care and DNR in view of extremely poor prognosis.   2020 at 08:12 am, asystoly.

## 2020-04-07 NOTE — CDI QUERY NOTE - NSCDIOTHERTXTBX_GEN_ALL_CORE_HH
Patient presented tachypneic, hypoxic and was hypotensive in ED. He was managed for septic shock during admission. Can you please clarify sepsis POA status?    A.	Sepsis present on admission due pneumonia CoVID -19 positive  B.	Other, please specify  C.	Not clinically significant    Supporting Documentations:    4/30/20 @ 1417 ED Adult Flow Sheet:  Respiration Rate (breaths/min): 22 /min  SpO2 (%): 73 %  Patient On (Patient Delivery Method): room air    4/30/20 ED Adult Flow Sheet:  BP Systolic: 84 mm Hg  BP Diastolic (mm Hg): 54 mm Hg  Respiration Rate (breaths/min): 26 /min  SpO2 (%): 94 %  Patient On (Patient Delivery Method): mask, nonrebreather      Laboratory:     WBC  3/30/20: 10.67H  3/31/20: 11.28H  4/1/20: 14.47H  4/2/20: 12.52H  4/3/20: 14.06H  4/4/20: 18.64H  4/5/20: 53.54 Very abnormal high -? 43.71 Very Abnormally high      Blood Gas Venous- Lactate:  3/30/20:  2.3H      3/30/20 H&P Adult;  Problem/Plan - 1:  •? Problem: R/O Pneumonia due to 2019-nCoV.  Plan: Maintain on NRB for now, start Plaquenil pending results of COVID 19. Discussed possible need for intubation if he deteriorates, understands and would want to proceed if need be      3/31/20 and 4/1/20 Progress Note MICU:  CARDIOVASCULAR  Shock: [x] septic   [x] norepinephrine __ mcg/kg/min  MAP>65    4/1/20 and 4/2/20 Consult Note Nephrology:  • Assessment	  GODWIN on CKD   Covid 19 (+)   Resp failure   SIRS    4/2/20 Progress Note MICU:  CARDIOVASCULAR  Shock: [x] septic   off norepinephrine gtt  MAP>65

## 2020-04-09 LAB
A-TUMOR NECROSIS FACT SERPL-MCNC: <5 PG/ML — SIGNIFICANT CHANGE UP
IL10 SERPL-MCNC: 15 PG/ML — SIGNIFICANT CHANGE UP
IL12 SERPL-MCNC: <5 PG/ML — SIGNIFICANT CHANGE UP
IL13 SERPL-MCNC: <5 PG/ML — SIGNIFICANT CHANGE UP
IL2 SERPL-MCNC: 1878 PG/ML — HIGH
IL2 SERPL-MCNC: <5 PG/ML — SIGNIFICANT CHANGE UP
IL4 SERPL-MCNC: <5 PG/ML — SIGNIFICANT CHANGE UP
IL6 SERPL-MCNC: 124 PG/ML — HIGH
IL8 SERPL-MCNC: <5 PG/ML — SIGNIFICANT CHANGE UP
INTERFERON GAMMA: <5 PG/ML — SIGNIFICANT CHANGE UP
INTERLEUKIN 1 BETA: <5 PG/ML — SIGNIFICANT CHANGE UP
INTERLEUKIN 17: <5 PG/ML — SIGNIFICANT CHANGE UP
INTERLEUKIN 5: <5 PG/ML — SIGNIFICANT CHANGE UP

## 2020-09-06 LAB — GAS PNL BLDA: SIGNIFICANT CHANGE UP

## 2021-08-23 NOTE — ED STATDOCS - CROS ED NEURO NEG
c/o right flank pain for past 2 day intermittently, worse today, c/o abdominal incisional pain s/p gastric sleeve on 08/13/2021 Hx; asthma, depression, went to urgent care today and sent to ER to r/o kidney infection, patient states she has been unable to stay hydrated.
no headache

## 2022-01-06 NOTE — ED ADULT NURSE NOTE - CARDIO ASSESSMENT
--- Acitretin Pregnancy And Lactation Text: This medication is Pregnancy Category X and should not be given to women who are pregnant or may become pregnant in the future. This medication is excreted in breast milk.

## 2022-02-19 NOTE — PROGRESS NOTE ADULT - PROBLEM SELECTOR PROBLEM 4
Joanna is calling this morning to check status of Rx refill of morphine.  Will be out today and needs to have refilled this weekend.  Please call and advise.  Message sent to Dr Mccullough as ELIDA as well as call center this morning.   HLD (hyperlipidemia)

## 2022-05-27 NOTE — PROVIDER CONTACT NOTE (CRITICAL VALUE NOTIFICATION) - TEST AND RESULT REPORTED:
REFILL REQUEST UPDATE FROM PHYSICIAN    Requested Prescriptions     Signed Prescriptions Disp Refills    sertraline (ZOLOFT) 100 MG tablet 60 tablet 2     Sig: Take 2 tablets by mouth daily     Authorizing Provider: Wilian Senior       The following medications were refilled per patient request to the pharmacy identified as patient's preference pharmacy. Prior to refill, the [place] has been reviewed as needed for controlled substance monitoring and no concerns were identified. Orders Placed This Encounter   Medications    sertraline (ZOLOFT) 100 MG tablet     Sig: Take 2 tablets by mouth daily     Dispense:  60 tablet     Refill:  2       Please inform the patient of any pending lab work, so they may complete this prior to the next medication fill.      Payton Alfonso MD
Rx requested:  Requested Prescriptions     Pending Prescriptions Disp Refills    sertraline (ZOLOFT) 100 MG tablet [Pharmacy Med Name: SERTRALINE  MG TABLET] 53 tablet 0     Sig: TAKE 1 TABLET BY MOUTH DAILY FOR 7 DAYS, THEN 2 TABLETS DAILY FOR 23 DAYS.        Last Office Visit:   5/3/2022      Next Visit Date:  Future Appointments   Date Time Provider Keri Baird   6/13/2022  3:00 PM Matt Krishnamurthy MD [facility]
Potassium 6.2
Potassium level 6.6
Troponin 0.08
potassium 7.4

## 2022-07-07 NOTE — PROGRESS NOTE ADULT - PROVIDER SPECIALTY LIST ADULT
C/o coughing up blood yesterday and today and blood from nose. Sharp mid sternal cp radiates to back with shortness of breath pain 9/10.    Pt stable dispo pending Infectious Disease

## 2022-11-01 NOTE — DISCHARGE NOTE ADULT - HAS THE PATIENT RECEIVED THE INFLUENZA VACCINE THIS SEASON?
yes... Albendazole Counseling:  I discussed with the patient the risks of albendazole including but not limited to cytopenia, kidney damage, nausea/vomiting and severe allergy.  The patient understands that this medication is being used in an off-label manner.

## 2023-02-06 NOTE — ED PROVIDER NOTE - DISCUSSED CLINICAL AND RADIOLOGICAL FINDINGS WITH, MDM
patient Terbinafine Counseling: Patient counseling regarding adverse effects of terbinafine including but not limited to headache, diarrhea, rash, upset stomach, liver function test abnormalities, itching, taste/smell disturbance, nausea, abdominal pain, and flatulence.  There is a rare possibility of liver failure that can occur when taking terbinafine.  The patient understands that a baseline LFT and kidney function test may be required. The patient verbalized understanding of the proper use and possible adverse effects of terbinafine.  All of the patient's questions and concerns were addressed.

## 2024-12-02 NOTE — ED PROCEDURE NOTE - CPROC ED LACERATION CLEANSED1
Had another dizzy spell and went to [facility] ED. They did a CT which was normal and was told to continue taking the meclizine. Only has enough to last through tomorrow. Asking for a refill to be sent to [facility].   
Rx proposed   
cleansed

## 2025-04-29 NOTE — PATIENT PROFILE ADULT - OVER THE PAST TWO WEEKS HAVE YOU FELT DOWN, DEPRESSED OR HOPELESS?
H&P - Trauma   Name: Efrain Valdes 
Attending
no